# Patient Record
Sex: MALE | Race: WHITE | URBAN - METROPOLITAN AREA
[De-identification: names, ages, dates, MRNs, and addresses within clinical notes are randomized per-mention and may not be internally consistent; named-entity substitution may affect disease eponyms.]

---

## 2021-05-29 ENCOUNTER — RECORDS - HEALTHEAST (OUTPATIENT)
Dept: ADMINISTRATIVE | Facility: CLINIC | Age: 51
End: 2021-05-29

## 2022-12-08 ENCOUNTER — MEDICAL CORRESPONDENCE (OUTPATIENT)
Dept: HEALTH INFORMATION MANAGEMENT | Facility: CLINIC | Age: 52
End: 2022-12-08

## 2022-12-09 ENCOUNTER — TRANSCRIBE ORDERS (OUTPATIENT)
Dept: OTHER | Age: 52
End: 2022-12-09

## 2022-12-09 DIAGNOSIS — R05.9 COUGH, UNSPECIFIED TYPE: Primary | ICD-10-CM

## 2023-01-18 NOTE — TELEPHONE ENCOUNTER
FUTURE VISIT INFORMATION      FUTURE VISIT INFORMATION:    Date: 3/23/23    Time: 1pm    [place] [facility] [place] [facility] [place]FORMATION:    Referring provider:      Referring providers clinic:      Reason for visit/diagnosis  Cough, unspecified type, WC Claim #[id number] - need insurnace name/info, apt per Pt, recs in epic, location verified, ref attached    RECORDS REQUESTED FROM:       Clinic name Comments Records Status Imaging Status   [facility] 12/2/22- CT Chest     Office Visit  11/14/22, 3/14/22- note with Francis Neal MD CE 3/6/23-  Pending req  PACS    [facility]  3/4/22- CT Chest   2/3/22- XR Chest     Office Visit  4/12/22- Note with Agustín Goodman MBBS  3/2/22- note with Keyona Barlow MD  2/25/22- note with Ariela Beckwith PA  7/30/15- note with Aris Mcdaniels PA CE  3/6/23-  Pending req  PACS   [facility] note with Siomara Solorzano MD CE                        March 6, 2023 11:52 AM - Faxed a request to [facility] and [facility] to push Image to [facility] PACS- Alexa  March 13, 2023 at 7:45 AM - [facility] images in PACS, resolved and attached. -Peg   March 17, 2023 at 8:02 AM - Called [facility] for images pushed and send 2nd request -Peg  March 17, 2023 at 3:10 PM - Images from [facility] resolved and attached to patient in PACS -Peg

## 2023-03-22 NOTE — PROGRESS NOTES
[facility]   at the [facility]   Otolaryngology Clinic     Patient: Aris Colon    MRN: [id number]    : 1970    Age/Gender: 52 year old male  Date of Service: 3/23/2023  Rendering Provider:   Kathy De Paz MD     Referring Provider   PCP: No primary care provider on file.  Referring Physician: Provider Not In System     Reason for Consultation   Cough  History   HISTORY OF PRESENT ILLNESS: Mr. Colon is a 52 year old male who presents to us today with cough.      he presents today for evaluation. 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    PAST SURGICAL HISTORY: No past surgical history on file.    CURRENT MEDICATIONS:   Current Outpatient Medications:      albuterol (PROAIR HFA/PROVENTIL HFA/VENTOLIN HFA) 108 (90 Base) MCG/ACT inhaler, 2 puffs every 4 hours as needed, Disp: , Rfl:      FLUoxetine (PROZAC) 20 MG capsule, , Disp: , Rfl:      fluticasone-vilanterol (BREO ELLIPTA) 100-25 MCG/ACT inhaler, , Disp: , Rfl:      hydrOXYzine (ATARAX) 10 MG tablet, , Disp: , Rfl:      sildenafil (REVATIO) 20 MG tablet, TAKE 1-5 TABLETS BY MOUTH 30 MINS TO 4 HOURS BEFORE SEXUAL ACTIVITY, Disp: ,  Rfl:      tiotropium (SPIRIVA RESPIMAT) 2.5 MCG/ACT inhaler, Inhale 2 puffs into the lungs, Disp: , Rfl:     ALLERGIES: Patient has no known allergies.    SOCIAL HISTORY:    Social History     Socioeconomic History     Marital status:      Spouse name: Not on file     Number of children: Not on file     Years of education: Not on file     Highest education level: Not on file   Occupational History     Not on file   Tobacco Use     Smoking status: Never     Smokeless tobacco: Never   Substance and Sexual Activity     Alcohol use: Not on file     Drug use: Not on file     Sexual activity: Not on file   Other Topics Concern     Not on file   Social History Narrative     Not on file     Social Determinants of Health     Financial Resource Strain: Not on file   Food Insecurity: Not on file   Transportation Needs: Not on file   Physical Activity: Not on file   Stress: Not on file   Social Connections: Not on file   Intimate Partner Violence: Not on file   Housing Stability: Not on file         FAMILY HISTORY: No family history on file.  Non-contributory for problems with anesthesia    REVIEW OF SYSTEMS:   The patient was asked a 14 point review of systems regarding constitutional symptoms, eye symptoms, ears, nose, mouth, throat symptoms, cardiovascular symptoms, respiratory symptoms, gastrointestinal symptoms, genitourinary symptoms, musculoskeletal symptoms, integumentary symptoms, neurological symptoms, psychiatric symptoms, endocrine symptoms, hematologic/lymphatic symptoms, and allergic/ immunologic symptoms.   The pertinent factors have been included in the HPI and below.  Patient Supplied Answers to Review of Systems  No flowsheet data found.    Physical Examination   The patient underwent a physical examination as described below. The pertinent positive and negative findings are summarized after the description of the examination.  Constitutional: The patient's developmental and nutritional status was  assessed. The patient's voice quality was assessed.  Head and Face: The head and face were inspected for deformities. The sinuses were palpated. The salivary glands were palpated. Facial muscle strength was assessed bilaterally.  Eyes: Extraocular movements and primary gaze alignment were assessed.  Ears, Nose, Mouth and Throat: The ears and nose were examined for deformities. The nasal septum, mucosa, and turbinates were inspected by anterior rhinoscopy. The lips, teeth, and gums were examined for abnormalities. The oral mucosa, tongue, palate, tonsils, lateral and posterior pharynx were inspected for the presence of asymmetry or mucosal lesions.    Neck: The tracheal position was noted, and the neck mass palpated to determine if there were any asymmetries, abnormal neck masses, thyromegally, or thyroid nodules.  Respiratory: The nature of the breathing and chest expansion/symmetry was observed.  Cardiovascular: The patient was examined to determine the presence of any edema or jugular venous distension.  Abdomen: The contour of the abdomen was noted.  Lymphatic: The patient was examined for infraclavicular lymphadenopathy.  Musculoskeletal: The patient was inspected for the presence of skeletal deformities.  Extremities: The extremities were examined for any clubbing or cyanosis.  Skin: The skin was examined for inflammatory or neoplastic conditions.  Neurologic: The patient's orientation, mood, and affect were noted. The cranial nerve  functions were examined.  Other pertinent positive and negative findings on physical examination:      OC/OP: no lesions, uvula midline, soft palate elevates symmetrically, bilateral mandibular suzanna  Neck: no lesions, no TH tenderness to palpation    All other physical examination findings were within normal limits and noncontributory.  Procedures   Flexible laryngoscopy (CPT 85095)      Pre-procedure diagnosis: chronic cough  Post-procedure diagnosis: same as above  Indication for  procedure: Mr. Colon is a 52 year old male with see above  Procedure(s): Fiberoptic Laryngoscopy    Details of Procedure: After informed consent was obtained, the patient was seated in the examination chair.  The areas of the nasopharynx as well as the hypopharynx were anesthetized with topical 4% lidocaine with 0.25% phenylephrine atomizer.  Examination of the base of tongue was performed first.  Attention was directed to any evidence of masses in the area or evidence of leukoplakia or candidal infection.  Attention was directed to the epiglottis where its size and position was determined and its movement on phonation of the vowel  e .  The piriform sinuses were then inspected for any mass lesions or pooling of secretions.  Attention was then directed to the larynx. The vocal folds were inspected for infection or any areas of leukoplakia, for masses, polypoid degeneration, or hemorrhage.  Having done this, the arytenoids and vocal processes were inspected for erythema or evidence of granuloma formation.  The posterior commissure was then inspected for evidence of inflammatory changes in the mucosa and heaping up of mucosal tissue. The patient was then instructed to say the vowel  e .  Adduction of vocal folds to the midline was observed for any evidence of paresis or paralysis of the larynx or asymmetry in rotation of the larynx to the left or right. The patient was asked to breathe and the degree of abduction was noted bilaterally.  Subglottic view of the larynx was obtained for any additional mass lesions or mucosal changes.  Finally the post cricoid was examined for evidence of pooling of secretions, as well as the pharyngeal wall mucosa.   Anesthesia type: 0.25% phenylephrine    Findings:  Anatomic/physiological deviations: LNC, postcricoid edema   Right vocal process: No restriction of mobility   Left vocal process: No restriction of mobility  Glottal gap: Complete glottal closure  Supraglottic  structures: Normal  Hypopharynx: Normal     Estimated Blood Loss: minimal  Complications: None  Disposition: Patient tolerated the procedure well        Review of Relevant Clinical Data   I personally reviewed:  Notes: Val 11/14/2022  Exposure to dusts in the workplace, with probable RADS due to workplace exposure to dusts and refractory cough:  Based on what data is available, it does not appear likely he was not exposed to any agent that posed an immediate risk to health  He continues to have a severe cough, along with dyspnea. He's been away from all dust exposure for about 5 months now, and the cough is still severe. This suggests his lungs still have a significant degree of irritability, and make continuing the work restriction the best path forward (though it could be a more narrow restriction, as outlined elsewhere). Also, since the symptoms persist, will recheck CT scan now, and perform airway exam (bronchoscopy). Likely to be low-yield but are reasonable to do now.   I did instruct him to try, as much as possible, to suppress the cough. It's likely that his larynx is hypersensitized now, and that each coughing spell further promotes such hypersensitivity and thus more cough. After bronchoscopy, the next steps likely to help him include referral to [facility]. There, it's likely he can learn techniques to suppress the cough.  We still recommend he use the ICS/LABA, and if cost is a limiting factor, I told him to let us know and we can see what alternatives exist (ideally with input from the pharmacy he went to). We restarted benzonatate recently, and will continue it for now.   He saw Dr. Goodman earlier this year, and he had suggested tiotropium -- will resume that now (samples only given -- if he finds helpful, will ERx med). He'd also suggested referral for workup of possible irritable larynx syndrome. That would be the next step if nothing is found on CT/bronch.   For now, best plan is to allow  more time away from the exposures at work and elsewhere and hope that he continues to improve. See work restriction below. We will follow up in April 2023, and update the work restriction again then.   Routine environmental allergy testing was negative earlier. No sign of eosinophilia.   I believe he is still too ill for pulmonary function testing or methacholine challenge - if done now, it would likely be marked by severe cough artifact and thus uninterpretable.   Imaging with chest CT/HRCT did not suggest a pulmonary parenchymal disease process in March 2022. Repeat now, as above.   Other potentially similar entities are not likely: Underlying asthma exacerbated by exposure to dusts, vocal cord dysfunction per se, and eosinophilic bronchitis. Irritable larynx syndrome is possible. Referral to [facility] may help. Will also later consider a trial of pregabalin or gabapentin.  His case meets the definition of RADS (which is similar to irritant induced asthma: this is a form of occupational asthma), and he is already on appropriate treatment, unable to do PFTs, as above.   Once symptoms come under better control, we hope to check PFTs and methacholine challenge, to confirm obstruction and thus RADS as the diagnosis.   I encouraged him to continue to exercise, and begin slowly starting walking again. He's been limited due to the severity of the cough.  Cough - as above, most likely due to RADS. Treatment and removal from exposures as above.   Remote history of minimal tobacco use but no ongoing exposure or vaping. No sign of underlying preexisting respiratory disease. he has had a long history of a high level of exercise tolerance (2 hours routinely in the past).     Radiology: CT chest 12/2/22  1. No acute infiltrate, pleural fluid or adenopathy.   2. A few subpleural nodules in the right middle lobe of the lung measuring up to 3 mm are unchanged as is a 3 mm left lower lobe nodule. These are probably benign.    3. Ascending thoracic aortic aneurysm measuring 46 mm unchanged.     Patent subglottis  Pathology:  Procedures:   Labs:  No results found for: TSH  No results found for: NA, CO2, BUN, CREAT, GLUCOSE, PHOS  No results found for: WBC, HGB, HCT, MCV, PLT  No results found for: PT, PTT, INR  No results found for: MAREN  No components found for: RHEUMATOIDFACTOR,  RF  No results found for: CRP  No components found for: CKTOT, URICACID  No components found for: C3, C4, DSDNAAB, NDNAABIFA  No results found for: MPOAB    Patient reported Quality of Life (QOL) Measures   Patient Supplied Answers To VHI Questionnaire  No flowsheet data found.      Patient Supplied Answers To EAT Questionnaire  No flowsheet data found.      Patient Supplied Answers To CSI Questionnaire  No flowsheet data found.      Patient Supplied Answers to Dyspnea Index Questionnaire:  No flowsheet data found.    Impression & Plan     IMPRESSION: Mr. Colon is a 52 year old male who is being seen for the followin. 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 A few subpleural nodules in the right middle lobe of the lung measuring up to 3 mm are unchanged as is a 3 mm left lower lobe nodule. These are probably benign.  Ascending thoracic aortic aneurysm measuring 46 mm unchanged. Patient subglottis  - no ACEI   - allergy triggers and work up: none  - pulmonary triggers and work up: working with pulm, had chest CT and bronchoscopy, has inhalers and tessalon perles  - GI triggers and work up: wakes up coughing at night, denies feeling reflux, vomits from coughing  - ENT triggers and work up: associated with fragrances, heat, humidity, and  moisture  - scope shows postcricoid edema  - symptoms likely due to irritable larynx syndrome and laryngopharyngeal reflux, does have coughing at night and snoring so need to rule out sleep apnea  Plan  - cough/throat clearing suppression therapy  - start omeprazole 40 mg BID  - start tessalon perles TID prn  - Xray Video Swallow Exam with esophagram  - sleep study  - referral to health psychology      RETURN VISIT: end of May    Thank you for the kind referral and for allowing me to share in the care of Mr. Colon. If you have any questions, please do not hesitate to contact me.    Scribe Disclosure:  I, Brenden Alas, am serving as a scribe to document services personally performed by Kathy De Paz MD based on data collection and the provider's statements to me.     Kathy De Paz MD    Laryngology    [facility]  Department of  Otolaryngology - Head and Neck Surgery  Clinics & Surgery Center  [address] [place] [id number]  Appointment line: [phone]  Fax: [phone]  [email or web address]

## 2023-03-23 ENCOUNTER — PRE VISIT (OUTPATIENT)
Dept: OTOLARYNGOLOGY | Facility: CLINIC | Age: 53
End: 2023-03-23

## 2023-03-23 ENCOUNTER — OFFICE VISIT (OUTPATIENT)
Dept: OTOLARYNGOLOGY | Facility: CLINIC | Age: 53
End: 2023-03-23
Payer: OTHER MISCELLANEOUS

## 2023-03-23 ENCOUNTER — THERAPY VISIT (OUTPATIENT)
Dept: SPEECH THERAPY | Facility: CLINIC | Age: 53
End: 2023-03-23
Payer: MEDICAID

## 2023-03-23 VITALS
WEIGHT: 198 LBS | HEART RATE: 77 BPM | DIASTOLIC BLOOD PRESSURE: 85 MMHG | BODY MASS INDEX: 28.35 KG/M2 | OXYGEN SATURATION: 98 % | SYSTOLIC BLOOD PRESSURE: 132 MMHG | HEIGHT: 70 IN

## 2023-03-23 DIAGNOSIS — R49.0 DYSPHONIA: ICD-10-CM

## 2023-03-23 DIAGNOSIS — J38.7 IRRITABLE LARYNX SYNDROME: ICD-10-CM

## 2023-03-23 DIAGNOSIS — J38.7 LARYNGEAL HYPERFUNCTION: ICD-10-CM

## 2023-03-23 DIAGNOSIS — R05.9 COUGH, UNSPECIFIED TYPE: ICD-10-CM

## 2023-03-23 DIAGNOSIS — R05.3 CHRONIC COUGH: Primary | ICD-10-CM

## 2023-03-23 DIAGNOSIS — R05.9 COUGH, UNSPECIFIED TYPE: Primary | ICD-10-CM

## 2023-03-23 PROCEDURE — 31575 DIAGNOSTIC LARYNGOSCOPY: CPT | Performed by: OTOLARYNGOLOGY

## 2023-03-23 PROCEDURE — 92524 BEHAVRAL QUALIT ANALYS VOICE: CPT | Mod: GN | Performed by: SPEECH-LANGUAGE PATHOLOGIST

## 2023-03-23 PROCEDURE — 99205 OFFICE O/P NEW HI 60 MIN: CPT | Mod: 25 | Performed by: OTOLARYNGOLOGY

## 2023-03-23 PROCEDURE — 99207 PR NO BILLABLE SERVICE THIS VISIT: CPT | Performed by: SPEECH-LANGUAGE PATHOLOGIST

## 2023-03-23 RX ORDER — OMEPRAZOLE 40 MG/1
40 CAPSULE, DELAYED RELEASE ORAL 2 TIMES DAILY
Qty: 60 CAPSULE | Refills: 0 | Status: SHIPPED | OUTPATIENT
Start: 2023-03-23 | End: 2023-04-22

## 2023-03-23 RX ORDER — SILDENAFIL CITRATE 20 MG/1
TABLET ORAL
COMMUNITY
Start: 2023-03-15

## 2023-03-23 RX ORDER — HYDROXYZINE HYDROCHLORIDE 10 MG/1
TABLET, FILM COATED ORAL
COMMUNITY

## 2023-03-23 RX ORDER — ALBUTEROL SULFATE 90 UG/1
2 AEROSOL, METERED RESPIRATORY (INHALATION) EVERY 4 HOURS PRN
COMMUNITY
Start: 2022-05-24

## 2023-03-23 RX ORDER — FLUTICASONE FUROATE AND VILANTEROL 100; 25 UG/1; UG/1
POWDER RESPIRATORY (INHALATION)
COMMUNITY
Start: 2022-08-02

## 2023-03-23 RX ORDER — BENZONATATE 100 MG/1
100 CAPSULE ORAL 3 TIMES DAILY PRN
Qty: 30 CAPSULE | Refills: 4 | Status: SHIPPED | OUTPATIENT
Start: 2023-03-23

## 2023-03-23 ASSESSMENT — PAIN SCALES - GENERAL: PAINLEVEL: NO PAIN (0)

## 2023-03-23 NOTE — LETTER
Date:March 24, 2023      Patient was self referred, no letter generated. Do not send.        [facility] Health Information

## 2023-03-23 NOTE — PROGRESS NOTES
Aris Colon was seen for allied health care provider visit in conjunction with Dr. De Paz's ENT Multi-Disciplinary clinic. Introduced self and SLP roll in evaluation process. 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 He had increased breathing troubles and coughing due to exposure to the sugar dust. He has been working with various specialists to mitigate the cough and was ultimately referred to Dr. De Paz for evaluation. Currently pt reports some coughing with swallowing although he can cough at any time. Speaking is clear. Swallowing is functional per his report. He has stopped eating pizza, salami, and spicy foods because they will trigger a cough response. All questions answered within scope of practice for pt. Encouraged pt to reach out with any questions or concerns. Time spent with patient 10 minutes. Pt unable to tolerate scope exam without topical anesthetic so swallow evaluation deferred. Video swallow study recommended at a later date. No billable services provided during today's encounter.       Jerrica Brown MS, CCC-SLP  Speech-Language Pathology  [facility]  Department of Otolaryngology/D&T - 4th floor  Phone: [phone]  Email: [email or web address]
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
## 2023-03-23 NOTE — PATIENT INSTRUCTIONS
1.  You were seen in the ENT Clinic today by Dr. De Paz. If you have any questions or concerns after your appointment, please call [phone]. Press option #1 for scheduling related needs. Press option #3 for Nurse advice.    2.  Dr. De Paz has recommended the following:   - sleep study   - omperazole 40mg twice a day   -swallow study   - voice therapy   - teslon pearls as needed  - if reflux persists for over a month with medications reach out to our team     3.  Plan is to return to clinic mid to end of may      Fatemeh He  [phone]  [facility] - Otolaryngology

## 2023-03-23 NOTE — LETTER
3/23/2023       RE: Aris Colon  [address]  [address] [place] [id number]     Dear Colleague,    Thank you for referring your patient, Aris Colon, to the [facility] EAR [facility] AND THROAT CLINIC [place] at [facility]. Please see a copy of my visit note below.        [facility]   at the [facility]   Otolaryngology Clinic     Patient: Aris Colon    MRN: [id number]    : 1970    Age/Gender: 52 year old male  Date of Service: 3/23/2023  Rendering Provider:   Kathy De Paz MD     Referring Provider   PCP: No primary care provider on file.  Referring Physician: Provider Not In System     Reason for Consultation   Cough  History   HISTORY OF PRESENT ILLNESS: Mr. Colon is a 52 year old male who presents to us today with cough.      he presents today for evaluation. 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    PAST SURGICAL HISTORY: No past surgical history on file.    CURRENT MEDICATIONS:   Current Outpatient Medications:      albuterol (PROAIR HFA/PROVENTIL HFA/VENTOLIN HFA) 108 (90 Base) MCG/ACT inhaler, 2  puffs every 4 hours as needed, Disp: , Rfl:      FLUoxetine (PROZAC) 20 MG capsule, , Disp: , Rfl:      fluticasone-vilanterol (BREO ELLIPTA) 100-25 MCG/ACT inhaler, , Disp: , Rfl:      hydrOXYzine (ATARAX) 10 MG tablet, , Disp: , Rfl:      sildenafil (REVATIO) 20 MG tablet, TAKE 1-5 TABLETS BY MOUTH 30 MINS TO 4 HOURS BEFORE SEXUAL ACTIVITY, Disp: , Rfl:      tiotropium (SPIRIVA RESPIMAT) 2.5 MCG/ACT inhaler, Inhale 2 puffs into the lungs, Disp: , Rfl:     ALLERGIES: Patient has no known allergies.    SOCIAL HISTORY:    Social History     Socioeconomic History     Marital status:      Spouse name: Not on file     Number of children: Not on file     Years of education: Not on file     Highest education level: Not on file   Occupational History     Not on file   Tobacco Use     Smoking status: Never     Smokeless tobacco: Never   Substance and Sexual Activity     Alcohol use: Not on file     Drug use: Not on file     Sexual activity: Not on file   Other Topics Concern     Not on file   Social History Narrative     Not on file     Social Determinants of Health     Financial Resource Strain: Not on file   Food Insecurity: Not on file   Transportation Needs: Not on file   Physical Activity: Not on file   Stress: Not on file   Social Connections: Not on file   Intimate Partner Violence: Not on file   Housing Stability: Not on file         FAMILY HISTORY: No family history on file.  Non-contributory for problems with anesthesia    REVIEW OF SYSTEMS:   The patient was asked a 14 point review of systems regarding constitutional symptoms, eye symptoms, ears, nose, mouth, throat symptoms, cardiovascular symptoms, respiratory symptoms, gastrointestinal symptoms, genitourinary symptoms, musculoskeletal symptoms, integumentary symptoms, neurological symptoms, psychiatric symptoms, endocrine symptoms, hematologic/lymphatic symptoms, and allergic/ immunologic symptoms.   The pertinent factors have been included in the HPI  and below.  Patient Supplied Answers to Review of Systems  No flowsheet data found.    Physical Examination   The patient underwent a physical examination as described below. The pertinent positive and negative findings are summarized after the description of the examination.  Constitutional: The patient's developmental and nutritional status was assessed. The patient's voice quality was assessed.  Head and Face: The head and face were inspected for deformities. The sinuses were palpated. The salivary glands were palpated. Facial muscle strength was assessed bilaterally.  Eyes: Extraocular movements and primary gaze alignment were assessed.  Ears, Nose, Mouth and Throat: The ears and nose were examined for deformities. The nasal septum, mucosa, and turbinates were inspected by anterior rhinoscopy. The lips, teeth, and gums were examined for abnormalities. The oral mucosa, tongue, palate, tonsils, lateral and posterior pharynx were inspected for the presence of asymmetry or mucosal lesions.    Neck: The tracheal position was noted, and the neck mass palpated to determine if there were any asymmetries, abnormal neck masses, thyromegally, or thyroid nodules.  Respiratory: The nature of the breathing and chest expansion/symmetry was observed.  Cardiovascular: The patient was examined to determine the presence of any edema or jugular venous distension.  Abdomen: The contour of the abdomen was noted.  Lymphatic: The patient was examined for infraclavicular lymphadenopathy.  Musculoskeletal: The patient was inspected for the presence of skeletal deformities.  Extremities: The extremities were examined for any clubbing or cyanosis.  Skin: The skin was examined for inflammatory or neoplastic conditions.  Neurologic: The patient's orientation, mood, and affect were noted. The cranial nerve  functions were examined.  Other pertinent positive and negative findings on physical examination:      OC/OP: no lesions, uvula midline,  soft palate elevates symmetrically, bilateral mandibular suzanna  Neck: no lesions, no TH tenderness to palpation    All other physical examination findings were within normal limits and noncontributory.  Procedures   Flexible laryngoscopy (CPT 81902)      Pre-procedure diagnosis: chronic cough  Post-procedure diagnosis: same as above  Indication for procedure: Mr. Colon is a 52 year old male with see above  Procedure(s): Fiberoptic Laryngoscopy    Details of Procedure: After informed consent was obtained, the patient was seated in the examination chair.  The areas of the nasopharynx as well as the hypopharynx were anesthetized with topical 4% lidocaine with 0.25% phenylephrine atomizer.  Examination of the base of tongue was performed first.  Attention was directed to any evidence of masses in the area or evidence of leukoplakia or candidal infection.  Attention was directed to the epiglottis where its size and position was determined and its movement on phonation of the vowel  e .  The piriform sinuses were then inspected for any mass lesions or pooling of secretions.  Attention was then directed to the larynx. The vocal folds were inspected for infection or any areas of leukoplakia, for masses, polypoid degeneration, or hemorrhage.  Having done this, the arytenoids and vocal processes were inspected for erythema or evidence of granuloma formation.  The posterior commissure was then inspected for evidence of inflammatory changes in the mucosa and heaping up of mucosal tissue. The patient was then instructed to say the vowel  e .  Adduction of vocal folds to the midline was observed for any evidence of paresis or paralysis of the larynx or asymmetry in rotation of the larynx to the left or right. The patient was asked to breathe and the degree of abduction was noted bilaterally.  Subglottic view of the larynx was obtained for any additional mass lesions or mucosal changes.  Finally the post cricoid was examined  for evidence of pooling of secretions, as well as the pharyngeal wall mucosa.   Anesthesia type: 0.25% phenylephrine    Findings:  Anatomic/physiological deviations: LNC, postcricoid edema   Right vocal process: No restriction of mobility   Left vocal process: No restriction of mobility  Glottal gap: Complete glottal closure  Supraglottic structures: Normal  Hypopharynx: Normal     Estimated Blood Loss: minimal  Complications: None  Disposition: Patient tolerated the procedure well        Review of Relevant Clinical Data   I personally reviewed:  Notes: Val 11/14/2022  Exposure to dusts in the workplace, with probable RADS due to workplace exposure to dusts and refractory cough:  Based on what data is available, it does not appear likely he was not exposed to any agent that posed an immediate risk to health  He continues to have a severe cough, along with dyspnea. He's been away from all dust exposure for about 5 months now, and the cough is still severe. This suggests his lungs still have a significant degree of irritability, and make continuing the work restriction the best path forward (though it could be a more narrow restriction, as outlined elsewhere). Also, since the symptoms persist, will recheck CT scan now, and perform airway exam (bronchoscopy). Likely to be low-yield but are reasonable to do now.   I did instruct him to try, as much as possible, to suppress the cough. It's likely that his larynx is hypersensitized now, and that each coughing spell further promotes such hypersensitivity and thus more cough. After bronchoscopy, the next steps likely to help him include referral to [facility]. There, it's likely he can learn techniques to suppress the cough.  We still recommend he use the ICS/LABA, and if cost is a limiting factor, I told him to let us know and we can see what alternatives exist (ideally with input from the pharmacy he went to). We restarted benzonatate recently, and will continue  it for now.   He saw Dr. Goodman earlier this year, and he had suggested tiotropium -- will resume that now (samples only given -- if he finds helpful, will ERx med). He'd also suggested referral for workup of possible irritable larynx syndrome. That would be the next step if nothing is found on CT/bronch.   For now, best plan is to allow more time away from the exposures at work and elsewhere and hope that he continues to improve. See work restriction below. We will follow up in April 2023, and update the work restriction again then.   Routine environmental allergy testing was negative earlier. No sign of eosinophilia.   I believe he is still too ill for pulmonary function testing or methacholine challenge - if done now, it would likely be marked by severe cough artifact and thus uninterpretable.   Imaging with chest CT/HRCT did not suggest a pulmonary parenchymal disease process in March 2022. Repeat now, as above.   Other potentially similar entities are not likely: Underlying asthma exacerbated by exposure to dusts, vocal cord dysfunction per se, and eosinophilic bronchitis. Irritable larynx syndrome is possible. Referral to [facility] may help. Will also later consider a trial of pregabalin or gabapentin.  His case meets the definition of RADS (which is similar to irritant induced asthma: this is a form of occupational asthma), and he is already on appropriate treatment, unable to do PFTs, as above.   Once symptoms come under better control, we hope to check PFTs and methacholine challenge, to confirm obstruction and thus RADS as the diagnosis.   I encouraged him to continue to exercise, and begin slowly starting walking again. He's been limited due to the severity of the cough.  Cough - as above, most likely due to RADS. Treatment and removal from exposures as above.   Remote history of minimal tobacco use but no ongoing exposure or vaping. No sign of underlying preexisting respiratory disease. he has  had a long history of a high level of exercise tolerance (2 hours routinely in the past).     Radiology: CT chest 22  1. No acute infiltrate, pleural fluid or adenopathy.   2. A few subpleural nodules in the right middle lobe of the lung measuring up to 3 mm are unchanged as is a 3 mm left lower lobe nodule. These are probably benign.   3. Ascending thoracic aortic aneurysm measuring 46 mm unchanged.     Patent subglottis  Pathology:  Procedures:   Labs:  No results found for: TSH  No results found for: NA, CO2, BUN, CREAT, GLUCOSE, PHOS  No results found for: WBC, HGB, HCT, MCV, PLT  No results found for: PT, PTT, INR  No results found for: MAREN  No components found for: RHEUMATOIDFACTOR,  RF  No results found for: CRP  No components found for: CKTOT, URICACID  No components found for: C3, C4, DSDNAAB, NDNAABIFA  No results found for: MPOAB    Patient reported Quality of Life (QOL) Measures   Patient Supplied Answers To VHI Questionnaire  No flowsheet data found.      Patient Supplied Answers To EAT Questionnaire  No flowsheet data found.      Patient Supplied Answers To CSI Questionnaire  No flowsheet data found.      Patient Supplied Answers to Dyspnea Index Questionnaire:  No flowsheet data found.    Impression & Plan     IMPRESSION: Mr. Colon is a 52 year old male who is being seen for the followin. 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 A few subpleural nodules in the right middle lobe of the lung measuring up to 3 mm are unchanged as is a 3 mm left lower lobe nodule. These are probably benign.  Ascending thoracic aortic aneurysm measuring 46 mm unchanged. Patient subglottis  - no  ACEI   - allergy triggers and work up: none  - pulmonary triggers and work up: working with pulm, had chest CT and bronchoscopy, has inhalers and tessalon perles  - GI triggers and work up: wakes up coughing at night, denies feeling reflux, vomits from coughing  - ENT triggers and work up: associated with fragrances, heat, humidity, and moisture  - scope shows postcricoid edema  - symptoms likely due to irritable larynx syndrome and laryngopharyngeal reflux, does have coughing at night and snoring so need to rule out sleep apnea  Plan  - cough/throat clearing suppression therapy  - start omeprazole 40 mg BID  - start tessalon perles TID prn  - Xray Video Swallow Exam with esophagram  - sleep study  - referral to health psychology      RETURN VISIT: end of May    Thank you for the kind referral and for allowing me to share in the care of Mr. Colon. If you have any questions, please do not hesitate to contact me.    Scribe Disclosure:  I, Brenden Alas, am serving as a scribe to document services personally performed by Kathy De Paz MD based on data collection and the provider's statements to me.     Kathy De Paz MD    Laryngology    [facility]  Department of  Otolaryngology - Head and Neck Surgery  Clinics [place] [address]  Appointment line: [phone]  Fax: [phone]  [email or web address]        Again, thank you for allowing me to participate in the care of your patient.      Sincerely,    Kathy De Paz MD

## 2023-04-01 ENCOUNTER — TELEPHONE (OUTPATIENT)
Dept: OTOLARYNGOLOGY | Facility: CLINIC | Age: 53
End: 2023-04-01

## 2023-04-06 DIAGNOSIS — R05.3 CHRONIC COUGH: Primary | ICD-10-CM

## 2023-04-06 RX ORDER — PANTOPRAZOLE SODIUM 40 MG/1
40 TABLET, DELAYED RELEASE ORAL 2 TIMES DAILY
Qty: 60 TABLET | Refills: 0 | Status: SHIPPED | OUTPATIENT
Start: 2023-04-06 | End: 2023-06-19

## 2023-04-10 ENCOUNTER — PATIENT OUTREACH (OUTPATIENT)
Dept: OTOLARYNGOLOGY | Facility: CLINIC | Age: 53
End: 2023-04-10
Payer: COMMERCIAL

## 2023-04-10 NOTE — PROGRESS NOTES
Called patient to offer an earlier appointment, no answer or voicemail to leave a message. Will follow up on Mychart. Fatemeh He RN on 4/10/2023 at 9:29 AM

## 2023-04-18 ENCOUNTER — TELEPHONE (OUTPATIENT)
Dept: OTOLARYNGOLOGY | Facility: CLINIC | Age: 53
End: 2023-04-18
Payer: COMMERCIAL

## 2023-05-04 ENCOUNTER — TELEPHONE (OUTPATIENT)
Dept: OTOLARYNGOLOGY | Facility: CLINIC | Age: 53
End: 2023-05-04
Payer: COMMERCIAL

## 2023-05-04 NOTE — TELEPHONE ENCOUNTER
Left vm message for patient in regards to rescheduling upcoming appt due to provider unavailable. Writers call back number provided on vm

## 2023-05-14 ENCOUNTER — HEALTH MAINTENANCE LETTER (OUTPATIENT)
Age: 53
End: 2023-05-14

## 2023-06-15 ENCOUNTER — DOCUMENTATION ONLY (OUTPATIENT)
Dept: OTOLARYNGOLOGY | Facility: CLINIC | Age: 53
End: 2023-06-15
Payer: COMMERCIAL

## 2023-06-16 DIAGNOSIS — R05.3 CHRONIC COUGH: ICD-10-CM

## 2023-06-19 RX ORDER — PANTOPRAZOLE SODIUM 40 MG/1
40 TABLET, DELAYED RELEASE ORAL 2 TIMES DAILY
Qty: 180 TABLET | Refills: 2 | Status: SHIPPED | OUTPATIENT
Start: 2023-06-19

## 2023-06-19 NOTE — TELEPHONE ENCOUNTER
Last Clinic Visit: 3/23/2023 [facility] [place]

## 2023-06-20 ENCOUNTER — DOCUMENTATION ONLY (OUTPATIENT)
Dept: OTOLARYNGOLOGY | Facility: CLINIC | Age: 53
End: 2023-06-20
Payer: COMMERCIAL

## 2023-08-18 ENCOUNTER — TRANSFERRED RECORDS (OUTPATIENT)
Dept: HEALTH INFORMATION MANAGEMENT | Facility: CLINIC | Age: 53
End: 2023-08-18
Payer: COMMERCIAL

## 2023-08-28 ENCOUNTER — TELEPHONE (OUTPATIENT)
Dept: BEHAVIORAL HEALTH | Facility: CLINIC | Age: 53
End: 2023-08-28

## 2023-08-28 ENCOUNTER — OFFICE VISIT (OUTPATIENT)
Dept: SLEEP MEDICINE | Facility: CLINIC | Age: 53
End: 2023-08-28
Attending: OTOLARYNGOLOGY
Payer: OTHER MISCELLANEOUS

## 2023-08-28 VITALS
BODY MASS INDEX: 29.44 KG/M2 | DIASTOLIC BLOOD PRESSURE: 73 MMHG | SYSTOLIC BLOOD PRESSURE: 111 MMHG | WEIGHT: 205.2 LBS | HEART RATE: 90 BPM | OXYGEN SATURATION: 97 %

## 2023-08-28 DIAGNOSIS — F51.04 CHRONIC INSOMNIA: Primary | ICD-10-CM

## 2023-08-28 DIAGNOSIS — R05.3 CHRONIC COUGH: ICD-10-CM

## 2023-08-28 DIAGNOSIS — F32.A ANXIETY AND DEPRESSION: ICD-10-CM

## 2023-08-28 DIAGNOSIS — R06.83 SNORING: ICD-10-CM

## 2023-08-28 DIAGNOSIS — G47.21 CIRCADIAN RHYTHM SLEEP DISORDER, DELAYED SLEEP PHASE TYPE: ICD-10-CM

## 2023-08-28 DIAGNOSIS — Z72.821 INADEQUATE SLEEP HYGIENE: ICD-10-CM

## 2023-08-28 DIAGNOSIS — F41.9 ANXIETY AND DEPRESSION: ICD-10-CM

## 2023-08-28 DIAGNOSIS — R53.82 CHRONIC FATIGUE: ICD-10-CM

## 2023-08-28 DIAGNOSIS — G47.9 SLEEP DISTURBANCE: ICD-10-CM

## 2023-08-28 PROCEDURE — 99205 OFFICE O/P NEW HI 60 MIN: CPT | Performed by: INTERNAL MEDICINE

## 2023-08-28 NOTE — NURSING NOTE
"Chief Complaint   Patient presents with    Sleep Problem     \"I cannot sleep/difficulty staying asleep\"       Initial /73   Pulse 90   Wt 93.1 kg (205 lb 3.2 oz)   SpO2 97%   BMI 29.44 kg/m   Estimated body mass index is 29.44 kg/m  as calculated from the following:    Height as of 3/23/23: 1.778 m (5' 10\").    Weight as of this encounter: 93.1 kg (205 lb 3.2 oz).    Medication Reconciliation: complete    MARÍA ELENA 14    Moi Bond CMA (AAMA)  "

## 2023-08-28 NOTE — TELEPHONE ENCOUNTER
8/28/23: Pt is a(n) adult (18+ out of HS) Seeking as eval for Adult Mental Health DA for evaluation and recommendations..  Appointment scheduled by:  Other  (no cost estimate)  Caller name:  Paulette RODRIGUES    Caller phone #: [phone]  Legal Guardianship Reviewed?  No  Honoring Choices Notified?  No  Brief reason for appt:  MH Eval      needed?  NO    Contact information verified/updated: No, not speaking with the pt.     Blanca Christian

## 2023-08-28 NOTE — PATIENT INSTRUCTIONS
Insomnia and Behavioral Sleep Medicine Program    The [facility] Insomnia and Behavioral Sleep Medicine Program provides non-drug treatment for sleep problems including:    Cognitive-behavioral Therapies for Insomnia (CBT-I)  Management of Shift-work and Jet Lag  Management of Delayed, Advanced and Irregular Circadian Rhythm Sleep Disorders  Imagery Rehearsal Therapy (IRT) for Nightmare Disorder  PAP Therapy Desensitization    You have been referred for consultation with a sleep psychologist who specializes in behavioral sleep medicine and treatment of insomnia.  The [facility] Insomnia and Behavioral Sleep Medicine Program offers individualized telehealth services through our [facility] and online CBT-I.    Preparing for your Consultation    You will need to keep a Sleep Diary for at least a week prior to your visit. Complete the sleep diary each day first thing after you get up by answering a few key questions about your sleep using our convenient mobile noemi or paper sleep diary.  Your answers should be based on your recall of the past 24 hours.  Avoid watching the clock or recording data during the night.     Insomnia  Noemi    The Insomnia  mobile noemi  is a convenient way to keep track of your sleep prior to your sleep consultation.  Simply download the free noemi on your Apple or Android phone and record your information each morning.  The noemi includes training, self-assessment, and sleep schedule recommendations.  Prior to your consultation we recommend you use only the sleep diary function. You can e-mail yourself a copy of your sleep diary data by going to the Settings section and using the [place] User Data function.  During your consultation your provider will review the data with you.          [facility] Sleep Diary    You can also track your sleep using the [facility] paper sleep diary.  You can upload your sleep diary and send it via a [employer]  message, fax it to [phone], or have it with you at the time of your consultation.            CBT-I:  Frequently Asked Questions    What is CBT-I?    Cognitive Behavioral Therapy for Insomnia, also known as CBT-I, is a highly effective non-drug treatment for insomnia. The American College of Physicians recommends CBT-I as the first treatment for chronic insomnia.  Research has shown CBT-I to be safer and more effective long term than sleeping pills.    What does CBT-I involve?     CBT-I targets behaviors that lead to chronic insomnia:  Habits that weaken the bed as a cue for sleep  Habits that weaken your body's sleep drive and sleep/wake clock   Unhelpful sleep thoughts that increase sleep-related worry and arousal.    The process involves 3-6 telehealth visits that guide you to implement proven strategies to get a better night's sleep.    People often see improvement in their sleep within a few weeks. Research shows if you keep practicing the skills you learn your sleep is likely to continue to improve 6-12 months after treatment.    Does this program prescribe or manage sleep medication?    No.  Your prescribing provider is responsible to assist you in managing your sleep medications.  Some people choose to stop using sleep medication prior to or during CBT-I.  Our program can work with your prescribing provider to help reduce or eliminate use of sleep medications.     Getting Started Today!    If you haven't already done so, we recommend you consider making the following changes to your sleep habits prior to your sleep consultation:     Reduce your consumption of caffeine and alcohol.  Both can disrupt sleep and make strengthening your sleep more difficult.  Specifically:    - Avoid caffeine within 6 hours of bedtime   - No more than 3 caffeinated beverages per day (e.g. 8 oz. cup coffee or 12 oz. cup soda)            - No alcohol within 3 hours of bedtime    Make sure your bedroom is quiet, comfortable and  dark.  Noise, light and an uncomfortable sleep space can harm your sleep.      Keep the same sleep schedule 7 days a week.unless you do shift work.      Online CBT-I     If you want to get started today, research indicates that online CBT-I can be effective for some individuals. These programs requires comfort with deanna-based or online learning.  However, digital CBT-I programs are not for everyone.  Contraindications include:    Seizure disorders,   Bipolar disorder,   Unstable medical or mental health conditions,   Frailty or risk of falling  Pregnancy    You should consult a sleep specialist before using these resources if you have:    Sleep Apnea  Restless Leg Syndrome  Sleep Walking  REM behavior disorder  Night Terrors  Excessive Daytime Sleepiness  Are engaged in shift work  Use prescription sleep medication    Our Online CBT-I program    If your sleep provider recommends online CBT-I for you , the cost for an entire 6-week program is $40.    To get started, copy and paste the link below which will take you to the landing page to register:                           [email or web address]               If you wish to complete the online CBT-I program but do not plan to follow-up with a sleep provider, you are set to begin the program.    If you are planning to work with an [facility] sleep provider, there are a couple of extra steps you can take to share your sleep data with your sleep provider.  To share sleep log data, go to the left side navigation and click on the  share sleep log  button:         You will be taken to the page below where you will enter  the provider code [email or web address] into the box.          Once you press the locate button, the information for [facility] will pop up as below.  By pressing the Submit button your data will be sent to our  secure [facility] sleep program portal and is available for review by your provider. You will only need to do this step once.                                   Self-help Workbooks for Insomnia    If you have found self-help books useful in the past, you may want to consider reading one of the following books prior to your consultation:    Say Alfredo to Insomnia: The Six-Week, Drug-Free Program Developed at [facility] [employer].  Khanh Awad MD. Available in paperback, Jay, and audiobook.    Overcoming Insomnia: A Cognitive-Behavioral Therapy Approach, Workbook.  Ketan Camarena, PhD  and Ritika Montejo, PhD.  Available in paperback and Jay.    Quiet Your Mind and Get to Sleep: Solutions to Insomnia for Those with Depression, Anxiety, or Chronic Pain.  Nadja Amato, PhD and Ritika Montejo, PhD.  Available in paperback and Jay      Recommended following a regular sleep-wake pattern every day, arrived at a goal bedtime of 2 AM and a goal wake time of 10AM every day of the week. We discussed minimizing exposure to bright lights at least a couple hours before the goal bedtime.  Please avoid mind stimulating activities/screen time or use of electronics at least an hour before bed. Recommended one-half to one tab of over-the-counter melatonin 1 mg to be taken by mouth at 12 midnight.  Please DO NOT USE zzquil since you do not want to combine with melatonin.  Recommend  using a  bright light box of 10,000 Lux intensity with exposure to bright light for 30 to 60 minutes soon after awakening at 10AM.   Reduce caffeinated beverages to no more than 2 /day and Avoid caffeine within 6 hours before bed.  Avoid naps during the day  Please continue follow up with your primary care provider to optimize management of anxiety and depression.            MY TREATMENT INFORMATION FOR SLEEP APNEA-  Aris Colon    DOCTOR : Alfredito Crawford MD    Am I having a sleep study at a sleep center?  --->Due to normal delays, you will be contacted within 2-4 weeks to schedule  Frequently asked questions:  1. What is Obstructive Sleep Apnea  "(TONO)? TONO is the most common type of sleep apnea. Apnea means, \"without breath.\"  Apnea is most often caused by narrowing or collapse of the upper airway as muscles relax during sleep.   Almost everyone has occasional apneas. Most people with sleep apnea have had brief interruptions at night frequently for many years.  The severity of sleep apnea is related to how frequent and severe the events are.   2. What are the consequences of TONO? Symptoms include: feeling sleepy during the day, snoring loudly, gasping or stopping of breathing, trouble sleeping, and occasionally morning headaches or heartburn at night.  Sleepiness can be serious and even increase the risk of falling asleep while driving. Other health consequences may include development of high blood pressure and other cardiovascular disease in persons who are susceptible. Untreated TONO  can contribute to heart disease, stroke and diabetes.   3. What are the treatment options? In most situations, sleep apnea is a lifelong disease that must be managed with daily therapy. Medications are not effective for sleep apnea and surgery is generally not considered until other therapies have been tried. Your treatment is your choice . Continuous Positive Airway (CPAP) works right away and is the therapy that is effective in nearly everyone. An oral device to hold your jaw forward is usually the next most reliable option. Other options include postioning devices (to keep you off your back), weight loss, and surgery including a tongue pacing device. There is more detail about some of these options below.  4. Are my sleep studies covered by insurance? Although we will request verification of coverage, we advise you also check in advance of the study to ensure there is coverage.    Important tips for those choosing CPAP and similar devices  For new devices, sign up for device DIONNE to monitor your device for your followup visits  We encourage you to utilize the myAir by " [employer] deanna or website (myAir web (resmed.com) ) to monitor your therapy progress and share the data with your healthcare team when you discuss your sleep apnea.                                                    Know your equipment:  CPAP is continuous positive airway pressure that prevents obstructive sleep apnea by keeping the throat from collapsing while you are sleeping. In most cases, the device is  smart  and can slowly self-adjusts if your throat collapses and keeps a record every day of how well you are treated-this information is available to you and your care team.  BPAP is bilevel positive airway pressure that keeps your throat open and also assists each breath with a pressure boost to maintain adequate breathing.  Special kinds of BPAP are used in patients who have inadequate breathing from lung or heart disease. In most cases, the device is  smart  and can slowly self-adjusts to assist breathing. Like CPAP, the device keeps a record of how well you are treated.  Your mask is your connection to the device. You get to choose what feels most comfortable and the staff will help to make sure if fits. Here: are some examples of the different masks that are available:       Key points to remember on your journey with sleep apnea:  Sleep study.  PAP devices often need to be adjusted during a sleep study to show that they are effective and adjusted right.  Good tips to remember: Try wearing just the mask during a quiet time during the day so your body adapts to wearing it. A humidifier is recommended for comfort in most cases to prevent drying of your nose and throat. Allergy medication from your provider may help you if you are having nasal congestion.  Getting settled-in. It takes more than one night for most of us to get used to wearing a mask. Try wearing just the mask during a quiet time during the day so your body adapts to wearing it. A humidifier is recommended for comfort in most cases. Our team will  work with you carefully on the first day and will be in contact within 4 days and again at 2 and 4 weeks for advice and remote device adjustments. Your therapy is evaluated by the device each day.   Use it every night. The more you are able to sleep naturally for 7-8 hours, the more likely you will have good sleep and to prevent health risks or symptoms from sleep apnea. Even if you use it 4 hours it helps. Occasionally all of us are unable to use a medical therapy, in sleep apnea, it is not dangerous to miss one night.   Communicate. Call our skilled team on the number provided on the first day if your visit for problems that make it difficult to wear the device. Over 2 out of 3 patients can learn to wear the device long-term with help from our team. Remember to call our team or your sleep providers if you are unable to wear the device as we may have other solutions for those who cannot adapt to mask CPAP therapy. It is recommended that you sleep your sleep provider within the first 3 months and yearly after that if you are not having problems.   Use it for your health. We encourage use of CPAP masks during daytime quiet periods to allow your face and brain to adapt to the sensation of CPAP so that it will be a more natural sensation to awaken to at night or during naps. This can be very useful during the first few weeks or months of adapting to CPAP though it does not help medically to wear CPAP during wakefulness and  should not be used as a strategy just to meet guidelines.  Take care of your equipment. Make sure you clean your mask and tubing using directions every day and that your filter and mask are replaced as recommended or if they are not working.     BESIDES CPAP, WHAT OTHER THERAPIES ARE THERE?    Positioning Device  Positioning devices are generally used when sleep apnea is mild and only occurs on your back.This example shows a pillow that straps around the waist. It may be appropriate for those whose  sleep study shows milder sleep apnea that occurs primarily when lying flat on one's back. Preliminary studies have shown benefit but effectiveness at home may need to be verified by a home sleep test. These devices are generally not covered by medical insurance.  Examples of devices that maintain sleeping on the back to prevent snoring and mild sleep apnea.    Belt type body positioner  http://TabbedOut.[place]/    Electronic reminder  http://nightshifttherapy.com/            Oral Appliance  What is oral appliance therapy?  An oral appliance device fits on your teeth at night like a retainer used after having braces. The device is made by a specialized dentist and requires several visits over 1-2 months before a manufactured device is made to fit your teeth and is adjusted to prevent your sleep apnea. Once an oral device is working properly, snoring should be improved. A home sleep test may be recommended at that time if to determine whether the sleep apnea is adequately treated.       Some things to remember:  -Oral devices are often, but not always, covered by your medical insurance. Be sure to check with your insurance provider.   -If you are referred for oral therapy, you will be given a list of specialized dentists to consider or you may choose to visit the Web site of the American Academy of Dental Sleep Medicine  -Oral devices are less likely to work if you have severe sleep apnea or are extremely overweight.     More detailed information  An oral appliance is a small acrylic device that fits over the upper and lower teeth  (similar to a retainer or a mouth guard). This device slightly moves jaw forward, which moves the base of the tongue forward, opens the airway, improves breathing for effective treat snoring and obstructive sleep apnea in perhaps 7 out of 10 people .  The best working devices are custom-made by a dental device  after a mold is made of the teeth 1, 2, 3.  When is an oral appliance  indicated?  Oral appliance therapy is recommended as a first-line treatment for patients with primary snoring, mild sleep apnea, and for patients with moderate sleep apnea who prefer appliance therapy to use of CPAP4, 5. Severity of sleep apnea is determined by sleep testing and is based on the number of respiratory events per hour of sleep.   How successful is oral appliance therapy?  The success rate of oral appliance therapy in patients with mild sleep apnea is 75-80% while in patients with moderate sleep apnea it is 50-70%. The chance of success in patients with severe sleep apnea is 40-50%. The research also shows that oral appliances have a beneficial effect on the cardiovascular health of TONO patients at the same magnitude as CPAP therapy7.  Oral appliances should be a second-line treatment in cases of severe sleep apnea, but if not completely successful then a combination therapy utilizing CPAP plus oral appliance therapy may be effective. Oral appliances tend to be effective in a broad range of patients although studies show that the patients who have the highest success are females, younger patients, those with milder disease, and less severe obesity. 3, 6.   Finding a dentist that practices dental sleep medicine  Specific training is available through the American Academy of Dental Sleep Medicine for dentists interested in working in the field of sleep. To find a dentist who is educated in the field of sleep and the use of oral appliances, near you, visit the Web site of the American Academy of Dental Sleep Medicine.    References  1. Tapan et al. Objectively measured vs self-reported compliance during oral appliance therapy for sleep-disordered breathing. Chest 2013; 144(5): 0452-5607.  2. Danya, et al. Objective measurement of compliance during oral appliance therapy for sleep-disordered breathing. Thorax 2013; 68(1): 91-96.  3. Jade et al. Mandibular advancement devices in 620 men and  women with TONO and snoring: tolerability and predictors of treatment success. Chest 2004; 125: 9975-8781.  4. Josafat et al. Oral appliances for snoring and TONO: a review. Sleep 2006; 29: 244-262.  5. Mahsa et al. Oral appliance treatment for TONO: an update. J Clin Sleep Med 2014; 10(2): 215-227.  6. Terence et al. Predictors of OSAH treatment outcome. J Dent Res 2007; 86: 0341-6883.      Weight Loss:    Weight loss is a long-term strategy that may improve sleep apnea in some patients.    Weight management is a personal decision and the decision should be based on your interest and the potential benefits.  If you are interested in exploring weight loss strategies, the following discussion covers the impact on weight loss on sleep apnea and the approaches that may be successful.    Being overweight does not necessarily mean you will have health consequences.  Those who have BMI over 35 or over 27 with existing medical conditions carries greater risk.   Weight loss decreases severity of sleep apnea in most people with obesity. For those with mild obesity who have developed snoring with weight gain, even 15-30 pound weight loss can improve and occasionally eliminate sleep apnea.  Structured and life-long dietary and health habits are necessary to lose weight and keep healthier weight levels.     Though there may be significant health benefits from weight loss, long-term weight loss is very difficult to achieve- studies show success with dietary management in less than 10% of people. In addition, substantial weight loss may require years of dietary control and may be difficult if patients have severe obesity. In these cases, surgical management may be considered.  Finally, older individuals who have tolerated obesity without health complications may be less likely to benefit from weight loss strategies.      [unfilled]    Surgery:    Surgery for obstructive sleep apnea is considered generally only when other  therapies fail to work. Surgery may be discussed with you if you are having a difficult time tolerating CPAP and or when there is an abnormal structure that requires surgical correction.  Nose and throat surgeries often enlarge the airway to prevent collapse.  Most of these surgeries create pain for 1-2 weeks and up to half of the most common surgeries are not effective throughout life.  You should carefully discuss the benefits and drawbacks to surgery with your sleep provider and surgeon to determine if it is the best solution for you.   More information  Surgery for TONO is directed at areas that are responsible for narrowing or complete obstruction of the airway during sleep.  There are a wide range of procedures available to enlarge and/or stabilize the airway to prevent blockage of breathing in the three major areas where it can occur: the palate, tongue, and nasal regions.  Successful surgical treatment depends on the accurate identification of the factors responsible for obstructive sleep apnea in each person.  A personalized approach is required because there is no single treatment that works well for everyone.  Because of anatomic variation, consultation with an examination by a sleep surgeon is a critical first step in determining what surgical options are best for each patient.  In some cases, examination during sedation may be recommended in order to guide the selection of procedures.  Patients will be counseled about risks and benefits as well as the typical recovery course after surgery. Surgery is typically not a cure for a person s TONO.  However, surgery will often significantly improve one s TONO severity (termed  success rate ).  Even in the absence of a cure, surgery will decrease the cardiovascular risk associated with OSA7; improve overall quality of life8 (sleepiness, functionality, sleep quality, etc).      Palate Procedures:  Patients with TONO often have narrowing of their airway in the region  of their tonsils and uvula.  The goals of palate procedures are to widen the airway in this region as well as to help the tissues resist collapse.  Modern palate procedure techniques focus on tissue conservation and soft tissue rearrangement, rather than tissue removal.  Often the uvula is preserved in this procedure. Residual sleep apnea is common in patient after pharyngoplasty with an average reduction in sleep apnea events of 33%2.      Tongue Procedures:  ExamWhile patients are awake, the muscles that surround the throat are active and keep this region open for breathing. These muscles relax during sleep, allowing the tongue and other structures to collapse and block breathing.  There are several different tongue procedures available.  Selection of a tongue base procedure depends on characteristics seen on physical exam.  Generally, procedures are aimed at removing bulky tissues in this area or preventing the back of the tongue from falling back during sleep.  Success rates for tongue surgery range from 50-62%3.    Hypoglossal Nerve Stimulation:  Hypoglossal nerve stimulation has recently received approval from the [place] Food and Drug Administration for the treatment of obstructive sleep apnea.  This is based on research showing that the system was safe and effective in treating sleep apnea6.  Results showed that the median AHI score decreased 68%, from 29.3 to 9.0. This therapy uses an implant system that senses breathing patterns and delivers mild stimulation to airway muscles, which keeps the airway open during sleep.  The system consists of three fully implanted components: a small generator (similar in size to a pacemaker), a breathing sensor, and a stimulation lead.  Using a small handheld remote, a patient turns the therapy on before bed and off upon awakening.    Candidates for this device must be greater than 18 years of age, have moderate to severe TONO (AHI between 15-65), BMI less than 35,  have tried CPAP/oral appliance for at least 8 weeks without success, and have appropriate upper airway anatomy (determined by a sleep endoscopy performed by Dr. Ryan Lyles).    Hypoglossal Nerve Stimulation Pathway:    The sleep surgeon s office will work with the patient through the insurance prior-authorization process (including communications and appeals).    Nasal Procedures:  Nasal obstruction can interfere with nasal breathing during the day and night.  Studies have shown that relief of nasal obstruction can improve the ability of some patients to tolerate positive airway pressure therapy for obstructive sleep apnea1.  Treatment options include medications such as nasal saline, topical corticosteroid and antihistamine sprays, and oral medications such as antihistamines or decongestants. Non-surgical treatments can include external nasal dilators for selected patients. If these are not successful by themselves, surgery can improve the nasal airway either alone or in combination with these other options.      Combination Procedures:  Combination of surgical procedures and other treatments may be recommended, particularly if patients have more than one area of narrowing or persistent positional disease.  The success rate of combination surgery ranges from 66-80%2,3.    References  Amy CHURCH. The Role of the Nose in Snoring and Obstructive Sleep Apnoea: An Update.  Eur Arch Otorhinolaryngol. 2011; 268: 1365-73.   Yony SM; Ekaterina JA; Perla JR; Pallanch JF; Giovanni MB; Ty SG; Oswaldo CORTEZ. Surgical modifications of the upper airway for obstructive sleep apnea in adults: a systematic review and meta-analysis. SLEEP 2010;33(10):8210-6912. Erika WICK. Hypopharyngeal surgery in obstructive sleep apnea: an evidence-based medicine review.  Arch Otolaryngol Head Neck Surg. 2006 Feb;132(2):206-13.  Oleksandr YH1, Mary Y, Oliver CARLOS ALBERTO. The efficacy of anatomically based multilevel surgery for obstructive sleep apnea.  Otolaryngol Head Neck Surg. 2003 Oct;129(4):327-35.  Erika WICK, Goldberg A. Hypopharyngeal Surgery in Obstructive Sleep Apnea: An Evidence-Based Medicine Review. Arch Otolaryngol Head Neck Surg. 2006 Feb;132(2):206-13.  Catarino LOPEZ et al. Upper-Airway Stimulation for Obstructive Sleep Apnea.  N Engl J Med. 2014 Jan 9;370(2):139-49.  Jaciel Y et al. Increased Incidence of Cardiovascular Disease in Middle-aged Men with Obstructive Sleep Apnea. Am J Respir Crit Care Med; 2002 166: 159-165  Royal EM et al. Studying Life Effects and Effectiveness of Palatopharyngoplasty (SLEEP) study: Subjective Outcomes of Isolated Uvulopalatopharyngoplasty. Otolaryngol Head Neck Surg. 2011; 144: 623-631.        WHAT IF I ONLY HAVE SNORING?    Mandibular advancement devices, lateral sleep positioning, long-term weight loss and treatment of nasal allergies have been shown to improve snoring.  Exercising tongue muscles with a game (https://[employer]/deanna/soundly-reduce-snoring/kx7477251057) or stimulating the tongue during the day with a device (https://doi.org/10.3390/zum78025757) have improved snoring in some individuals.    Remember to Drive Safe... Drive Alive     Sleep health profoundly affects your health, mood, and your safety.  Thirty three percent of the population (one in three of us) is not getting enough sleep and many have a sleep disorder. Not getting enough sleep or having an untreated / undertreated sleep condition may make us sleepy without even knowing it. In fact, our driving could be dramatically impaired due to our sleep health. As your provider, here are some things I would like you to know about driving:     Here are some warning signs for impairment and dangerous drowsy driving:              -Having been awake more than 16 hours               -Looking tired               -Eyelid drooping              -Head nodding (it could be too late at this point)              -Driving for more than 30 minutes     Some  things you could do to make the driving safer if you are experiencing some drowsiness:              -Stop driving and rest              -Call for transportation              -Make sure your sleep disorder is adequately treated     Some things that have been shown NOT to work when experiencing drowsiness while driving:              -Turning on the radio              -Opening windows              -Eating any  distracting  /  entertaining  foods (e.g., sunflower seeds, candy, or any other)              -Talking on the phone      Your decision may not only impact your life, but also the life of others. Please, remember to drive safe for yourself and all of us.

## 2023-08-28 NOTE — PROGRESS NOTES
Outpatient Sleep Medicine Consultation:      Name: Aris Colon MRN# [id number]   Age: 53 year old YOB: 1970     Date of Consultation: August 28, 2023  Consultation is requested by: Kathy De Paz MD  [address] [place] [id number] Kathy De Paz  Primary care provider: Mena Jauregui       Reason for Sleep Consult:     Aris Colon is sent by Kathy De Paz for a sleep consultation regarding insomnia           Assessment and Plan:   Chronic Insomnia -multifactorial.  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    Inadequate sleep hygiene: Encouraged to follow good sleep hygiene measures including reducing the caffeine consumption and avoiding caffeine within 6 hours before bed. He was also instructed to avoid using television in bed and avoiding napping during the day.    Psychophysiologic insomnia: We discussed cognitive behavioral therapy for insomnia.  Patient was interested in obtaining the referral to sleep psychology for CBT-I and the referral was provided.  Information regarding online CBT-I was provided as summary.    Anxiety/depression: We discussed the association of insomnia with anxiety and depression.  He reports since work injury, his whole life has changed. He was previously active and exercises regularly.  He is not able to exercise and is also has not been able to get medical treatment.  He denies suicidal ideation or thoughts of harming others.  He is currently on fluoxetine.  He has been following up with Dr. Bell. Recommended the patient to continue follow-up with their primary care provider for optimizing the management of anxiety and depression.    Delayed sleep phase: Recommended following a regular sleep-wake pattern every day, arrived at a goal bedtime of 2 AM and a goal wake time of 10AM every day of the  week. We discussed minimizing exposure to bright lights at least a couple hours before the goal bedtime.  He was instructed to avoid mind stimulating activities/screen time or use of electronics at least an hour before bed. Recommended one-half to one tab of over-the-counter melatonin 1 mg to be taken by mouth at 12 midnight.  He was instructed NOT TO USE zzquil since he will be taking  melatonin.  Recommended  using a  bright light box of 10,000 Lux intensity with exposure to bright light for 30 to 60 minutes soon after awakening at 10AM.     Snoring, occasional awakenings due to gasping for air and choking, nonrestorative sleep and fatigue, possible TONO:   Polysomnography reviewed  Recommended obtaining polysomnography to evaluate for TONO which will be obtained in accordance to patient's circadian rhythm  Obstructive sleep apnea and consequences of untreated sleep apnea were reviewed.  Information provided regarding treatment options for TONO.    Chronic refractory cough: According to his pulmonologist notes, he had exposure to overwhelming amounts of dusts in the workplace, with probable RADS leading to chronic refractory cough leading to supraglottic hyperfunction and laryngeal hyperreactivity.  He will continue follow-up with pulmonary.    Encouraged to follow healthy diet.(he has not been able to exercise due to the disabling cough and shortness of breath with activity)    Patient was strongly advised to avoid driving, operating any heavy machinery or other hazardous situations while drowsy or sleepy.  Patient was counseled on the importance of driving while alert, to pull over if drowsy, or nap before getting into the vehicle if sleepy.     Plan is to communicate the results of the sleep study with the patient via [employer].  Orders Placed This Encounter   Procedures    Comprehensive Sleep Study    AILIN Cervantes  ()    Sleep Psychology  Referral     Summary Counseling:    Sleep Testing  "Reviewed  Obstructive Sleep Apnea Reviewed  Complications of Untreated Sleep Apnea Reviewed  Cognitive behavioral therapy Reviewed    Medical Decision-making:   Educational materials provided in instructions    CC: Kathy De Paz      The above note was dictated using voice recognition software. Although reviewed after completion, some word and grammatical error may remain . Please contact the author for any clarifications.     \" Total time spent was 70 minutes for this appointment on this date of service which include time spent before, during and after the visit for chart review, patient care, counseling and coordination of care including documentation.\"      Alfredito Crawford MD  [facility]  [address] [place] [address]          History of Present Illness:   Aris Colon is a 53-year-old male who presents to sleep clinic today with reports of chronic insomnia and snoring.    Past Sleep Evaluations: none  According to his pulmonologist notes, he had exposure to overwhelming amounts of dusts in the workplace, with probable RADS leading to chronic refractory cough leading to supraglottic hyperfunction and laryngeal hyperreactivity.  Prior to the work related illness, he used to go to bed at 11 PM and would fall asleep by 12 midnight, sleep through the night  without any nocturnal awakenings.  Since the work related illness he has not been working.  It has been almost 1-1/2 years since he stopped working.  He started experiencing insomnia around the time of work injury, and that has gotten progressively worsened. He goes to bed around 11 PM and it takes a couple hours to fall asleep,  sometimes he cannot fall asleep until 3 or 4 AM.  Active mind, anxiety and depression affect sleep.    He tried decreasing caffeine and avoiding caffeine both of which did not help.  He tried melatonin 5 mg which helped him on the first night and the second night he did " not find it helpful.  ZzzQuil liquid so far has been helpful even though he still has difficulty falling asleep with it, he reports that he is able to sleep longer without any awakenings  He  turns the television on and feels that the bright light helps him to fall asleep.  When he goes to bed he is not always sleepy.    He wakes up 3-4 times during the night most of the times he wakes up due to coughing and when he wakes up in the night he uses the bathroom.  He is able to fall back asleep in 15 to 20 minutes following the awakening.  He has been sleeping on a mattress in his living room with the air conditioning on since cold air blowing helps him sleep better.  He wakes up between 11 AM to noon with an alarm.  He reports nonrestorative sleep, fatigue and excessive daytime sleepiness.  He takes naps 3 days a week and the nap can last for a couple hours and he does not always feel rested upon awakening from the nap.  He denies dozing off unintentionally during the day.  He denies drowsiness while driving.  Patient reports snoring, but there have not been any reports of observed apneas during sleep.  He reports occasional episodes of awakenings due to gasping for air and choking.  He reports dry mouth in the morning and occasional symptoms of acid reflux and is currently being treated with pantoprazole.  He denies symptoms suggestive of restless legs syndrome.  He denies sleepwalking, sleep eating, sleep talking or dream enactment behavior.  He denies cataplexy, sleep paralysis or hallucinations.      CAFFEINE AND OTHER SUBSTANCES:    Patient consumes caffeinated beverages per day:   3 cans of Pepsi  Last caffeine use is usually:  11 PM to 12 midnight  List of any prescribed or over the counter stimulants that patient takes:  No  List of any prescribed or over the counter sleep medication patient takes:  ZzzQuil  List of previous sleep medications that patient has tried:  Melatonin  Patient drinks alcohol to help  "them sleep:  No   Patient drinks alcohol near bedtime:  No    Family History:  Patient has a family member been diagnosed with a sleep disorder:  Father's TONO, uses CPAP        SCALES:    EPWORTH SLEEPINESS SCALE: 7 out of 24    INSOMNIA SEVERITY INDEX (MARÍA ELENA)          8/28/2023     2:00 PM   Insomnia Severity Index (MARÍA ELENA)   Difficulty falling asleep 3   Difficulty staying asleep 3   Problems waking up too early 3   How SATISFIED/DISSATISFIED are you with your CURRENT sleep pattern? 3   How NOTICEABLE to others do you think your sleep problem is in terms of impairing the quality of your life? 0   How WORRIED/DISTRESSED are you about your current sleep problem? 1   To what extent do you consider your sleep problem to INTERFERE with your daily functioning (e.g. daytime fatigue, mood, ability to function at work/daily chores, concentration, memory, mood, etc.) CURRENTLY? 1   MARÍA ELENA Total Score 14       Guidelines for Scoring/Interpretation:  Total score categories:  0-7 = No clinically significant insomnia   8-14 = Subthreshold insomnia   15-21 = Clinical insomnia (moderate severity)  22-28 = Clinical insomnia (severe)  Used via courtesy of www.Gen[employer] Mediumth.va.gov with permission from Jacoby Caceres PhD., [facility]      STOP BANG 4 out of 8 (snoring, tiredness, age greater than 50 and male gender)      8/28/2023     1:00 PM   STOP BANG Questionnaire (  2008, the American Society of Anesthesiologists, Inc. [employer]   B/P Clinic: 111/73         GAD7         No data to display                  CAGE-AID         No data to display                CAGE-AID reprinted with permission from the [place] Medical Journal, MARNIE Mendoza and MAEGAN Fung, \"Conjoint screening questionnaires for alcohol and drug abuse\" [place] Medical Journal 94: 135-140, 1995.      PATIENT HEALTH QUESTIONNAIRE-9 (PHQ - 9)         No data to display                Developed by Nancy Saravia, Kolton Silver " Landon and colleagues, with an educational raza from [employer] No permission required to reproduce, translate, display or distribute.        Allergies:    No Known Allergies    Medications:    Current Outpatient Medications   Medication Sig Dispense Refill    albuterol (PROAIR HFA/PROVENTIL HFA/VENTOLIN HFA) 108 (90 Base) MCG/ACT inhaler 2 puffs every 4 hours as needed      benzonatate (TESSALON) 100 MG capsule Take 1 capsule (100 mg) by mouth 3 times daily as needed for cough 30 capsule 4    FLUoxetine (PROZAC) 20 MG capsule       fluticasone-vilanterol (BREO ELLIPTA) 100-25 MCG/ACT inhaler       hydrOXYzine (ATARAX) 10 MG tablet       pantoprazole (PROTONIX) 40 MG EC tablet Take 1 tablet (40 mg) by mouth 2 times daily 180 tablet 2    sildenafil (REVATIO) 20 MG tablet TAKE 1-5 TABLETS BY MOUTH 30 MINS TO 4 HOURS BEFORE SEXUAL ACTIVITY      tiotropium (SPIRIVA RESPIMAT) 2.5 MCG/ACT inhaler Inhale 2 puffs into the lungs         Problem List:  There are no problems to display for this patient.       Past Medical/Surgical History:  Erectile dysfunction   Lumbar fusion 1993  Tendon repair, L wrist  Bilateral knee arthroscopy   Depression     Social History:  Social History     Socioeconomic History    Marital status:      Spouse name: Not on file    Number of children: Not on file    Years of education: Not on file    Highest education level: Not on file   Occupational History    Not on file   Tobacco Use    Smoking status: Never    Smokeless tobacco: Never   Substance and Sexual Activity    Alcohol use: Not on file    Drug use: Not on file    Sexual activity: Not on file   Other Topics Concern    Not on file   Social History Narrative    Not on file     Social Determinants of Health     Financial Resource Strain: Not on file   Food Insecurity: Not on file   Transportation Needs: Not on file   Physical Activity: Not on file   Stress: Not on file   Social Connections: Not on file   Intimate Partner Violence:  Not on file   Housing Stability: Not on file       Family History:  No family history on file.    Review of Systems:  A complete review of systems reviewed by me is negative with the exeption of what has been mentioned in the history of present illness and symptoms listed below:   Shortness of breath with activity, anxiety and depression. He denies suicidal ideations or thoughts of harming others    Physical Examination:  Vitals: /73   Pulse 90   Wt 93.1 kg (205 lb 3.2 oz)   SpO2 97%   BMI 29.44 kg/m    BMI= Body mass index is 29.44 kg/m .  General: No apparent distress, appropriately groomed  Head: Normocephalic, atraumatic  Nose, mouth and throat: Patent airflow through both the nares. Oropharynx: Mallampati Class: IV.Tonsillar Stage: 0  surgically removed   Neck:Circumference: <16 inches  Chest: No cough, no audible wheezing, able to talk in full sentences. CTA bilaterally, no rales or rhonchi  Cardiovascular: Regular S1 and S2; no gallops or murmurs  Psych: coherent speech, normal rate and volume, able to articulate logical thoughts, able   to abstract reason, no tangential thoughts, no hallucinations   or delusions  His affect is normal  Neuro:  Mental status: Alert and  Oriented X 3  Speech: normal          Data: All pertinent previous laboratory data reviewed     No results for input(s): NA, POTASSIUM, CHLORIDE, CO2, ANIONGAP, GLC, BUN, CR, HAYES in the last 81279 hours.    No results for input(s): WBC, RBC, HGB, HCT, MCV, MCH, MCHC, RDW, PLT in the last 30802 hours.    No results for input(s): PROTTOTAL, ALBUMIN, BILITOTAL, ALKPHOS, AST, ALT, BILIDIRECT in the last 23889 hours.    No results found for: TSH    No results found for: UAMP, UBARB, BENZODIAZEUR, UCANN, UCOC, OPIT, UPCP    No results found for: IRONSAT, GR22913, VAN    No results found for: PH, PHARTERIAL, PO2, XB2EAPPUECD, SAT, PCO2, HCO3, BASEEXCESS, BERNARD, BEB    Echocardiology: No results found for this or any previous visit (from the  past 4320 hour(s)).    Chest x-ray: No results found for this or any previous visit from the past 365 days.      Chest CT: No results found for this or any previous visit from the past 365 days.      PFT: Most Recent [place] Pulmonary Function Testing: No results found        Alfredito Crawford MD 8/28/2023

## 2023-09-12 ENCOUNTER — HOSPITAL ENCOUNTER (OUTPATIENT)
Dept: BEHAVIORAL HEALTH | Facility: CLINIC | Age: 53
Discharge: HOME | End: 2023-09-12
Attending: OTOLARYNGOLOGY | Admitting: OTOLARYNGOLOGY
Payer: COMMERCIAL

## 2023-09-12 PROCEDURE — 90791 PSYCH DIAGNOSTIC EVALUATION: CPT | Performed by: COUNSELOR

## 2023-09-12 ASSESSMENT — ANXIETY QUESTIONNAIRES
5. BEING SO RESTLESS THAT IT IS HARD TO SIT STILL: NOT AT ALL
4. TROUBLE RELAXING: NOT AT ALL
6. BECOMING EASILY ANNOYED OR IRRITABLE: SEVERAL DAYS
1. FEELING NERVOUS, ANXIOUS, OR ON EDGE: SEVERAL DAYS
7. FEELING AFRAID AS IF SOMETHING AWFUL MIGHT HAPPEN: NOT AT ALL
2. NOT BEING ABLE TO STOP OR CONTROL WORRYING: SEVERAL DAYS
GAD7 TOTAL SCORE: 5
GAD7 TOTAL SCORE: 5
3. WORRYING TOO MUCH ABOUT DIFFERENT THINGS: MORE THAN HALF THE DAYS

## 2023-09-12 ASSESSMENT — COLUMBIA-SUICIDE SEVERITY RATING SCALE - C-SSRS
1. IN THE PAST MONTH, HAVE YOU WISHED YOU WERE DEAD OR WISHED YOU COULD GO TO SLEEP AND NOT WAKE UP?: NO
3. HAVE YOU BEEN THINKING ABOUT HOW YOU MIGHT KILL YOURSELF?: NO
1. IN THE PAST MONTH, HAVE YOU WISHED YOU WERE DEAD OR WISHED YOU COULD GO TO SLEEP AND NOT WAKE UP?: NO
5. HAVE YOU STARTED TO WORK OUT OR WORKED OUT THE DETAILS OF HOW TO KILL YOURSELF? DO YOU INTEND TO CARRY OUT THIS PLAN?: NO
2. HAVE YOU ACTUALLY HAD ANY THOUGHTS OF KILLING YOURSELF IN THE PAST MONTH?: NO
6. HAVE YOU EVER DONE ANYTHING, STARTED TO DO ANYTHING, OR PREPARED TO DO ANYTHING TO END YOUR LIFE?: NO
2. HAVE YOU ACTUALLY HAD ANY THOUGHTS OF KILLING YOURSELF LIFETIME?: NO
4. HAVE YOU HAD THESE THOUGHTS AND HAD SOME INTENTION OF ACTING ON THEM?: NO

## 2023-09-12 ASSESSMENT — PAIN SCALES - GENERAL: PAINLEVEL: NO PAIN (0)

## 2023-09-12 ASSESSMENT — PATIENT HEALTH QUESTIONNAIRE - PHQ9: SUM OF ALL RESPONSES TO PHQ QUESTIONS 1-9: 10

## 2023-09-12 NOTE — ADDENDUM NOTE
Encounter addended by: Kvng Avina, [facility], [facility] on: 9/12/2023 6:40 PM   Actions taken: Clinical Note Signed

## 2023-09-12 NOTE — PROGRESS NOTES
[address] [place] [address]      9/12/2023      Aris Cloon  [address] [place] [id number]      Dear Mr. Colon    This is to verify that Aris Colon (1970) was here for a Mental Health DIAGNOSTIC ASSESSMENT on 9/12/23.   Recommendation:  The following referral(s) will be initiated: [facility]  Psychiatry. Next Scheduled Appointment: Date: Tuesday, 9/26/2023  Time: 11:00 am - 12:00 pm  Provider: Ashley Samaniego  Supervised by: Steven Severson  MS  LICSW  Location: [facility], [address] [place] [address], [facility] [address] [address]  Phone: [phone]  Type: Therapy - Initial (In-Person)    Date: Monday, 9/25/2023  Time: 10:00 am - 11:00 am  Provider: Jesus Mittal  MSN  CNP  Location: [facility], [address] [place] [address]  Phone: [phone]  Type: Medication Mgmt - Initial (In-Person)      It is recommended that patient attend Individual Teletherapy at [facility] starting 9/26/23 and Medication Management at [facility] on 9/25/23. Patient is encouraged to consult with employer regarding taking some time off work to address these issues.     Please do not hesitate to contact me with any questions.        Sincerely,     Kvng Avina MA, LPCC, LADC, Evaluation Counselor   [address] [address]   [email or web address]   Tel: [phone]  Fax: [phone]   Gender pronouns: he/him   Employed by: [employer]

## 2023-09-12 NOTE — PROGRESS NOTES
"[facility]      PATIENT'S NAME: Aris Colon  PREFERRED NAME: Derrick  PRONOUNS:   He/Him    MRN: [id number]  : 1970  ADDRESS: [address] [place] [address]  ACCT. NUMBER:  [id number]  DATE OF SERVICE: 23  START TIME: 1:00 PM  END TIME: 3:06 PM  PREFERRED PHONE: [phone]  May we leave a program related message: Yes  SERVICE MODALITY:  In-person    UNIVERSAL ADULT Mental Health DIAGNOSTIC ASSESSMENT    Identifying Information:  Patient is a 53 year old,    individual.  Patient was referred for an assessment by Rehab staff initially scheduled .  Patient attended the session alone.    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 He reports a work related injury affecting his lung and breathing that has taken an emotional & physical tool on patient. Patient reports difficulty getting services and doctors appointments approved by insurance. He states that he eventually had too the take the company to court because everything is denied including short term and long term disability. He reports missing 6 months of therapy, in debt of 10 thousands to his brother which caused a lot of stress, difficulty sleeping and unable to work. Patient is currently that his life would never be the same again given the limited things he can do at this point. \"   The problem(s) began one and half year ago. Patient has not attempted to resolve these concerns in the past.    Social/Family History:  Patient reported that he grew up in [place], [place].  He was raised by biological parents.  Parents stayed ..   Patient reported that his childhood was good, active and busy.  Patient described his current relationships " with family of origin as good.      The patient describes his cultural background as .  Cultural influences and impact on patient's life structure, values, norms, and healthcare:  none identified .  Contextual influences on patient's health include: NA.  Cultural, Contextual, and socioeconomic factors do not affect the patient's access to services.  These factors will be addressed in the Preliminary Treatment plan.  Patient identified his preferred language to be English. Patient reported that he does not  need the assistance of an  or other support involved in therapy.     Patient reported had no significant delays in developmental tasks.   Patient's highest education level was some college. Patient identified the following learning problems: none reported.  Modifications will be used to assist communication in therapy.  Patient reports that he is able to understand written materials.    Patient reported the following relationship history :NA.  Patient's current relationship status is  for 17 years,  for 5 and half years.   Patient identified his sexual orientation as heterosexual.  Patient reported having three child(andrew). Patient identified parents, mother, father, friends, and two neighbors  as part of his support system.  Patient identified the quality of these relationships as good.     Patient's current living/housing situation involves staying in own home/apartment. He  lives alone and they report that housing is stable.     Patient is currently  on work comp leave .  Patient reports that his finances are obtained through family.  Patient does identify finances as a current stressor.      Patient reported that they have not been involved with the legal system. Patient denies being on probation / parole / under the jurisdiction of the court.    Patient's Strengths and Limitations:  Patient identified the following strengths or resources that will help them succeed in  treatment: commitment to health and well being, community involvement, exercise routine, friends / good social support, family support, insight, and motivation. Things that may interfere with the patient's success in treatment include: few friends, financial hardship, lack of family support, lack of social support, and physical health concerns.     Assessments:  The following assessments were completed by patient for this visit:  PHQ9:       9/12/2023     1:00 PM   PHQ-9 SCORE   PHQ-9 Total Score 10     GAD7:       9/12/2023     1:00 PM   SHERIDAN-7 SCORE   Total Score 5     CAGE-AID:       9/12/2023     1:00 PM   CAGE-AID Total Score   Total Score 0     PROMIS 10-Global Health (all questions and answers displayed):       9/12/2023     1:00 PM   PROMIS 10   In general, would you say your health is: 2   In general, would you say your quality of life is: 2   In general, how would you rate your physical health? 2   In general, how would you rate your mental health, including your mood and your ability to think? 2   In general, how would you rate your satisfaction with your social activities and relationships? 1   In general, please rate how well you carry out your usual social activities and roles. (This includes activities at home, at work and in your community, and responsibilities as a parent, child, spouse, employee, friend, etc.) 1   To what extent are you able to carry out your everyday physical activities such as walking, climbing stairs, carrying groceries, or moving a chair? 2   In the past 7 days, how often have you been bothered by emotional problems such as feeling anxious, depressed, or irritable? 5   In the past 7 days, how would you rate your fatigue on average? 3   In the past 7 days, how would you rate your pain on average, where 0 means no pain, and 10 means worst imaginable pain? 0   Global Mental Health Score 6   Global Physical Health Score 12   PROMIS TOTAL - SUBSCORES 18     [place] Suicide Severity  Rating Scale (Lifetime/Recent)      9/12/2023     1:00 PM   [place] Suicide Severity Rating (Lifetime/Recent)   Q1 Wish to be Dead (Lifetime) No   Q2 Non-Specific Active Suicidal Thoughts (Lifetime) No   Q1 Wished to be Dead (Past Month) no   Q2 Suicidal Thoughts (Past Month) no   Q3 Suicidal Thought Method no   Q4 Suicidal Intent without Specific Plan no   Q5 Suicide Intent with Specific Plan no   Q6 Suicide Behavior (Lifetime) no   Level of Risk per Screen low risk       Personal and Family Medical History:  Patient does not report a family history of mental health concerns.  Patient reports family history is not on file..     Patient does report Mental Health Diagnosis and/or Treatment.  Patient reported the following previous diagnoses which include(s): depression  Patient reported symptoms began a long time ago.   Patient has not received mental health services in the past:  none .  Psychiatric Hospitalizations: None.  Patient denies a history of civil commitment.  Patient is not receiving other mental health services.  These include none.       Patient has had a physical exam to rule out medical causes for current symptoms.  Date of last physical exam was within the past year. Client was encouraged to follow up with PCP if symptoms were to develop. The patient has a non-[place] [facility] Primary Care Provider. Their PCP is [facility], PCP prescribes; Anat Vaz @ [facility]..  Patient reports the following current medical concerns: lung disease and the following current dental concerns: needs dental care .  Patient denies any issues with pain..   There are not significant appetite / nutritional concerns / weight changes. These may include: low appetite. Patient reports the following sleep concerns:  frequent awakening at night.   Patient does not report a history of head injury / trauma / cognitive impairment.      Patient reports current meds as:   Outpatient Medications Marked as Taking  for the 9/12/23 encounter (Hospital Encounter) [facility]   Medication Sig    albuterol (PROAIR HFA/PROVENTIL HFA/VENTOLIN HFA) 108 (90 Base) MCG/ACT inhaler 2 puffs every 4 hours as needed    benzonatate (TESSALON) 100 MG capsule Take 1 capsule (100 mg) by mouth 3 times daily as needed for cough    FLUoxetine (PROZAC) 20 MG capsule     fluticasone-vilanterol (BREO ELLIPTA) 100-25 MCG/ACT inhaler     hydrOXYzine (ATARAX) 10 MG tablet     pantoprazole (PROTONIX) 40 MG EC tablet Take 1 tablet (40 mg) by mouth 2 times daily    sildenafil (REVATIO) 20 MG tablet TAKE 1-5 TABLETS BY MOUTH 30 MINS TO 4 HOURS BEFORE SEXUAL ACTIVITY    tiotropium (SPIRIVA RESPIMAT) 2.5 MCG/ACT inhaler Inhale 2 puffs into the lungs       Medication Adherence:  Patient reports taking prescribed medications as prescribed.    Patient Allergies:  No Known Allergies    Medical History:  History reviewed. No pertinent past medical history.      Current Mental Status Exam:   Appearance:  Appropriate    Eye Contact:  Good   Psychomotor:  Normal       Gait / station:  no problem  Attitude / Demeanor: Pleasant  Speech      Rate / Production: Emotional Talkative      Volume:  Normal  volume      Language:  intact  Mood:   Sad   Affect:   Tearful   Thought Content: Clear   Thought Process: Circumstantial      Associations: No loosening of associations  Insight:   Fair   Judgment:  Intact   Orientation:  All  Attention/concentration: Good    Substance Use:  Patient did not report a family history of substance use concerns; see medical history section for details.  Patient has not received chemical dependency treatment in the past.  Patient has never been to detox.      Patient is not currently receiving any chemical dependency treatment. Patient reported the following problems as a result of his substance use:   none reported .    Patient denies using alcohol.  Patient denies using tobacco.  Patient denies using  cannabis.  Patient reports trying to cut down on pop right now  Patient reports using/abusing the following substance(s). Patient reported no other substance use.     Substance Use: No symptoms    Based on the negative CAGE score and clinical interview there  are not indications of drug or alcohol abuse.    Significant Losses / Trauma / Abuse / Neglect Issues:   Patient did not serve in the .  There are indications or report of significant loss, trauma, abuse or neglect issues related to: are no indications and client denies any losses, trauma, abuse, or neglect concerns.  Concerns for possible neglect are not present.     Safety Assessment:   Patient denies current homicidal ideation and behaviors.  Patient denies current self-injurious ideation and behaviors.    Patient denied risk behaviors associated with substance use.  Patient denies any high risk behaviors associated with mental health symptoms.  Patient reports the following current concerns for his personal safety: None.  Patient reports there are no firearms in the house.       There are no firearms in the home..    History of Safety Concerns:  Patient denied a history of homicidal ideation.     Patient denied a history of personal safety concerns.    Patient denied a history of assaultive behaviors.    Patient denied a history of sexual assault behaviors.     Patient denied a history of risk behaviors associated with substance use.  Patient denies any history of high risk behaviors associated with mental health symptoms.  Patient reports the following protective factors:  dedication to family or friends; sense of belonging; help seeking behaviors when distressed; abstinence from substances; adherence with prescribed medication; uses community crisis resources; commitment to well-being; healthy fear of risky behaviors or pain.    Risk Plan:  See Recommendations for Safety and Risk Management Plan    Review of Symptoms per patient report:    Depression: Change in sleep, Lack of interest, Excessive or inappropriate guilt, Change in energy level, Change in appetite, Ruminations, Feeling sad, down, or depressed, and Withdrawn  Rebecca:  No Symptoms  Psychosis: No Symptoms  Anxiety: Excessive worry, Nervousness, Sleep disturbance, Ruminations, and Irritability  Panic:  Palpitations  Post Traumatic Stress Disorder:  No Symptoms   Eating Disorder: No Symptoms  ADD / ADHD:  No symptoms  Conduct Disorder: No symptoms  Autism Spectrum Disorder: No symptoms  Obsessive Compulsive Disorder: No Symptoms    Patient reports the following compulsive behaviors and treatment history:  none reported .      Diagnostic Criteria:       Major Depressive Disorder  CRITERIA (A-C) REPRESENT A MAJOR DEPRESSIVE EPISODE - SELECT THESE CRITERIA  A) Recurrent episode(s) - symptoms have been present during the same 2-week period and represent a change from previous functioning 5 or more symptoms (required for diagnosis)   - Depressed mood. Note: In children and adolescents, can be irritable mood.     - Diminished interest or pleasure in all, or almost all, activities.    - Decreased sleep.    - Fatigue or loss of energy.    - Diminished ability to think or concentrate, or indecisiveness.   B) The symptoms cause clinically significant distress or impairment in social, occupational, or other important areas of functioning  C) The episode is not attributable to the physiological effects of a substance or to another medical condition  D) The occurence of major depressive episode is not better explained by other thought / psychotic disorders  E) There has never been a manic episode or hypomanic episode    Generalized Anxiety Disorder  A. Excessive anxiety and worry about a number of events or activities (such as work or school performance).   B. The person finds it difficult to control the worry.  C. Select 3 or more symptoms (required for diagnosis). Only one item is required in  children.   - Restlessness or feeling keyed up or on edge.    - Being easily fatigued.    - Difficulty concentrating or mind going blank.    - Irritability.    - Sleep disturbance (difficulty falling or staying asleep, or restless unsatisfying sleep).   D. The focus of the anxiety and worry is not confined to features of an Axis I disorder.  E. The anxiety, worry, or physical symptoms cause clinically significant distress or impairment in social, occupational, or other important areas of functioning.   F. The disturbance is not due to the direct physiological effects of a substance (e.g., a drug of abuse, a medication) or a general medical condition (e.g., hyperthyroidism) and does not occur exclusively during a Mood Disorder, a Psychotic Disorder, or a Pervasive Developmental Disorder.      Functional Status:  Patient reports the following functional impairments:  chronic disease management, health maintenance, management of the household and or completion of tasks, money management, self-care, social interactions, work / vocational responsibilities, and home life .     Nonprogrammatic care:  Patient is requesting basic services to address current mental health concerns.    Clinical Summary:  1. Reason for assessment: seeking the appropriate level of care.  2. Psychosocial, Cultural and Contextual Factors: unemployed and medical issues  .  3. Principal DSM5 Diagnoses  (Sustained by DSM5 Criteria Listed Above):   296.83 (F06.32) Major Depressive Disorder: Depressive Disorder Due to another medical condition  4. Other Diagnoses that is relevant to services:   300.02 (F41.1) Generalized Anxiety Disorder  5. Provisional Diagnosis: none.  6. Prognosis: Expect Improvement and Relieve Acute Symptoms.  7. Likely consequences of symptoms if not treated: patient's ongoing symptoms are more than likely to get worse and experience a decreased daily in functioning and may require a higher level of care.  8. Client strengths  include:  committed to sobriety, educated, goal-focused, motivated, and support of family, friends and providers .     Recommendations:     1. Plan for Safety and Risk Management:   Safety and Risk: Recommended that patient call 911 or go to the local ED should there be a change in any of these risk factors..          Report to child / adult protection services was NA.     2. Patient's identified no arnaldo / Lutheran / spiritual influences relevant to referral services.    3. Initial Treatment will focus on:   Depressed Mood -    Anxiety -    Functional Impairment at: work  Risk Management / Safety Concerns related to: none .     4. Resources/Service Plan:    services are not indicated.   Modifications to assist communication are not indicated.   Additional disability accommodations are not indicated.      5. Collaboration:   Collaboration / coordination of treatment will be initiated with the following  support professionals: Targeted Case Management (TCM).      6.  Referrals:   The following referral(s) will be initiated: [facility]  Psychiatry. Next Scheduled Appointment: Date: Tuesday, 9/26/2023  Time: 11:00 am - 12:00 pm  Provider: Ashley Samaniego  Supervised by: Steven Severson MS  LICSW  Location: [facility], [address] [place] [address], [facility] [address], [place] [address]  Phone: [phone]  Type: Therapy - Initial (In-Person)    Date: Monday, 9/25/2023  Time: 10:00 am - 11:00 am  Provider: Jesus CHOUDHURY  CNP  Location: [facility], [address] [place] [address]  Phone: [phone]  Type: Medication Mgmt - Initial (In-Person).      A Release of Information has been obtained for the following: Targeted Case Management (TCM). Emergency contact: Aaron Colon (Father) phone#: [phone]         Emergency Contact JENNIFER was obtained.      Clinical Substantiation/medical necessity for the above recommendations:  Patient is a 53-year-old heterosexual  " single male who presents with a history of Depression. 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 He reports a work-related injury affecting his lung and breathing that has taken an emotional & physical tool on patient. Patient reports difficulty getting services and doctors' appointments approved by insurance. He states that he eventually had too the take the company to court because everything is denied including short term and long-term disability. He reports missing 6 months of therapy, in debt of 10 thousand to his brother which caused a lot of stress, difficulty sleeping and unable to work. Patient is currently that his life would never be the same again given the limited things he can do at this point.\" Patient denies a history of civil commitment.  Patient is not currently receiving any mental health services.       Patient endorses with Change in sleep, Lack of interest, Excessive or inappropriate guilt, change in energy level, change in appetite, Ruminations, feeling sad, down, or depressed, and Withdrawn  Excessive worry, Nervousness, Sleep disturbance, Ruminations, and Irritability.     Patient denies any past suicidal behaviors or current suicidal ideations/thoughts in the past month. Patient agrees with referral to individual therapy at [facility] on 9/26/23 and Medication management at [facility] on 9/25/23. Patient's acute suicide risk was determined to be low due to the following factors: Denial of current suicidal ideations and past suicidal behaviors. Patient is not currently under the influence of alcohol or illicit substances, denies experiencing command hallucinations, and has no direct access to firearms. Patient's acute risk could be higher if noncompliant with treatment plan, medications, follow-up appointments or " using illicit substances or alcohol. Protective factors include dedication to family or friends; sense of belonging; help seeking behaviors when distressed; abstinence from substances; adherence with prescribed medication; uses community crisis resources; commitment to well-being; healthy fear of risky behaviors or pain. Patient denies any suicidal ideations and thoughts in the past month or has prior suicidal behaviors, and is not currently using illicit substances, therefore, a safety plan was not developed. Patient instructed to present to his nearest emergency room if symptoms deteriorate.    7. NIKOLE:    NIKOLE:  Discussed the general effects of drugs and alcohol on health and well-being. Provider gave patient printed information about the effects of chemical use on their health and well being. Recommendations:  none .     8. Records:   These were reviewed at time of assessment.   Information in this assessment was obtained from the medical record and  provided by patient who is a fair historian.    Patient will have open access to their mental health medical record.    9.   Interactive Complexity: No    Provider Name/ Credentials:  DOROTHY Randall,  [facility]   Phone: [phone]  Fax: [phone]     September 12, 2023

## 2023-09-13 ENCOUNTER — HOSPITAL ENCOUNTER (OUTPATIENT)
Dept: RADIOLOGY | Facility: HOSPITAL | Age: 53
Discharge: HOME | End: 2023-09-13
Attending: OTOLARYNGOLOGY
Payer: OTHER MISCELLANEOUS

## 2023-09-13 ENCOUNTER — HOSPITAL ENCOUNTER (OUTPATIENT)
Dept: SPEECH THERAPY | Facility: HOSPITAL | Age: 53
Discharge: HOME | End: 2023-09-13
Admitting: OTOLARYNGOLOGY
Payer: OTHER MISCELLANEOUS

## 2023-09-13 DIAGNOSIS — R05.9 COUGH, UNSPECIFIED TYPE: ICD-10-CM

## 2023-09-13 PROCEDURE — 74230 X-RAY XM SWLNG FUNCJ C+: CPT

## 2023-09-13 PROCEDURE — 74221 X-RAY XM ESOPHAGUS 2CNTRST: CPT

## 2023-09-13 PROCEDURE — 250N000013 HC RX MED GY IP 250 OP 250 PS 637: Performed by: OTOLARYNGOLOGY

## 2023-09-13 PROCEDURE — 92611 MOTION FLUOROSCOPY/SWALLOW: CPT | Mod: GN

## 2023-09-13 RX ADMIN — ANTACID/ANTIFLATULENT 4 G: 380; 550; 10; 10 GRANULE, EFFERVESCENT ORAL at 10:18

## 2023-09-13 NOTE — ADDENDUM NOTE
Encounter addended by: Kvng Avina, [facility], [facility] on: 9/13/2023 7:33 AM   Actions taken: Clinical Note Signed

## 2023-09-13 NOTE — ADDENDUM NOTE
Enc[facility]: [facility] on: 9/13/2023 7:31 AM   Actions taken: Pend clinical note, Delete clinical note
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
## 2023-09-15 ENCOUNTER — DOCUMENTATION ONLY (OUTPATIENT)
Dept: OTOLARYNGOLOGY | Facility: CLINIC | Age: 53
End: 2023-09-15
Payer: COMMERCIAL

## 2023-09-15 NOTE — PROGRESS NOTES
Video Esophagram Review Rounds  Imaging Review of MBSS conducted with attending physician Kathy De Paz and reviewed/discussed with SLP Jerrica Brown, Aida Ely, Rosanna Davies, and/or Cris Leslie    Relevant Background:    Esophagram: 9/13/23  normal    MBSS Date: 9/13/23    Findings:  Pharyngeal Weakness:No  Epiglottic dysfunction: No  Penetration: No  Aspiration: No  UES dysfunction: No  Details: safe swallow      Recommendations:  Diet:needs voice therapy

## 2023-09-19 ENCOUNTER — TELEPHONE (OUTPATIENT)
Dept: OTOLARYNGOLOGY | Facility: CLINIC | Age: 53
End: 2023-09-19
Payer: COMMERCIAL

## 2023-09-19 NOTE — TELEPHONE ENCOUNTER
LVM to reschedule to Dean     2-hour evaluation, followed by 4 biweekly visits, all with Dean. Please then cancel appointments with me.       Gave direct number      Can do in person or virtual for follow up visits

## 2023-11-16 NOTE — PROGRESS NOTES
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 Laryngoscopy revealed mild vocal fold redness and swelling bilaterally, mild redness and swelling of the posterior cricoid region and arytenoids, and moderate supraglottic hyperfunction. He was highly stimulable for reduced laryngeal hyper-reactivity through use of abductory cough avoidance tasks, specifically the rapid sniff and sh sh sh sh sh maneuver. He perceptually demonstrated mildly rough and mild-moderately strained voice quality, lower than normal FO (97Hz), non optimal respiratory mechanics, thyrohyoid space closure upon phonation, and near constant chronic coughing which appears non productive and consistent with upper airway hypersensitivity. He would benefit from a course of speech therapy targeting reduced laryngeal irritation (LPR, chronic cough and throat clearing), improved respiratory mechanics, reduced perilaryngeal hyperfunction, and phonatory patterns targeting reduced laryngeal hyperfunction.     INTERVAL HISTORY:    Dr. De Paz made numerous recommendations following initial evaluation including behavioral health, pulmonology, and x-ray swallow study with esophagram.  After swallow rounds it was determined that swallow was safe and no swallowing specific intervention was recommended.  Further work with voice therapy to address laryngeal pharyngeal hypersensitivity was recommended.  Patient presents to initiate this formally today with time allotted for reevaluation should symptoms have altered in the  interim.    11/17/2023 - author: Dean Dutta - Today he reports that he just got done having an episode where he was coughing to the point of emesis and hemoptysis. Feels like his throat is crushing when he has the symptoms and heaviness in his chest. Mentions that he was told to try and suppress the cough, but finds that he is unable to remember to do so.  Had a recent exacerbation symptoms with coughing to the point of emesis and hemoptysis.  He was seen at [facility] in [place] through the emergency department.  Evaluation that day demonstrated normal CXR, with no other visualization completed per patient report (records are not visible at the time of this appointment) Currently on a 10 day course prednisone, no benefit.  Still coughing some blood, but not as much at this point.      Although foundational names remain consistent with prior evaluation, recent change in status and exacerbation warrant repeat evaluation to rule out structural change that may alter plan of care.    OBJECTIVE    PERCEPTUAL EVALUATION (CPT 06937)  POSTURE / TENSION:   neck and shoulders  Greatly increased visible tension in neck shoulders jaw and face during the lead up to coughing    BREATHING:   shallow  phonation is not coordinated with respiration    VOICE:  Roughness: Moderate Consistent  Breathiness: Moderate Consistent  Strain: Mild to moderate Consistent  Loudness  Conversational speech:  Mild to moderately reduced  Projected speech:  not assessed to avoid exacerbation of cough  Pitch:  Conversational speech:  WNL  Pitch glide:   Within functional limits  Resonance:  Conversational speech:  laryngeal pharyngeal resonance  Singing vs. Speech: Consistent across contexts    COUGH/THROAT CLEARING:  Patient presents with frequent bouts of extremely heavy dry coughing   Even with the most aggressive cough it is minimally productive at best  Smaller throat clearing and cough noted intermittently  Significant decrease in the  frequency of cough was seen after laryngeal evaluation which included administration of topical nasal lidocaine    LARYNGEAL EXAMINATION (CPT 87712)  Procedure: Flexible endoscopy with chip-tip technology without stroboscopy, left nostril; topical anesthesia with 3% Lidocaine and 0.25% phenylephrine was applied.   Performed by:  Dean Dutta CCC-SLP  The laryngeal and pharyngeal structures were evaluated for gross appearance, mobility, function, and focal lesions / abnormalities of the associated mucosa.    All findings were within normal limits with the exception of the following salient features:   Mild to Moderate cobblestoning of the posterior pharyngeal wall, with bumpy full appearance of the pharynx overall  Vocal folds have bilateral fullness and subtle irregularity of mucosal appearance without focal lesion or hemorrhage  No visible sign of bleeding or ulceration is seen in the larynx, pharynx, or proximal trachea  With phonatory tasks there is intermittent moderate to severe four-way constrictive supraglottic hyperfunction, though this is notably seen to decrease with higher flow and lower intensity  With repetitive tasks brisk motion is seen bilaterally, with slightly greater range of motion on left than right, though adequate laryngeal airway is able to be achieved across the exam  Patient was noted to be able to arrest cough during the exam with focus on intentional relaxation  Rescue breathing strategies were effective in abduction of the vocal folds with minimal support      Plain light and narrowband imaging of mucosal status      Supraglottic hyperfunction during running speech    The laryngeal exam was reviewed with Mr. Colon, and I provided pertinent explanations, as well as written and oral information.    ASSESSMENT / PLAN  IMPRESSIONS: Derrick Colon presents today with ongoing chronic cough which began in conjunction with workplace particle exposure, and with recent exacerbation  including hemoptysis and emesis.  Laryngeal evaluation today was warranted given recent episodes of hemoptysis to ensure the patient was not recovering from hemorrhagic or ulcerative event.  This demonstrated no acute trauma which is fortunate, though diffuse change to the vocal folds and surrounding tissues from longstanding cough was seen consistent with previous evaluation if slightly worsened.  Cough response was aggressive to the application of oxymetazoline nasal spray, and guarding and light cough was noted to occur even prior to the administration, which underscores both the hypersensitivity as well as the stress base response to triggers that has developed over time.  Within the exam itself patient was able to attenuate cough through focused relaxation which bodes well for his ability to incorporate therapeutic strategies to reduce cough, though as we discussed full resolution of symptoms is often challenging due to their multifactorial nature especially in the case of irritant exposure.    RECOMMENDATIONS:   Ongoing medically necessary speech therapy continues to be warranted help reduce impactful patterns of laryngeal activation which contribute to ongoing chronic cough and laryngeal pharyngeal hypersensitivity  Continue with current plan of care  For session accomplished on the same day as today's visit please see associated note below    This treatment plan was developed with the patient who agreed with the recommendations.    _______________________________________________________________________  THERAPY NOTE (CPT 30742)  Date of Service: 11/17/2023    SUBJECTIVE / OBJECTIVE:  Please refer to my evaluation report from today's encounter for full details regarding subjective data, patient reported measures, and diagnostic findings.    THERAPEUTIC ACTIVITIES  Counseling and Education  Asked many questions about the nature of his symptoms, and I answered all of these thoroughly.    Instructed concepts and  techniques for optimal vocal hygiene including:  Systemic hydration, including strategies for increasing daily water intake  Currently ~40 ounces  Recommended 64 ounces  Topical hydration - Gargling, humidification  Steam is a known trigger for the patient and discussion of adequate but not excessive humidity as well as had in conjunction with this  Environmental barriers to healthy voicing - noise, inhaled irritants, room acoustics  Management of laryngopharyngeal reflux disease (LPRD)  Avoiding eating or drinking within 2-3 hours of bedtime  Avoiding eating and drinking immediately prior to rigorous activity  Proper timing and use of acid reflux medication discussed in general context with recommendation of follow-up with pharmacist for detailed instructions    Chronic cough / throat clearing reduction therapy  Suppression and substitution strategies were instructed including  Swallowing substitution techniques  Breathing suppression techniques to reduce laryngeal tension  Low impact glottic coup and soft cough  Techniques to raise awareness of habitual throat clearing  Additionally he was instructed to keep a log of what circumstances are eliciting cough / throat clear    Concepts of an optimal regimen for practice were instructed.  He should use an interval schedule of practice, with brief periods of practice frequently throughout each day  [place] concepts of volitional practice to facilitate motor learning.  I provided handouts of today's therapeutic activities to facilitate practice.    ASSESSMENT/PLAN  PROGRESS TOWARD LONG TERM GOALS:   Minimal at this point, as this is first session, but good learning today    IMPRESSIONS: Chronic cough (R05.3) and dysphonia (R49.0) in the context of irritable larynx syndrome (J38.7) following workplace exposure to airborne irritants.  Derrick is very motivated for improvement, and reported good understanding of therapeutic rationales.  He was able to demonstrate techniques  with adequate accuracy, and I feel confident he will work to apply them at home.    PLAN: Derrick is currently scheduled to be seen by my colleague Ora Shepard, CCC-SLP in late December 2023, but will be seen sooner by myself or sooner as schedule permits.      TOTAL SERVICE TIME: 100 minutes  EVALUATION OF VOICE AND RESONANCE (88141)  TREATMENT (17042)  ENDOSCOPIC LARYNGEAL EXAMINATION WITHOUT STROBOSCOPY (21661)  NO CHARGE FACILITY FEE (22145)    Mark Dutta M.M., M.A., CCC-SLP  Speech-Language Pathologist  Certificate of Vocology  [phone]    *this report was created in part through the use of computerized dictation software, and though reviewed following completion, some typographic errors may persist.  If there is confusion regarding any of this notes contents, please contact me for clarification.*

## 2023-11-17 ENCOUNTER — PRE VISIT (OUTPATIENT)
Dept: OTOLARYNGOLOGY | Facility: CLINIC | Age: 53
End: 2023-11-17

## 2023-11-17 ENCOUNTER — OFFICE VISIT (OUTPATIENT)
Dept: OTOLARYNGOLOGY | Facility: CLINIC | Age: 53
End: 2023-11-17
Attending: OTOLARYNGOLOGY
Payer: OTHER MISCELLANEOUS

## 2023-11-17 DIAGNOSIS — R05.3 CHRONIC COUGH: Primary | ICD-10-CM

## 2023-11-17 DIAGNOSIS — J38.7 IRRITABLE LARYNX SYNDROME: ICD-10-CM

## 2023-11-17 DIAGNOSIS — J38.7 LARYNGEAL HYPERFUNCTION: ICD-10-CM

## 2023-11-17 DIAGNOSIS — R49.0 DYSPHONIA: ICD-10-CM

## 2023-11-17 PROCEDURE — 92524 BEHAVRAL QUALIT ANALYS VOICE: CPT | Mod: GN | Performed by: SPEECH-LANGUAGE PATHOLOGIST

## 2023-11-17 PROCEDURE — 92507 TX SP LANG VOICE COMM INDIV: CPT | Mod: GN | Performed by: SPEECH-LANGUAGE PATHOLOGIST

## 2023-11-17 PROCEDURE — 31579 LARYNGOSCOPY TELESCOPIC: CPT | Mod: 52 | Performed by: SPEECH-LANGUAGE PATHOLOGIST

## 2023-11-17 NOTE — LETTER
11/17/2023       RE: Aris Colon  [address] [place] [id number]     Dear Colleague,    Thank you for referring your patient, Aris Colon, to the [facility] at [facility]. Please see a copy of my visit note below.    [facility] VOICE [facility]    Patient: Aris Colon  Date of Visit: 11/17/2023    CHIEF COMPLAINT: Cough    HISTORY  Aris Colon is a 53 year old year old gentleman, who was seen for follow-up evaluation     Initial impressions by author Milagros Landa (voice), MNikolaiSNikolai, CCC-SLP from 3/23/2023:   Aris Colon is presenting today with Chronic Cough (R05.3) and Dysphonia (R49.0) in the context of Irritable Larynx Syndrome (ILS) (J38.7) and Laryngeal Hyperfunction (J38.7). Laryngoscopy revealed mild vocal fold redness and swelling bilaterally, mild redness and swelling of the posterior cricoid region and arytenoids, and moderate supraglottic hyperfunction. He was highly stimulable for reduced laryngeal hyper-reactivity through use of abductory cough avoidance tasks, specifically the rapid sniff and sh sh sh sh sh maneuver. He perceptually demonstrated mildly rough and mild-moderately strained voice quality, lower than normal FO (97Hz), non optimal respiratory mechanics, thyrohyoid space closure upon phonation, and near constant chronic coughing which appears non productive and consistent with upper airway hypersensitivity. He would benefit from a course of speech therapy targeting reduced laryngeal irritation (LPR, chronic cough and throat clearing), improved respiratory mechanics, reduced perilaryngeal hyperfunction, and phonatory patterns targeting reduced laryngeal hyperfunction.     INTERVAL HISTORY:    Dr. De Paz made numerous recommendations following initial evaluation including behavioral health, pulmonology, and x-ray swallow study with esophagram.  After  swallow rounds it was determined that swallow was safe and no swallowing specific intervention was recommended.  Further work with voice therapy to address laryngeal pharyngeal hypersensitivity was recommended.  Patient presents to initiate this formally today with time allotted for reevaluation should symptoms have altered in the interim.    11/17/2023 - author: Dean Dutta - Today he reports that he just got done having an episode where he was coughing to the point of emesis and hemoptysis. Feels like his throat is crushing when he has the symptoms and heaviness in his chest. Mentions that he was told to try and suppress the cough, but finds that he is unable to remember to do so.  Had a recent exacerbation symptoms with coughing to the point of emesis and hemoptysis.  He was seen at [facility] in [place] through the emergency department.  Evaluation that day demonstrated normal CXR, with no other visualization completed per patient report (records are not visible at the time of this appointment) Currently on a 10 day course prednisone, no benefit.  Still coughing some blood, but not as much at this point.      Although foundational names remain consistent with prior evaluation, recent change in status and exacerbation warrant repeat evaluation to rule out structural change that may alter plan of care.    OBJECTIVE    PERCEPTUAL EVALUATION (CPT 48152)  POSTURE / TENSION:   neck and shoulders  Greatly increased visible tension in neck shoulders jaw and face during the lead up to coughing    BREATHING:   shallow  phonation is not coordinated with respiration    VOICE:  Roughness: Moderate Consistent  Breathiness: Moderate Consistent  Strain: Mild to moderate Consistent  Loudness  Conversational speech:  Mild to moderately reduced  Projected speech:  not assessed to avoid exacerbation of cough  Pitch:  Conversational speech:  WNL  Pitch glide:   Within functional limits  Resonance:  Conversational speech:   laryngeal pharyngeal resonance  Singing vs. Speech: Consistent across contexts    COUGH/THROAT CLEARING:  Patient presents with frequent bouts of extremely heavy dry coughing   Even with the most aggressive cough it is minimally productive at best  Smaller throat clearing and cough noted intermittently  Significant decrease in the frequency of cough was seen after laryngeal evaluation which included administration of topical nasal lidocaine    LARYNGEAL EXAMINATION (CPT 40130)  Procedure: Flexible endoscopy with chip-tip technology without stroboscopy, left nostril; topical anesthesia with 3% Lidocaine and 0.25% phenylephrine was applied.   Performed by:  Dean Dutta CCC-SLP  The laryngeal and pharyngeal structures were evaluated for gross appearance, mobility, function, and focal lesions / abnormalities of the associated mucosa.    All findings were within normal limits with the exception of the following salient features:   Mild to Moderate cobblestoning of the posterior pharyngeal wall, with bumpy full appearance of the pharynx overall  Vocal folds have bilateral fullness and subtle irregularity of mucosal appearance without focal lesion or hemorrhage  No visible sign of bleeding or ulceration is seen in the larynx, pharynx, or proximal trachea  With phonatory tasks there is intermittent moderate to severe four-way constrictive supraglottic hyperfunction, though this is notably seen to decrease with higher flow and lower intensity  With repetitive tasks brisk motion is seen bilaterally, with slightly greater range of motion on left than right, though adequate laryngeal airway is able to be achieved across the exam  Patient was noted to be able to arrest cough during the exam with focus on intentional relaxation  Rescue breathing strategies were effective in abduction of the vocal folds with minimal support      Plain light and narrowband imaging of mucosal status      Supraglottic hyperfunction during running  speech    The laryngeal exam was reviewed with Mr. Colon, and I provided pertinent explanations, as well as written and oral information.    ASSESSMENT / PLAN  IMPRESSIONS: Derrick Colon presents today with ongoing chronic cough which began in conjunction with workplace particle exposure, and with recent exacerbation including hemoptysis and emesis.  Laryngeal evaluation today was warranted given recent episodes of hemoptysis to ensure the patient was not recovering from hemorrhagic or ulcerative event.  This demonstrated no acute trauma which is fortunate, though diffuse change to the vocal folds and surrounding tissues from longstanding cough was seen consistent with previous evaluation if slightly worsened.  Cough response was aggressive to the application of oxymetazoline nasal spray, and guarding and light cough was noted to occur even prior to the administration, which underscores both the hypersensitivity as well as the stress base response to triggers that has developed over time.  Within the exam itself patient was able to attenuate cough through focused relaxation which bodes well for his ability to incorporate therapeutic strategies to reduce cough, though as we discussed full resolution of symptoms is often challenging due to their multifactorial nature especially in the case of irritant exposure.    RECOMMENDATIONS:   Ongoing medically necessary speech therapy continues to be warranted help reduce impactful patterns of laryngeal activation which contribute to ongoing chronic cough and laryngeal pharyngeal hypersensitivity  Continue with current plan of care  For session accomplished on the same day as today's visit please see associated note below    This treatment plan was developed with the patient who agreed with the recommendations.    _______________________________________________________________________  THERAPY NOTE (CPT 27016)  Date of Service: 11/17/2023    SUBJECTIVE /  OBJECTIVE:  Please refer to my evaluation report from today's encounter for full details regarding subjective data, patient reported measures, and diagnostic findings.    THERAPEUTIC ACTIVITIES  Counseling and Education  Asked many questions about the nature of his symptoms, and I answered all of these thoroughly.    Instructed concepts and techniques for optimal vocal hygiene including:  Systemic hydration, including strategies for increasing daily water intake  Currently ~40 ounces  Recommended 64 ounces  Topical hydration - Gargling, humidification  Steam is a known trigger for the patient and discussion of adequate but not excessive humidity as well as had in conjunction with this  Environmental barriers to healthy voicing - noise, inhaled irritants, room acoustics  Management of laryngopharyngeal reflux disease (LPRD)  Avoiding eating or drinking within 2-3 hours of bedtime  Avoiding eating and drinking immediately prior to rigorous activity  Proper timing and use of acid reflux medication discussed in general context with recommendation of follow-up with pharmacist for detailed instructions    Chronic cough / throat clearing reduction therapy  Suppression and substitution strategies were instructed including  Swallowing substitution techniques  Breathing suppression techniques to reduce laryngeal tension  Low impact glottic coup and soft cough  Techniques to raise awareness of habitual throat clearing  Additionally he was instructed to keep a log of what circumstances are eliciting cough / throat clear    Concepts of an optimal regimen for practice were instructed.  He should use an interval schedule of practice, with brief periods of practice frequently throughout each day  [place] concepts of volitional practice to facilitate motor learning.  I provided handouts of today's therapeutic activities to facilitate practice.    ASSESSMENT/PLAN  PROGRESS TOWARD LONG TERM GOALS:   Minimal at this point, as this is  first session, but good learning today    IMPRESSIONS: Chronic cough (R05.3) and dysphonia (R49.0) in the context of irritable larynx syndrome (J38.7) following workplace exposure to airborne irritants.  Derrick is very motivated for improvement, and reported good understanding of therapeutic rationales.  He was able to demonstrate techniques with adequate accuracy, and I feel confident he will work to apply them at home.    PLAN: Derrick is currently scheduled to be seen by my colleague Ora Shepard, CCC-SLP in late December 2023, but will be seen sooner by myself or sooner as schedule permits.      TOTAL SERVICE TIME: 100 minutes  EVALUATION OF VOICE AND RESONANCE (13381)  TREATMENT (19125)  ENDOSCOPIC LARYNGEAL EXAMINATION WITHOUT STROBOSCOPY (99405)  NO CHARGE FACILITY FEE (16861)    Makr Dutta M.M., M.A., CCC-SLP  Speech-Language Pathologist  Certificate of Vocology  [phone]    *this report was created in part through the use of computerized dictation software, and though reviewed following completion, some typographic errors may persist.  If there is confusion regarding any of this notes contents, please contact me for clarification.*      Again, thank you for allowing me to participate in the care of your patient.      Sincerely,    Mark Dutta, SLP

## 2023-11-17 NOTE — PATIENT INSTRUCTIONS
Renata Meza, it was a pleasure to meet you today below are the exercises that we discussed.  Do your best to be as diligent as you are able to be, and do not give yourself a hard time!  Reach out with any questions you may have!    -Dean      Vocal Hygiene - Taking Good Care of Your Larynx     You have been given this brochure because you have some kind of swelling or lesion on your vocal folds that you would like to resolve, or you are going to undergo a surgery on your vocal folds. We will help you train your voice to be adequate for your vocal needs.  However, this brochure tells you ways you can optimize the way you care for your vocal folds before you start voice therapy, and until you are completely healed.    Reduce the extent and impact of your voice use and vocal fold vibration    There are several general things you can do to reduce the impact to your vocal folds as they vibrate.  These include the following:      Reduce the time spent talking.  Reduce the volume of talking   Turn down the radio or TV when you're conversing.  Don't talk to someone who's not in the same room with you.  Avoid talking in the car.   Avoid talking in noisy restaurants, bars, sporting events, etc.   Avoid yelling, screaming, etc.  Avoid throat-clearing and coughing.  Avoid grunting, as in lifting weights.  Avoid making hard, explosive, or rough sounds with your voice.  Avoid whispering and whistling (Surprised? Both are high-impact behaviors.).  Breathe before you talk; stop to take breaths often, and use that air generously when speaking.    All this may seem very limiting, but it's only temporary, until your voice is better.  The goal is for you to be able to do everything you want with your voice.      Maintain a Pleasant Environment for your Vocal Folds    The larynx likes to be in an environment that is moist, cool, and non-toxic.  Therefore:    Don't smoke - anything.  Avoid smoke and other inhalants if they bother you.  Try  to maintain adequate humidity, especially in your bedroom at night.    Maintain Good Hydration    The mucosal covering of the vocal folds must be wet and slippery in order to vibrate optimally.  Therefore, good hydration is a high priority.  There are two kinds of hydration you should maintain: 1) systemic hydration, which is the entire body's internal hydration; and 2) topical, or surface hydration, which keeps the epithelial surface of the vocal folds slippery enough to vibrate.      Systemic Hydration    Drink enough fluids.  The exact amount is different for each individual, depending on metabolic needs (activities, health status, medications), dietary habits (amount of fruits and vegetables or other moist foods), physical activity, and extent of voice use.      A good rule of thumb is that your urine should be clear or very pale throughout the day, and you should not feel the need to clear your throat.  Drink more if your athletic endeavors or extent of voice use demands more.  But be reasonable. Too much water can be unhealthy, and too much in too short a time will not help you, either.    Just remember that it takes time for your body to process the water you drink, so think of systemic hydration as a long-term solution.  While it can be soothing to the back of your throat in the moment if not something that touches your vocal fold directly.  For short-term benefit try topical hydration as outlined below    Topical Hydration    Humidity and Steam - The only forms of topical hydration the truly affect the level of the larynx are humidity and steam.  This can give you a leg up for 30 to 60 minutes, but do not expect the effects to linger for much longer than that.    For you specifically steam does not sound like it would be a great benefit as it frequently makes you cough which is definitely not the goal.  However we also do not want your environment to get so dry that that winds up sending you off.  The use  "of a humidifier for your apartment is a hold aiming to keep things between 30 and 50% humidity is a good plan to just keep you from feeling irritated.  It is likely in the depths of winter even with running and full blast he will not get it beyond 40% and that is okay.  If you find that it set you off back off the level of humidity but I trust you to make that adjustment based on your experience.    Humidity can be especially helpful overnight.  Just remember if you use a room humidifier to always keep it clean and follow the  screening guidelines.    Steam is useful if you are feeling dry, sticky, or especially fatigued in the moment.  You can start with a (free and the cheap) method such as boiling a pot of water and pulling it off the stove, or sitting in the bathroom with the shower on hot.  Breathing through your mouth and gently hum on a comfortable pitch on the way out.  The vibration from gentle humming incorporate condensation into the secretions more effectively    To help the back of your throat, mouth, and base of tongue gargling can also be very helpful, and it is best if you use saline (1/4 teaspoon of kosher salt or purified sea salt, and 1/4 teaspoon of baking soda for 8 ounces of water).  You can do this on and off during your day, and is especially helpful if you find yourself clearing your throat, coughing, or just feel a sensation of irritation/soreness.  You can also use the saline packs in place of the formula above:    It does not need to be this brand if there is a generic alternative by all means get the cheapest one that you can    When you gargle I want you to make sure that you are moving your head around, moving your jaw around, and moving her tongue around, I want you to and with a \"kuh kuh kuh\" sound  Again do this on and off throughout your day you really cannot do too much      Lozenges can be helpful for stimulating saliva and helping you feel more lubricated, but be " "sure to avoid anything that has menthol as is active ingredient as this can have a long-term effect of drying out the tissues of her throat.  95% of cough drops in the world have menthol after active ingredient even if it is not the labeled flavor.  The only way to know is to check the back of the package for active ingredients.  The most common nonmenthol-based cough drops are [place] wild [place], [employer], and a subbrand of [place] called [place] \"Breezers\", but to be honest hard candy works just fine.    A couple other key reminders for you     With regards to your voice, keep using it in an easy comfortable way.  That does not mean that it needs to be quiet necessarily, but it should feel low effort and not irritating to your throat as you do so, and that likely comes along with a gentle conversational type of voice use.    With regards to other things that can irritate your throat such as reflux, you have already tried the proton pump inhibitor omeprazole.  I would just encourage you to do some basic general healthy habits such as avoiding eating and drinking within a couple of hours of going to bed, or prior to rigorous activity.  Make sure if you are taking those medications you are taking them on an empty stomach 30 to 60 minutes before you eat anything to get the best effect.  I do think if you are encouraged to do so that you should keep up with those things until we get the cough under better control simply because I want to make sure hitting things on all fronts is much as possible and not winding up playing whack a mole.    ___________________________________________________________________________________________________________  Chronic Cough Tools and Strategies  Raising Awareness & Calming Things Down    Vocal Hygiene - This helps set the stage for success.  Details are available on other handouts, but in short:  Stay HYDRATED drink lots of water.  Gargle saline multiple times a day to soothe the " tissues!  Pay attention to how (and how MUCH) you use your voice  Avoid inhaled irritants  Reflux management  Stay Healthy  Suppress & Substitute - Practice these techniques 2 times per day like a fire drill until you know them like the back of your hand.   Anytime you feel an itchy throat sensation and the urge to cough or clear your throat try any or all of them.    Sip of water and swallow (hot, cold, carbonated)  Hard Swallow (tip your chin down and swallow hard!)  Lozenges (avoid menthol), Gum, Hard Candy (anything that stimulates saliva)  Gargle  Gentle Sniff or pursed lip inhale and exhale on  Shhh  like shushing a baby or like blowing at a candle  You can also breathe in around your tongue (with the tip near that ridge behind your teeth)  You can add a swallow prior to the exhalation cycle  Remember this is not about oxygen exchange, so it does not need to be a large breath  Sniff or pursed lip inhale and exhale on  zzz  like a buzzing bee or an  mmm  sound  Light glottic coup (gentle throat clear like you are saying  eh eh eh )  Soft cough (hairball maneuver AKA big capital H) then  swallow  Good old fashiond distraction! Count to 15 silently, say the alphabet backwards silently, etc.     Build Awareness - You can't beat it unless you know that it's happening.  Keep a log of what actions, environments, or times of day seem to cause the biggest problem.   Enlist a spouse, family member, friend, or co-worker to help you recognize when you are coughing and clearing.  It is easy for coughing and clearing to become habitual, to the extent that you may not be aware of the fact that you are doing it.    Use visual and tactile reminders to encourage use of suppression and substitution strategies.  Putting a sticky note in an obvious position on your computer monitor, and wearing your watch on the other wrist are examples.  If any of the strategies above genuinely set you off set them aside immediately.  If you are  on the fence about them though I want you to keep trying them over the course of a week or so to see if they are helpful in some scenarios versus others.  At the end of the week you are good to know which strategies are helpful and just utilize those.    REMEMBER avoiding even one Cough in 25 is a great step!    One of the biggest challenges is getting this to happen at all.  What I would recommend for you is start off by practicing these exercises like a fire drill even when you do not need to cough just to remind yourself and feel really confident you know where the exit of the building is just in case things catch on fire.  Beyond that I think it is actually helpful to set up.  Of time and you can put a timer on her phone during which are going to focus and try to intercede.  The first day back to be 5 minutes twice a day right where your skin was sent there and think about what you feel and if you feel the cough coming or going to try the strategies.  Over time you can expand how much time you are doing that or hopefully it starts to feel more natural to incorporate those strategies.  Thinking about trying to apply it universally every time throughout an entire day may feel daunting or on the insurmountable and in that case focus on the 5 minutes at a time strategy.

## 2023-12-11 ENCOUNTER — DOCUMENTATION ONLY (OUTPATIENT)
Dept: SLEEP MEDICINE | Facility: CLINIC | Age: 53
End: 2023-12-11

## 2023-12-11 ENCOUNTER — THERAPY VISIT (OUTPATIENT)
Dept: SLEEP MEDICINE | Facility: CLINIC | Age: 53
End: 2023-12-11
Payer: COMMERCIAL

## 2023-12-11 DIAGNOSIS — G47.33 OSA (OBSTRUCTIVE SLEEP APNEA): Primary | ICD-10-CM

## 2023-12-11 DIAGNOSIS — G47.9 SLEEP DISTURBANCE: ICD-10-CM

## 2023-12-11 PROCEDURE — 95810 POLYSOM 6/> YRS 4/> PARAM: CPT | Performed by: INTERNAL MEDICINE

## 2023-12-14 ENCOUNTER — PRE VISIT (OUTPATIENT)
Dept: OTOLARYNGOLOGY | Facility: CLINIC | Age: 53
End: 2023-12-14
Payer: COMMERCIAL

## 2023-12-14 NOTE — PROCEDURES
"         SLEEP STUDY INTERPRETATION  DIAGNOSTIC POLYSOMNOGRAPHY REPORT      Patient: GIL PETERSON  YOB: 1970  Study Date: 12/11/2023  MRN: [id number]  Referring Provider: -  Ordering Provider: MD Alfredito Crawford    Indications for Polysomnography: The patient is a 53-year-old Male who is 5' 10\" and weighs 205.0 lbs. His BMI is 29.7, [place] sleepiness scale 7 and neck circumference is 44 cm. Patient reports snoring and occasional episodes of awakenings due to gasping for air and choking. Medical history includes exposure to overwhelming amounts of dusts in the workplace, with probable RADS leading to chronic refractory cough leading to supraglottic hyperfunction and laryngeal hyperreactivity. A diagnostic polysomnogram was performed to evaluate for sleep apnea /hypoxemia.    Polysomnogram Data: A full night polysomnogram recorded the standard physiologic parameters including EEG, EOG, EMG, ECG, nasal and oral airflow. Respiratory parameters of chest and abdominal movements were recorded with respiratory inductance plethysmography. Oxygen saturation was recorded by pulse oximetry. Hypopnea scoring rule used: 1B 4%.    Sleep Architecture: Sleep architecture was remarkable for sleep fragmentation (due to respiratory events, periodic limb movements, and some spontaneous arousals) and increased wake after sleep onset with reduced sleep efficiency.  All sleep stages were seen.  The total recording time of the polysomnogram was 673.0 minutes. The total sleep time was 422.0 minutes. Sleep latency was decreased at 7.5 minutes without the use of a sleep aid. REM latency was 274.5 minutes. Arousal index was increased at 65.5 arousals per hour. Sleep efficiency was decreased at 62.7%. Wake after sleep onset was 243.5 minutes. The patient spent 11.6% of total sleep time in Stage N1, 59.6% in Stage N2, 7.9% in Stage N3, and 20.9% in REM. Time in REM supine was 0.5 " minutes.    Respiration: Moderate obstructive sleep apnea was present with sleep associated hypoxemia. Both obstructive and central apneas were noted (mostly obstructive apneas). The disordered breathing events were observed during both supine and non-supine positions and were pronounced during supine sleep. Since REM sleep in supine position was significantly reduced (0.5 minutes), it is plausible that the overall severity of the sleep related breathing disorder may have been underestimated.  Events ? The polysomnogram revealed a presence of 19 obstructive, 28 centrals, and - mixed apneas resulting in an apnea index of 6.7 events per hour. There were 125 obstructive hypopneas and - central hypopneas resulting in an obstructive hypopnea index of 17.8 and central hypopnea index of - events per hour. The combined apnea/hypopnea index was 24.5 events per hour (central apnea/hypopnea index was 4.0 events per hour). The REM AHI was 1.4 events per hour. The supine AHI was 57.3 events per hour. The RERA index was 7.0 events per hour.  The RDI was 31.4 events per hour.  Snoring - was reported as loud and intermittent.  Respiratory rate and pattern - was notable for normal respiratory rate with intermittent periodicity in breathing pattern.  Sustained Sleep Associated Hypoventilation - Transcutaneous carbon dioxide monitoring was not used.  Sleep Associated Hypoxemia - (Greater than 5 minutes O2 sat at or below 88%) was present. Baseline oxygen saturation was 94.6%. Lowest oxygen saturation was 81.0%. Time spent less than or equal to 88% was 5.2 minutes. Time spent less than or equal to 89% was 7.9 minutes.    Movement Activity: Frequent periodic limb movements (PLMs) were observed during both NREM and REM sleep and some PLMs were associated with arousals.  There were no abnormal sleep-related behaviors during the study.   Periodic Limb Activity - There were 598 PLMs during the entire study. The PLM index was 85.0 movements  per hour. The PLM Arousal Index was 22.5 per hour. PLMs were observed during both NREM and REM sleep.  REM EMG Activity - Excessive transient/sustained muscle activity was present due to PLMs.  Nocturnal Behavior - Abnormal sleep related behaviors were not noted during/arising out of NREM / REM sleep.   Bruxism - Not apparent.    Cardiac Summary: Normal sinus rhythm was noted with occasional premature ventricular complexes (PVCs).  The average pulse rate was 62.1 bpm. The minimum pulse rate was 49.0 bpm while the maximum pulse rate was 92.0 bpm.  Occasional premature ventricular complexes were noted.    Assessment:   Sleep architecture was remarkable for sleep fragmentation (due to respiratory events, periodic limb movements, and some spontaneous arousals) and increased wake after sleep onset with reduced sleep efficiency.  All sleep stages were seen.  Moderate obstructive sleep apnea was present with sleep associated hypoxemia. Both obstructive and central apneas were noted (mostly obstructive apneas). The disordered breathing events were observed during both supine and non-supine positions and were pronounced during supine sleep. Since REM sleep in supine position was significantly reduced (0.5 minutes), it is plausible that the overall severity of the sleep related breathing disorder may have been underestimated.  Frequent PLMs were observed during both NREM and REM sleep and some PLMs were associated with arousals.    There were no abnormal sleep-related behaviors during the study.   Normal sinus rhythm was noted with occasional PVCs.      Recommendations:  Based on the presence of moderate obstructive sleep apnea, treatment could be empirically initiated with Auto titrating CPAP therapy with a range of 5 to 15 cmH2O. Recommend clinical follow up with sleep management team.  Patient may be a candidate for combination treatment using positional device during sleep along with dental appliance through referral to  Sleep Dentistry for the treatment of obstructive sleep apnea and/or socially disruptive snoring.  Pharmacologic therapy should be used for management of restless legs syndrome only if present and clinically indicated and not based on the presence of periodic limb movements alone.  Advice regarding the risks of drowsy driving.  Suggest optimizing sleep schedule and avoiding sleep deprivation.  Weight management (if BMI > 30).    Diagnostic Codes:   Obstructive Sleep Apnea G47.33  Sleep Hypoxemia/Hypoventilation G47.36   Periodic Limb Movement Disorder G47.61  Repetitive Intrusions Into Sleep F51.8       12/11/2023 [facility] Study (205.0 lbs) - AHI 24.5, RDI 31.4, Supine AHI 57.3, REM AHI 1.4, Low O2 81.0%, Time Spent ?88% 5.2 minutes / Time Spent ?89% 7.9 minutes.     _____________________________________   Electronically Signed By: (Alfredito Crawford MD), 12/13/23

## 2023-12-15 LAB — SLPCOMP: NORMAL

## 2023-12-29 ENCOUNTER — VIRTUAL VISIT (OUTPATIENT)
Dept: OTOLARYNGOLOGY | Facility: CLINIC | Age: 53
End: 2023-12-29
Payer: OTHER MISCELLANEOUS

## 2023-12-29 DIAGNOSIS — J38.7 IRRITABLE LARYNX SYNDROME: ICD-10-CM

## 2023-12-29 DIAGNOSIS — R05.3 CHRONIC COUGH: Primary | ICD-10-CM

## 2023-12-29 PROCEDURE — 92507 TX SP LANG VOICE COMM INDIV: CPT | Mod: GN | Performed by: SPEECH-LANGUAGE PATHOLOGIST

## 2023-12-29 NOTE — LETTER
12/29/2023       RE: Aris Colon  [address] [place] [id number]     Dear Colleague,    Thank you for referring your patient, Aris Colon, to the [facility] VOICE CLINIC [place] [facility]. Please see a copy of my visit note below.    Derrick Colon is a 53 year old male who is being evaluated via a billable video visit.      Derrick has been notified and verbally consented to the following:   This video visit will be conducted between you and your provider.  Patient has opted to conduct today's video visit vs an in-person appointment.   Video visits are billed at different rates depending on your insurance coverage. Please reach out to your insurance provider with any questions.   If during the course of the call the provider feels the appointment is not appropriate, you will not be charged for this service.  Provider has received verbal consent for billable virtual visit from the patient? Yes  Will anyone else be joining your video visit? No    Call initiated at: 1000   Type of Visit Platform Used: [employer] Video  Location of provider: Home  Location of patient: [facility] VOICE CLINIC  PROGRESS REPORT (CPT [id number])    Patient: Aris Colon  Date of Service: 12/29/2023  Date of Last Service: 11/17/23  Number of Visits: 2  Referring Physician: Dr. Kathy De Paz    Impressions from evaluation on 11/17/23 by aMrk Dutta M.S., CCC-SLP:  Derrick Colon presents today with ongoing chronic cough which began in conjunction with workplace particle exposure, and with recent exacerbation including hemoptysis and emesis.  Laryngeal evaluation today was warranted given recent episodes of hemoptysis to ensure the patient was not recovering from hemorrhagic or ulcerative event.  This demonstrated no acute trauma which is fortunate, though diffuse change to the vocal folds and surrounding tissues from  longstanding cough was seen consistent with previous evaluation if slightly worsened.  Cough response was aggressive to the application of oxymetazoline nasal spray, and guarding and light cough was noted to occur even prior to the administration, which underscores both the hypersensitivity as well as the stress base response to triggers that has developed over time.  Within the exam itself patient was able to attenuate cough through focused relaxation which bodes well for his ability to incorporate therapeutic strategies to reduce cough, though as we discussed full resolution of symptoms is often challenging due to their multifactorial nature especially in the case of irritant exposure.      SUBJECTIVE:  Since Derrick's last session, he reports the following:   Overall he has not had any change in his symptoms, which is expected, as he has only participated in an evaluation thus far and no therapeutic suggestions were made at the time.  The only thing that seems to help reduce his coughing is if he doesn't talk.   He tried gargling baking soda and salt, but this triggers more coughing.   He reports being very tired physically and mentally after two years of coughing.    OBJECTIVE:  Patient Specific Goal Metrics:      12/29/2023    10:00 AM   Dysponia SLP Goals   How severe is your cough /throat clearing, if 0 is no cough at all and 10 is the worst cough? 10   How much does your cough/throat-clearing problem bother you?            Very Much       THERAPEUTIC ACTIVITIES  Today Derrick participated in the following therapeutic activities:  Counseling and Education:  Asked questions about the nature of his symptoms, and I answered all of these thoroughly.  Therapy protocol and rationale, including plan for today's session and future sessions  Education of laryngeal anatomy and physiology  Information regarding the complex interactions between laryngeal function and self-perpetuating cycles of irritation and  hyper-reactivity with application to his own reported symptoms and patterns of laryngeal impairment  I provided Derrick with explanation and skilled instruction of:  Exercises to coordinate phonation with optimal flowing airstream and reduce phonatory impact.   Semi-occluded vocal tract exercises with pursed lips, streamer, and ballooned cheeks were most facilitating  He progressed through the warm-up level of phonatory complexity  Instructed to use as a voice warm up, cool down, coordination of breath flow with phonation, and for tissue mobilization.  Specific modifications instructed included focus on air flow only  Patient demonstrated good learning, but will need practice and additional reinforcement    Instruction of Home Practice:  I instructed Derrick in the concepts of an optimal practice regimen, including use of an interval schedule with brief periods of practice frequently throughout each day, and concepts of volitional practice to facilitate motor learning.  I emphasized the therapeutic rational and provided an After Visit Summary through [employer] to reinforce today's therapeutic activities and facilitate home practice.    ASSESSMENT/PLAN  Progress toward long-term goals:  Minimal at this point, as this is first session, but good learning today    Impressions:  IMPRESSIONS: chronic cough  Derrick had a productive session of therapy today, working on learning about ILS and his symptoms, introduction to cough stopper technique, and introduction to SOVT tasks. He will continue to work on his exercises on a daily basis, and work on incorporating the techniques into his daily activities. Speech therapy continues to be medically necessary for Derrick to participate fully and engage in activities of daily living.     Plan:   I will see Derrick in 1.5 weeks and will plan to reinforce the nature of ILS and persistent progress. Instruct chest release and respiratory mechanics, including melanie, and apply to  streamer/ballooned cheeks.   For practice goals, see AVS.     TOTAL SERVICE TIME: 75 minutes  TREATMENT ([id number])    Milagros Landa (voice) MNikolaiS., CCC-SLP  Speech-Language Pathologist  [facility] Voice [facility]  [phone]  [email or web address]  Pronouns: she/her/hers      *this report was created in part through the use of computerized dictation software, and though reviewed following completion, some typographic errors may persist.  If there is confusion regarding any of this notes contents, please contact me for clarification    OBJECTIVE FINDINGS  PATIENT REPORTED MEASURES  Patient Supplied Answers To Last 2 VHI Questionnaires       No data to display                   Patient Supplied Answers To Last 2 CSI Questionnaires       No data to display                   Patient Supplied Answers To Last 2 EAT Questionnaires       No data to display                  Patient Supplied Answers to Dyspnea Index Questionnaire:       No data to display                   Again, thank you for allowing me to participate in the care of your patient.      Sincerely,    Ora Shepard, SLP

## 2023-12-29 NOTE — PROGRESS NOTES
Derrick Colon is a 53 year old male who is being evaluated via a billable video visit.      Derrick has been notified and verbally consented to the following:   This video visit will be conducted between you and your provider.  Patient has opted to conduct today's video visit vs an in-person appointment.   Video visits are billed at different rates depending on your insurance coverage. Please reach out to your insurance provider with any questions.   If during the course of the call the provider feels the appointment is not appropriate, you will not be charged for this service.  Provider has received verbal consent for billable virtual visit from the patient? Yes  Will anyone else be joining your video visit? No    Call initiated at: 1000   Type of Visit Platform Used: [employer] Video  Location of provider: Home  Location of patient: [facility] VOICE CLINIC  PROGRESS REPORT (CPT 77836)    Patient: Aris Colon  Date of Service: 12/29/2023  Date of Last Service: 11/17/23  Number of Visits: 2  Referring Physician: Dr. Kathy De Paz    Impressions from evaluation on 11/17/23 by Mark Dutta M.S., CCC-SLP:  Derrick Colon presents today with ongoing chronic cough which began in conjunction with workplace particle exposure, and with recent exacerbation including hemoptysis and emesis.  Laryngeal evaluation today was warranted given recent episodes of hemoptysis to ensure the patient was not recovering from hemorrhagic or ulcerative event.  This demonstrated no acute trauma which is fortunate, though diffuse change to the vocal folds and surrounding tissues from longstanding cough was seen consistent with previous evaluation if slightly worsened.  Cough response was aggressive to the application of oxymetazoline nasal spray, and guarding and light cough was noted to occur even prior to the administration, which underscores both the hypersensitivity as well as the stress base response to triggers that  has developed over time.  Within the exam itself patient was able to attenuate cough through focused relaxation which bodes well for his ability to incorporate therapeutic strategies to reduce cough, though as we discussed full resolution of symptoms is often challenging due to their multifactorial nature especially in the case of irritant exposure.      SUBJECTIVE:  Since Derrick's last session, he reports the following:   Overall he has not had any change in his symptoms, which is expected, as he has only participated in an evaluation thus far and no therapeutic suggestions were made at the time.  The only thing that seems to help reduce his coughing is if he doesn't talk.   He tried gargling baking soda and salt, but this triggers more coughing.   He reports being very tired physically and mentally after two years of coughing.    OBJECTIVE:  Patient Specific Goal Metrics:      12/29/2023    10:00 AM   Dysponia SLP Goals   How severe is your cough /throat clearing, if 0 is no cough at all and 10 is the worst cough? 10   How much does your cough/throat-clearing problem bother you?            Very Much       THERAPEUTIC ACTIVITIES  Today Derrick participated in the following therapeutic activities:  Counseling and Education:  Asked questions about the nature of his symptoms, and I answered all of these thoroughly.  Therapy protocol and rationale, including plan for today's session and future sessions  Education of laryngeal anatomy and physiology  Information regarding the complex interactions between laryngeal function and self-perpetuating cycles of irritation and hyper-reactivity with application to his own reported symptoms and patterns of laryngeal impairment  I provided Derrick with explanation and skilled instruction of:  Exercises to coordinate phonation with optimal flowing airstream and reduce phonatory impact.   Semi-occluded vocal tract exercises with pursed lips, streamer, and ballooned cheeks were most  facilitating  He progressed through the warm-up level of phonatory complexity  Instructed to use as a voice warm up, cool down, coordination of breath flow with phonation, and for tissue mobilization.  Specific modifications instructed included focus on air flow only  Patient demonstrated good learning, but will need practice and additional reinforcement    Instruction of Home Practice:  I instructed Derrick in the concepts of an optimal practice regimen, including use of an interval schedule with brief periods of practice frequently throughout each day, and concepts of volitional practice to facilitate motor learning.  I emphasized the therapeutic rational and provided an After Visit Summary through [facility] to reinforce today's therapeutic activities and facilitate home practice.    ASSESSMENT/PLAN  Progress toward long-term goals:  Minimal at this point, as this is first session, but good learning today    Impressions:  IMPRESSIONS: chronic cough  Derrick had a productive session of therapy today, working on learning about ILS and his symptoms, introduction to cough stopper technique, and introduction to SOVT tasks. He will continue to work on his exercises on a daily basis, and work on incorporating the techniques into his daily activities. Speech therapy continues to be medically necessary for Derrick to participate fully and engage in activities of daily living.     Plan:   I will see Derrick in 1.5 weeks and will plan to reinforce the nature of ILS and persistent progress. Instruct chest release and respiratory mechanics, including melanie, and apply to streamer/ballooned cheeks.   For practice goals, see AVS.     TOTAL SERVICE TIME: 75 minutes  TREATMENT ([id number])    Milagros Landa (voice), M.S., CCC-SLP  Speech-Language Pathologist  [facility]  [phone]  [email or web address]  Pronouns: she/her/hers      *this report was created in part through the use of computerized dictation software, and  though reviewed following completion, some typographic errors may persist.  If there is confusion regarding any of this notes contents, please contact me for clarification    OBJECTIVE FINDINGS  PATIENT REPORTED MEASURES  Patient Supplied Answers To Last 2 VHI Questionnaires       No data to display                   Patient Supplied Answers To Last 2 CSI Questionnaires       No data to display                   Patient Supplied Answers To Last 2 EAT Questionnaires       No data to display                  Patient Supplied Answers to Dyspnea Index Questionnaire:       No data to display

## 2023-12-29 NOTE — PATIENT INSTRUCTIONS
"Hello!    It was a pleasure to see you today. Please complete the exercises below and remember, a few minutes of practice many times throughout the day is more important than one large practice session. If you have any questions about your strategies, feel free to contact me at [email or web address] or via [email or web address]. Please call [phone] for scheduling alterations or to be seen sooner in case of cancellations.     Home Exercise Program:  VOICE EXERCISES (1x/hour, when you can)  -- Inhale SILENTLY and feel your belly fill with air  -- Slowly exhale as your belly contracts, ROUND \"OO\" LIPS  -- Hold your streamer about 6 inches from your lips  -- CHEEKS lightly PUFFED  -- Think of saying \"Whoooooo\" and watch for a smooth, gentle streamer movement, like a flag blowing in a breeze  3-5x silently with just air for 5 seconds  3-5x easy sigh on \"whooo\"  3-5x foghorn on single pitch for 5 seconds  3-5x lower for 5 seconds (tugboat)  3-5x higher for 5 seconds (flutey and breathy)  3-5x sliding up and down like sirens  -- Double-check that your CHEEKS ARE PUFFED and STREAMER IS BLOWING GENTLY       COUGH STOPPER  1. Press your fist against your lips  2. Aim to balloon your cheeks and throat as you cough  3. ONLY breathe back in through your nose- inhaling through your mouth triggers more coughing  4. Keep repeating the balloon and sniff, balloon and sniff, until the spasm stops.    If you are willing, teach this to friends and family so that when you start coughing, someone next to you can say \"Make a fist! Balloon! Balloon!\"  OR put up post-it note reminders for yourself      Thank you and have a great day!  Milagros Marmolejo (voice), M.S., CCC-SLP  Speech-Language Pathologist  [facility] Voice [facility]  [phone]  [email or web address]  Pronouns: she/her/hers     "

## 2024-01-08 ENCOUNTER — VIRTUAL VISIT (OUTPATIENT)
Dept: OTOLARYNGOLOGY | Facility: CLINIC | Age: 54
End: 2024-01-08
Payer: OTHER MISCELLANEOUS

## 2024-01-08 DIAGNOSIS — J38.7 IRRITABLE LARYNX SYNDROME: ICD-10-CM

## 2024-01-08 DIAGNOSIS — R05.3 CHRONIC COUGH: Primary | ICD-10-CM

## 2024-01-08 PROCEDURE — 92507 TX SP LANG VOICE COMM INDIV: CPT | Mod: GN | Performed by: SPEECH-LANGUAGE PATHOLOGIST

## 2024-01-08 NOTE — PROGRESS NOTES
Derrick Colon is a 53 year old male who is being evaluated via a billable video visit.      Derrick has been notified and verbally consented to the following:   This video visit will be conducted between you and your provider.  Patient has opted to conduct today's video visit vs an in-person appointment.   Video visits are billed at different rates depending on your insurance coverage. Please reach out to your insurance provider with any questions.   If during the course of the call the provider feels the appointment is not appropriate, you will not be charged for this service.  Provider has received verbal consent for billable virtual visit from the patient? Yes  Will anyone else be joining your video visit? No    Call initiated at: 1100   Type of Visit Platform Used: [employer] Video  Location of provider: Home  Location of patient: [facility]  PROGRESS REPORT (CPT 33617)    Patient: Aris Colon  Date of Service: 1/8/2024  Date of Last Service: 12/29/23  Number of Visits: 3  Referring Physician: Dr. Kathy De Paz    Impressions from evaluation on 11/17/23 by Mark Dutta M.S., CCC-SLP:  Derrick Colon presents today with ongoing chronic cough which began in conjunction with workplace particle exposure, and with recent exacerbation including hemoptysis and emesis.  Laryngeal evaluation today was warranted given recent episodes of hemoptysis to ensure the patient was not recovering from hemorrhagic or ulcerative event.  This demonstrated no acute trauma which is fortunate, though diffuse change to the vocal folds and surrounding tissues from longstanding cough was seen consistent with previous evaluation if slightly worsened.  Cough response was aggressive to the application of oxymetazoline nasal spray, and guarding and light cough was noted to occur even prior to the administration, which underscores both the hypersensitivity as well as the stress base response to triggers that has  developed over time.  Within the exam itself patient was able to attenuate cough through focused relaxation which bodes well for his ability to incorporate therapeutic strategies to reduce cough, though as we discussed full resolution of symptoms is often challenging due to their multifactorial nature especially in the case of irritant exposure.        SUBJECTIVE:  Since Derrick's last session, he reports the following:   He's tried to do his voice exercises frequently, but hasn't noticed any improvement in his cough.   He tries to use the cough stopper technique, but finds it really hard   He really tired of coughing so much that he throws up and sees blood in it. He's left messages for his doctor    OBJECTIVE:  Patient Specific Goal Metrics:      12/29/2023    10:00 AM   Dysponia SLP Goals   How severe is your cough /throat clearing, if 0 is no cough at all and 10 is the worst cough? 10   How much does your cough/throat-clearing problem bother you?            Very Much       THERAPEUTIC ACTIVITIES  Today Derrick participated in the following therapeutic activities:  Counseling and Education:  Asked questions about the nature of his symptoms, and I answered all of these thoroughly.  Reinforcement of the nature of ILS and his symptoms requiring ongoing use of strategies for gradual improvement.    I provided Derrick with explanation and skilled instruction of:  Education and exercises to promote optimal respiratory mechanics were provided:  Explanation of the anatomy and physiology of respiration for phonation; he found this to be helpful  He demonstrated excessive upper thoracic engagement during inhalation  Difficulty allowing abdominal relaxation for inhalation, and audible inhalation prior to instruction  Targeted practice of optimal breathing mechanics with patient positioned in sitting and a forward leaning position  With clinician support, patient was able to demonstrate improved abdominal relaxation and engagement  "on inhalation  Optimal exhalation using inward engagement of the abdominal wall with no corresponding collapse of the upper chest cavity was trained using the sustained \"sh\" task  Additional education regarding sternal compression and cough trigger at the melanie was discussed  Derrick demonstrated adequate accuracy with moderate clinician support, including verbal explanation and visual demonstration to facilitate awareness of low respiratory engagement.   Application of respiration to SOVT tasks from previous session.     Instruction of Home Practice:  A revised regimen for home practice was instructed.  I provided an AVS of important notes of today's therapeutic activities to facilitate practice.    ASSESSMENT/PLAN  Progress toward long-term goals:  Adequate but incomplete progress; please see above    Impressions:  IMPRESSIONS: chronic cough   Derrick had a productive session of therapy today, working on respiratory mechanics.  He will continue to work on his exercises on a daily basis, and work on incorporating the techniques into his daily activities. Speech therapy continues to be medically necessary for Derrick to participate fully and engage in activities of daily living.     Plan:   I will see Derrick in 4 weeks, sooner if able, and will plan to check on postural adjustments, specifically lifted sternum and abdominal contraction. Provide hands on training for back expansion. Trial manual release. Introduce laryngeal release and combine all with voice tasks, switching from pursed lip balloon SOVT to bubbles as able.    For home practice goals, see AVS.     TOTAL SERVICE TIME: 60 minutes  TREATMENT (30194)    Milagros Landa (voice) MNikolaiS., CCC-SLP  Speech-Language Pathologist  [facility]  [phone]  [email or web address]  Pronouns: she/her/hers      *this report was created in part through the use of computerized dictation software, and though reviewed following completion, some typographic " errors may persist.  If there is confusion regarding any of this notes contents, please contact me for clarification    OBJECTIVE FINDINGS  PATIENT REPORTED MEASURES  Patient Supplied Answers To Last 2 VHI Questionnaires       No data to display                   Patient Supplied Answers To Last 2 CSI Questionnaires       No data to display                   Patient Supplied Answers To Last 2 EAT Questionnaires       No data to display                  Patient Supplied Answers to Dyspnea Index Questionnaire:       No data to display

## 2024-01-08 NOTE — PATIENT INSTRUCTIONS
Hello!     It was a pleasure to see you today. Please complete the exercises below and remember, a few minutes of practice many times throughout the day is more important than one large practice session. If you have any questions about your strategies, feel free to contact me at [email or web address] or via [email or web address]. Please call [phone] for scheduling alterations or to be seen sooner in case of cancellations.      Please obtain a straw or two (regular size & jumbo size)  Place REMINDER NOTES all over your house to 1.lift your sternum, 2. Crunch your belly, 3. Fist balloon!!!!  CHEST UP! GUT PUNCH!!!    Home Exercise Program:  BELLY BREATHING  Stretch: (each morning)  Stretch your arms up and take two slow, deep breaths, feeling your rib cage lift and stretch.    Then lean toward each side, taking 2 slow, deep breaths on each side, feeling your ribcage lift and expand along the sides.   Now leaning back, arching your back to feel a stretch along the front of your ribs, and take 2 more slow, deep breaths.    Lastly, lean forward and allow your back and neck to stretch and take 2 slow, deep breaths.     Practice: (5 breaths per hour or 10 breaths at each meal)  Sit hinged forward with your elbows on your knees. OR Sit normally and place your hands on either side of your belly and back or low ribs  If someone is nearby to help, they can place their hands on the sides of your back and ribs.   Slowly breathe in and feel your belly and back expand, like filling a balloon, pushing out against your waistband  Slowly breathe out on [facility] and feel your belly and back crunch inward, like zipping tight pants  Repeat slowly and take breaks if you get dizzy    HELPFUL HINTS:  -- Tie a string or piece of elastic around your low ribs when you get dressed. You'll be able to feel yourself expand against this as you breathe all day.   -- Some people find it easiest to practice while laying on their back or in a recliner,  "hands on their belly.  -- You can also lay on your stomach with your arms over your head to feel your back expand. It's not unusual for people to struggle noticing their back expanding to start with.     WHEN YOU COUGH:   Keep your sternum lifted  Make the abs crunch up and in, like someone punching you in the gut.   ____________________________________________________    VOICE EXERCISES (1x/hour, when you can)  -- Inhale SILENTLY and feel your belly fill with air  -- Slowly exhale as your belly contracts, ROUND \"OO\" LIPS  -- Hold your streamer about 6 inches from your lips  -- CHEEKS lightly PUFFED  -- Think of saying \"Whoooooo\" and watch for a smooth, gentle streamer movement, like a flag blowing in a breeze  3-5x silently with just air for 5 seconds  3-5x easy sigh on \"whooo\" (double-check your sternum is NOT helping)  3-5x foghorn on single pitch for 5 seconds  3-5x lower for 5 seconds (tugboat)  3-5x higher for 5 seconds (flutey and breathy)  3-5x sliding up and down like sirens  -- Double-check that your CHEEKS ARE PUFFED and STREAMER IS BLOWING GENTLY         COUGH STOPPER  1. Press your fist against your lips  2. Aim to balloon your cheeks and throat as you cough  3. ONLY breathe back in through your nose- inhaling through your mouth triggers more coughing  4. Keep repeating the balloon and sniff, balloon and sniff, until the spasm stops.     If you are willing, teach this to friends and family so that when you start coughing, someone next to you can say \"Make a fist! Balloon! Balloon!\"  OR put up post-it note reminders for yourself        Thank you and have a great day!  Rosendo Marmolejo. (voice) M.S., CCC-SLP  Speech-Language Pathologist  [facility]  [phone]  [email or web address]  Pronouns: she/her/hers  "

## 2024-01-08 NOTE — LETTER
1/8/2024       RE: Aris Colon  [address] [place] [id number]     Dear Colleague,    Thank you for referring your patient, Aris Colon, to the [facility]ICE [facility] [place] [facility]. Please see a copy of my visit note below.    Derrick Colon is a 53 year old male who is being evaluated via a billable video visit.      Derrick has been notified and verbally consented to the following:   This video visit will be conducted between you and your provider.  Patient has opted to conduct today's video visit vs an in-person appointment.   Video visits are billed at different rates depending on your insurance coverage. Please reach out to your insurance provider with any questions.   If during the course of the call the provider feels the appointment is not appropriate, you will not be charged for this service.  Provider has received verbal consent for billable virtual visit from the patient? Yes  Will anyone else be joining your video visit? No    Call initiated at: 1100   Type of Visit Platform Used: [email or web address] Video  Location of provider: Home  Location of patient: [facility]ICE CLINIC  PROGRESS REPORT (CPT 84940)    Patient: Aris Colon  Date of Service: 1/8/2024  Date of Last Service: 12/29/23  Number of Visits: 3  Referring Physician: Dr. Kathy De Paz    Impressions from evaluation on 11/17/23 by Mark Dutta M.S., CCC-SLP:  Derrick Colon presents today with ongoing chronic cough which began in conjunction with workplace particle exposure, and with recent exacerbation including hemoptysis and emesis.  Laryngeal evaluation today was warranted given recent episodes of hemoptysis to ensure the patient was not recovering from hemorrhagic or ulcerative event.  This demonstrated no acute trauma which is fortunate, though diffuse change to the vocal folds and surrounding tissues from longstanding  cough was seen consistent with previous evaluation if slightly worsened.  Cough response was aggressive to the application of oxymetazoline nasal spray, and guarding and light cough was noted to occur even prior to the administration, which underscores both the hypersensitivity as well as the stress base response to triggers that has developed over time.  Within the exam itself patient was able to attenuate cough through focused relaxation which bodes well for his ability to incorporate therapeutic strategies to reduce cough, though as we discussed full resolution of symptoms is often challenging due to their multifactorial nature especially in the case of irritant exposure.        SUBJECTIVE:  Since Derrick's last session, he reports the following:   He's tried to do his voice exercises frequently, but hasn't noticed any improvement in his cough.   He tries to use the cough stopper technique, but finds it really hard   He really tired of coughing so much that he throws up and sees blood in it. He's left messages for his doctor    OBJECTIVE:  Patient Specific Goal Metrics:      12/29/2023    10:00 AM   Dysponia SLP Goals   How severe is your cough /throat clearing, if 0 is no cough at all and 10 is the worst cough? 10   How much does your cough/throat-clearing problem bother you?            Very Much       THERAPEUTIC ACTIVITIES  Today Derrick participated in the following therapeutic activities:  Counseling and Education:  Asked questions about the nature of his symptoms, and I answered all of these thoroughly.  Reinforcement of the nature of ILS and his symptoms requiring ongoing use of strategies for gradual improvement.    I provided Derrick with explanation and skilled instruction of:  Education and exercises to promote optimal respiratory mechanics were provided:  Explanation of the anatomy and physiology of respiration for phonation; he found this to be helpful  He demonstrated excessive upper thoracic engagement  "during inhalation  Difficulty allowing abdominal relaxation for inhalation, and audible inhalation prior to instruction  Targeted practice of optimal breathing mechanics with patient positioned in sitting and a forward leaning position  With clinician support, patient was able to demonstrate improved abdominal relaxation and engagement on inhalation  Optimal exhalation using inward engagement of the abdominal wall with no corresponding collapse of the upper chest cavity was trained using the sustained \"sh\" task  Additional education regarding sternal compression and cough trigger at the melanie was discussed  Derrick demonstrated adequate accuracy with moderate clinician support, including verbal explanation and visual demonstration to facilitate awareness of low respiratory engagement.   Application of respiration to SOVT tasks from previous session.     Instruction of Home Practice:  A revised regimen for home practice was instructed.  I provided an AVS of important notes of today's therapeutic activities to facilitate practice.    ASSESSMENT/PLAN  Progress toward long-term goals:  Adequate but incomplete progress; please see above    Impressions:  IMPRESSIONS: chronic cough   Derrick had a productive session of therapy today, working on respiratory mechanics.  He will continue to work on his exercises on a daily basis, and work on incorporating the techniques into his daily activities. Speech therapy continues to be medically necessary for Derrick to participate fully and engage in activities of daily living.     Plan:   I will see Derrick in 4 weeks, sooner if able, and will plan to check on postural adjustments, specifically lifted sternum and abdominal contraction. Provide hands on training for back expansion. Trial manual release. Introduce laryngeal release and combine all with voice tasks, switching from pursed lip balloon SOVT to bubbles as able.    For home practice goals, see AVS.     TOTAL SERVICE TIME: 60 " minutes  TREATMENT (97[address])    Milagros Landa (voice), M.S., CCC-SLP  Speech-Language Pathologist  [facility]  [phone]  [email or web address]  Pronouns: she/her/hers      *this report was created in part through the use of computerized dictation software, and though reviewed following completion, some typographic errors may persist.  If there is confusion regarding any of this notes contents, please contact me for clarification    OBJECTIVE FINDINGS  PATIENT REPORTED MEASURES  Patient Supplied Answers To Last 2 VHI Questionnaires       No data to display                   Patient Supplied Answers To Last 2 CSI Questionnaires       No data to display                   Patient Supplied Answers To Last 2 EAT Questionnaires       No data to display                  Patient Supplied Answers to Dyspnea Index Questionnaire:       No data to display                   Again, thank you for allowing me to participate in the care of your patient.      Sincerely,    Ora Shepard, SLP

## 2024-01-09 ENCOUNTER — TELEPHONE (OUTPATIENT)
Dept: SLEEP MEDICINE | Facility: CLINIC | Age: 54
End: 2024-01-09
Payer: COMMERCIAL

## 2024-01-09 NOTE — TELEPHONE ENCOUNTER
Paperwork received via mail from [employer]. Forms have been labeled and given placed in Dr. Euceda folder to review.

## 2024-01-17 ENCOUNTER — OFFICE VISIT (OUTPATIENT)
Dept: OTOLARYNGOLOGY | Facility: CLINIC | Age: 54
End: 2024-01-17
Payer: OTHER MISCELLANEOUS

## 2024-01-17 DIAGNOSIS — R05.3 CHRONIC COUGH: Primary | ICD-10-CM

## 2024-01-17 PROCEDURE — 92507 TX SP LANG VOICE COMM INDIV: CPT | Mod: GN | Performed by: SPEECH-LANGUAGE PATHOLOGIST

## 2024-01-17 NOTE — PROGRESS NOTES
[facility] VOICE CLINIC  PROGRESS REPORT (CPT 09349)    Patient: Aris Colon  Date of Service: 1/17/2024  Date of Last Service: 1/8/24  Number of Visits: 4  Referring Physician: Dr. Kathy De Paz    Impressions from evaluation on 11/17/23 by Mark Dutta M.S., CCC-SLP:  Derrick Colon presents today with ongoing chronic cough which began in conjunction with workplace particle exposure, and with recent exacerbation including hemoptysis and emesis.  Laryngeal evaluation today was warranted given recent episodes of hemoptysis to ensure the patient was not recovering from hemorrhagic or ulcerative event.  This demonstrated no acute trauma which is fortunate, though diffuse change to the vocal folds and surrounding tissues from longstanding cough was seen consistent with previous evaluation if slightly worsened.  Cough response was aggressive to the application of oxymetazoline nasal spray, and guarding and light cough was noted to occur even prior to the administration, which underscores both the hypersensitivity as well as the stress base response to triggers that has developed over time.  Within the exam itself patient was able to attenuate cough through focused relaxation which bodes well for his ability to incorporate therapeutic strategies to reduce cough, though as we discussed full resolution of symptoms is often challenging due to their multifactorial nature especially in the case of irritant exposure.      SUBJECTIVE:  Since Derrick's last session, he reports the following:   He has continued to be extremely frustrated with his ongoing coughing.   He tried to use his recommended strategies, but often finds that the cough gets away from him and he forgets to use the strategies.   His cough significantly impacts his QOL, by physically being painful and exhausting, but also by limiting his ability to leave his house due to coughing and people being nervous around him.     OBJECTIVE:  Patient Specific  "Goal Metrics:      12/29/2023    10:00 AM   Dysponia SLP Goals   How severe is your cough /throat clearing, if 0 is no cough at all and 10 is the worst cough? 10   How much does your cough/throat-clearing problem bother you?            Very Much       THERAPEUTIC ACTIVITIES  Today Derrick participated in the following therapeutic activities:  Counseling and Education:  Asked questions about the nature of his symptoms, and I answered all of these thoroughly.  Ongoing reinforcement of irritable larynx cycle and the need for consistent application of strategies  Of note, he reports always trying to \"get the cough out of me\" when he feels the cough trigger, demonstrating significant expiratory force and thoracic compression, leading to his face reddening, reported excessive sweating, and frequent emesis. We discussed that that level of compression will likely continue to cause vomiting, which will continue to significantly irritate his throat. Instead, he was advised to attempt to fill his lungs, keeping the air in if possible, and maintaining elevation of his sternum, along with education explaining why this would be an improvement. He verbalized understanding.     I provided Derrick with explanation and skilled instruction of:  Manual laryngeal massage was performed:  Patient endorsed tightness throughout his next and shoulders, which was palpated along with noted inflammation of his perilaryngeal musculature.  He underwent gentle manual release along the posterior neck/occiptal ridge, sternocleidomastoids, scalenes, mylohyoid/anterior digastric, stylohyoid/posterior digastric, sternohyoid/omohyoid, and thyrohyoid space.   He was then instructed gentle self-massage/release technique targeting the SCMs, lateral perilaryngeal musculature, and supralaryngeal musculature, to good effect.   Tactile, visual, and verbal cues were provided, eventually followed by independent demonstration with therapist monitoring.   Of note, " patient did not cough nearly as frequently as he has during previous sessions, while implementing perilaryngeal releases.   Introduction to phonation using yawn-sigh technique was targeted in combination with a gentle release of the perilaryngeal musculature.  Patient demonstrates a habitual tendency to elevate his larynx with phonation.  He improves with a quiet inhalation through an open mouth, resulting in a release of the supralaryngeal musculature  Gentle sighs on exhalation were encouraged at a much lower intensity level, using tactile feedback to monitor for hyperactivation of the perilaryngeal musculature.  He demonstrated further improvement with instruction of the puppy yawn laryngeal aBductory maneuver, in combination with abdominal breathing.     Instruction of Home Practice:  A revised regimen for home practice was instructed.  I provided an AVS of important notes of today's therapeutic activities to facilitate practice.    ASSESSMENT/PLAN  Progress toward long-term goals:  Adequate but incomplete progress; please see above    Impressions:  IMPRESSIONS: chronic cough   Derrick had a productive session of therapy today, working on manual perilaryngeal release strategies, combined with breathing and phonatory tasks.  He will continue to work on his exercises on a daily basis, and work on incorporating the techniques into his daily activities. Speech therapy continues to be medically necessary for Derrick to participate fully and engage in activities of daily living.     Plan:   I will see Derrick in 1 weeks and will plan to review strategies implemented today, adding additional laryngeal aBductory maneuvers including large diameter straw breath.     For home practice goals, see AVS.     TOTAL SERVICE TIME: 75 minutes  TREATMENT (43852)    Milagros Landa (voice) MNikolaiS., CCC-SLP  Speech-Language Pathologist  [facility]  [phone]  [email or web address]  Pronouns: she/her/hers      *this  report was created in part through the use of computerized dictation software, and though reviewed following completion, some typographic errors may persist.  If there is confusion regarding any of this notes contents, please contact me for clarification    OBJECTIVE FINDINGS  PATIENT REPORTED MEASURES  Patient Supplied Answers To Last 2 VHI Questionnaires       No data to display                   Patient Supplied Answers To Last 2 CSI Questionnaires       No data to display                   Patient Supplied Answers To Last 2 EAT Questionnaires       No data to display                  Patient Supplied Answers to Dyspnea Index Questionnaire:       No data to display

## 2024-01-17 NOTE — LETTER
1/17/2024       RE: Aris Colon  [address] [place] [id number]     Dear Colleague,    Thank you for referring your patient, Aris Colon, to the [facility] VOICE CLINIC [place] [facility]. Please see a copy of my visit note below.    [facility]  PROGRESS REPORT (CPT 07388)    Patient: Aris Colon  Date of Service: 1/17/2024  Date of Last Service: 1/8/24  Number of Visits: 4  Referring Physician: Dr. Kathy De Paz    Impressions from evaluation on 11/17/23 by Mark Dutta M.S., CCC-SLP:  Derrick Colon presents today with ongoing chronic cough which began in conjunction with workplace particle exposure, and with recent exacerbation including hemoptysis and emesis.  Laryngeal evaluation today was warranted given recent episodes of hemoptysis to ensure the patient was not recovering from hemorrhagic or ulcerative event.  This demonstrated no acute trauma which is fortunate, though diffuse change to the vocal folds and surrounding tissues from longstanding cough was seen consistent with previous evaluation if slightly worsened.  Cough response was aggressive to the application of oxymetazoline nasal spray, and guarding and light cough was noted to occur even prior to the administration, which underscores both the hypersensitivity as well as the stress base response to triggers that has developed over time.  Within the exam itself patient was able to attenuate cough through focused relaxation which bodes well for his ability to incorporate therapeutic strategies to reduce cough, though as we discussed full resolution of symptoms is often challenging due to their multifactorial nature especially in the case of irritant exposure.      SUBJECTIVE:  Since Derrick's last session, he reports the following:   He has continued to be extremely frustrated with his ongoing coughing.   He tried to use his  "recommended strategies, but often finds that the cough gets away from him and he forgets to use the strategies.   His cough significantly impacts his QOL, by physically being painful and exhausting, but also by limiting his ability to leave his house due to coughing and people being nervous around him.     OBJECTIVE:  Patient Specific Goal Metrics:      12/29/2023    10:00 AM   Dysponia SLP Goals   How severe is your cough /throat clearing, if 0 is no cough at all and 10 is the worst cough? 10   How much does your cough/throat-clearing problem bother you?            Very Much       THERAPEUTIC ACTIVITIES  Today Derrick participated in the following therapeutic activities:  Counseling and Education:  Asked questions about the nature of his symptoms, and I answered all of these thoroughly.  Ongoing reinforcement of irritable larynx cycle and the need for consistent application of strategies  Of note, he reports always trying to \"get the cough out of me\" when he feels the cough trigger, demonstrating significant expiratory force and thoracic compression, leading to his face reddening, reported excessive sweating, and frequent emesis. We discussed that that level of compression will likely continue to cause vomiting, which will continue to significantly irritate his throat. Instead, he was advised to attempt to fill his lungs, keeping the air in if possible, and maintaining elevation of his sternum, along with education explaining why this would be an improvement. He verbalized understanding.     I provided Derrick with explanation and skilled instruction of:  Manual laryngeal massage was performed:  Patient endorsed tightness throughout his next and shoulders, which was palpated along with noted inflammation of his perilaryngeal musculature.  He underwent gentle manual release along the posterior neck/occiptal ridge, sternocleidomastoids, scalenes, mylohyoid/anterior digastric, stylohyoid/posterior digastric, " sternohyoid/omohyoid, and thyrohyoid space.   He was then instructed gentle self-massage/release technique targeting the SCMs, lateral perilaryngeal musculature, and supralaryngeal musculature, to good effect.   Tactile, visual, and verbal cues were provided, eventually followed by independent demonstration with therapist monitoring.   Of note, patient did not cough nearly as frequently as he has during previous sessions, while implementing perilaryngeal releases.   Introduction to phonation using yawn-sigh technique was targeted in combination with a gentle release of the perilaryngeal musculature.  Patient demonstrates a habitual tendency to elevate his larynx with phonation.  He improves with a quiet inhalation through an open mouth, resulting in a release of the supralaryngeal musculature  Gentle sighs on exhalation were encouraged at a much lower intensity level, using tactile feedback to monitor for hyperactivation of the perilaryngeal musculature.  He demonstrated further improvement with instruction of the puppy yawn laryngeal aBductory maneuver, in combination with abdominal breathing.     Instruction of Home Practice:  A revised regimen for home practice was instructed.  I provided an AVS of important notes of today's therapeutic activities to facilitate practice.    ASSESSMENT/PLAN  Progress toward long-term goals:  Adequate but incomplete progress; please see above    Impressions:  IMPRESSIONS: chronic cough   Derrick had a productive session of therapy today, working on manual perilaryngeal release strategies, combined with breathing and phonatory tasks.  He will continue to work on his exercises on a daily basis, and work on incorporating the techniques into his daily activities. Speech therapy continues to be medically necessary for Derrcik to participate fully and engage in activities of daily living.     Plan:   I will see Derrick in 1 weeks and will plan to review strategies implemented today, adding  additional laryngeal aBductory maneuvers including large diameter straw breath.     For home practice goals, see AVS.     TOTAL SERVICE TIME: 75 minutes  TREATMENT (34090)    Milagros Landa (voice) M.S., CCC-SLP  Speech-Language Pathologist  [facility] Voice [facility]  [phone]  [email or web address]  Pronouns: she/her/hers      *this report was created in part through the use of computerized dictation software, and though reviewed following completion, some typographic errors may persist.  If there is confusion regarding any of this notes contents, please contact me for clarification    OBJECTIVE FINDINGS  PATIENT REPORTED MEASURES  Patient Supplied Answers To Last 2 VHI Questionnaires       No data to display                   Patient Supplied Answers To Last 2 CSI Questionnaires       No data to display                   Patient Supplied Answers To Last 2 EAT Questionnaires       No data to display                  Patient Supplied Answers to Dyspnea Index Questionnaire:       No data to display                   Again, thank you for allowing me to participate in the care of your patient.      Sincerely,    Ora Shepard, SLP

## 2024-01-19 ENCOUNTER — TELEPHONE (OUTPATIENT)
Dept: OTOLARYNGOLOGY | Facility: CLINIC | Age: 54
End: 2024-01-19
Payer: COMMERCIAL

## 2024-01-19 NOTE — PATIENT INSTRUCTIONS
Hello!     It was a pleasure to see you today. Please complete the exercises below and remember, a few minutes of practice many times throughout the day is more important than one large practice session. If you have any questions about your strategies, feel free to contact me at [email or web address] or via [email or web address]. Please call [phone] for scheduling alterations or to be seen sooner in case of cancellations.      Place REMINDER NOTES all over your house to 1.lift your sternum, 2. Crunch your belly, 3. Fist balloon!!!!  CHEST UP! GUT PUNCH!!!    Remember that your new goal is to KEEP THE AIR IN!!! Getting everything out leads to compression of your body, which is what makes you vomit and keep coughing.     Home Exercise Program:  Sternocleidomastoid  1. Your SCM, or sternocleidomastoid, is the muscle that attaches to the bony bump behind each each and courses downward and slightly forward along your neck, attaching at the other end to your clavicle and sternum. If you turn your head to the side, you can feel the SCM muscle sticking out.   2. With your head facing forward, use the flat part of your thumb and pointer to gently pinch, knead, and massage the SCM on ONE SIDE at a time.   3. You can gently pinch and hold the SCM, or rub your thumb and pointer back and forth to knead the SCM, or gently jiggle the SCM forward and backward. Eventually add deep inhalations or sighs so that your SCM learns to also relax in the context of speaking/singing.   4. Now switch to the other side and repeat.   5. Remember that you never want to use more than 3 or 4/10 intensity. It's better to be gentle and repeat this strategy several times a day than to be aggressive and cause the SCM to tense up more in response to discomfort. If you get dizzy or feel a strong pulse, please move your finger or wait until we can review this.     Lateral Laryngeal Stretch:  1. Position your hands like praying, then rotate both sets of  "fingers toward yourself and place them along each side of your marco's apple. OR, place your thumb and two fingers on each side of your marco's apple.   2. Gentle shift your marco's apple side-to-side, only about a centimeter in each direction.   3. Decide which direction feels easier.   4. Shift your marco's apple about a centimeter in the easier direction and hold, breathing slow, deep breaths for about 120 seconds.   5. Shift the other direction and hold, breathing slow, deep breaths for about 120 seconds.     Downward Laryngeal Stretch:  1. Gently find the groove at the top of your marco's apple, then follow the groove to each side and place a thumb and pointer finger on each side of your marco's apple, within the slight indented groove along the top of each side.   2. Inhale slowly with a yawny sensation, gently pulling downward along your marco's apple (Don't pinch hard, just enough to \"suggest\" a downward movement).   3. Reset your fingers at the top of your larynx and repeat several times.   4. Always be gentle- don't use enough pressure that you would bruise a peach or banana if you were holding one between your fingers.   5. After repeating 3-5 times, add a gentle sigh as you exhale \"Huuuhhhhh\"     SILENT INHALATION  1. Noise, particularly higher pitch noise, as you inhale means you've likely tightened somewhere. The more noisy and tight your inhalation is, the more likely you'll start coughing.   2. Relax your mouth open with your tongue flat and forward like a puppy (look in the mirror for space above your tongue).   3. Inhale SILENTLY through you mouth, feeling cool and quiet air flow through your mouth and down the back of your throat. Double-check that your belly is expanding as you inhale.    BELLY BREATHING  Stretch: (each morning)  Stretch your arms up and take two slow, deep breaths, feeling your rib cage lift and stretch.    Then lean toward each side, taking 2 slow, deep breaths on each side, feeling " "your ribcage lift and expand along the sides.   Now leaning back, arching your back to feel a stretch along the front of your ribs, and take 2 more slow, deep breaths.    Lastly, lean forward and allow your back and neck to stretch and take 2 slow, deep breaths.      Practice: (5 breaths per hour or 10 breaths at each meal)  Sit hinged forward with your elbows on your knees. OR Sit normally and place your hands on either side of your belly and back or low ribs  If someone is nearby to help, they can place their hands on the sides of your back and ribs.   Slowly breathe in and feel your belly and back expand, like filling a balloon, pushing out against your waistband  Slowly breathe out on [facility] and feel your belly and back crunch inward, like zipping tight pants  Repeat slowly and take breaks if you get dizzy     HELPFUL HINTS:  -- Tie a string or piece of elastic around your low ribs when you get dressed. You'll be able to feel yourself expand against this as you breathe all day.   -- Some people find it easiest to practice while laying on their back or in a recliner, hands on their belly.  -- You can also lay on your stomach with your arms over your head to feel your back expand. It's not unusual for people to struggle noticing their back expanding to start with.      WHEN YOU COUGH:   Keep your sternum lifted  Make the abs crunch up and in, like someone punching you in the gut.   ____________________________________________________     VOICE EXERCISES (1x/hour, when you can)  -- Inhale SILENTLY and feel your belly fill with air  -- Slowly exhale as your belly contracts, ROUND \"OO\" LIPS  -- Hold your streamer about 6 inches from your lips  -- CHEEKS lightly PUFFED  -- Think of saying \"Whoooooo\" and watch for a smooth, gentle streamer movement, like a flag blowing in a breeze  3-5x silently with just air for 5 seconds  3-5x easy sigh on \"whooo\" (double-check your sternum is NOT helping)  3-5x foghorn on single " "pitch for 5 seconds  3-5x lower for 5 seconds (tugboat)  3-5x higher for 5 seconds (flutey and breathy)  3-5x sliding up and down like sir  -- Double-check that your CHEEKS ARE PUFFED and STREAMER IS BLOWING GENTLY         COUGH STOPPER  1. Press your fist against your lips  2. Aim to balloon your cheeks and throat as you cough  3. ONLY breathe back in through your nose- inhaling through your mouth triggers more coughing  4. Keep repeating the balloon and sniff, balloon and sniff, until the spasm stops.     If you are willing, teach this to friends and family so that when you start coughing, someone next to you can say \"Make a fist! Balloon! Balloon!\"  OR put up post-it note reminders for yourself        Thank you and have a great day!  Rosendo Marmolejo. (voice), M.S., CCC-SLP  Speech-Language Pathologist  [facility]  [phone]  [email or web address]  Pronouns: she/her/hers  "

## 2024-01-25 ENCOUNTER — OFFICE VISIT (OUTPATIENT)
Dept: OTOLARYNGOLOGY | Facility: CLINIC | Age: 54
End: 2024-01-25
Payer: OTHER MISCELLANEOUS

## 2024-01-25 DIAGNOSIS — R05.3 CHRONIC COUGH: Primary | ICD-10-CM

## 2024-01-25 PROCEDURE — 92507 TX SP LANG VOICE COMM INDIV: CPT | Mod: GN | Performed by: SPEECH-LANGUAGE PATHOLOGIST

## 2024-01-25 NOTE — PATIENT INSTRUCTIONS
"Hello!     It was a pleasure to see you today. Dr. De Paz messaged Nancy Philippe and Sammi this afternoon to ask about the superior laryngeal nerve block and medications. We'll be in touch once we hear back and can get you scheduled with one of them.       Please complete the exercises below and remember, a few minutes of practice many times throughout the day is more important than one large practice session. If you have any questions about your strategies, feel free to contact me at [email or web address] or via [email or web address]. Please call [phone] for scheduling alterations or to be seen sooner in case of cancellations.      Remember that your new goal is to KEEP THE AIR IN!!! Getting everything out leads to compression of your body, which is what makes you vomit and keep coughing.     Home Exercise Program:  SILENT BREATHING STRATEGIES   These help to relax the throat and keep things calm. Noise, particularly higher pitch noise, as you inhale means you've likely tightened somewhere. The more noisy and tight your inhalation is, the more likely you'll start coughing.     Pick 1 inhalation and 1 exhalation, then repeat back and forth until your throat feels calmer.   INHALATION: EXHALATION:   1. Tongue tuck  2. Sniff  3. Puppy Yawn  4. Straw  5. Rounded lip  6. Lip Whistle or Kazoo 1. Tongue tuck  2. Ballooned cheeks  3. Fist balloon  4. Shhhhhhh  5. Sh sh sh sh sh...  6. Lip Whistle or Kazoo     Adding Voice:  For 2-3 minutes, here and there throughout the day, use your lip whistle or kazoo:   1. Inhale silently through the lip whistle or kazoo  2. Gently sigh out, letting air flow through the lip whistle or kazoo, adding your voice on \"Whoooooo\"  3. Play with pitch glides, gliding down, gliding up, gliding up and down like a siren  4. Double check that you hear the buzz/spin of the lip whistle/kazoo     ____________________________________________________    Sternocleidomastoid  1. Your SCM, or " sternocleidomastoid, is the muscle that attaches to the bony bump behind each each and courses downward and slightly forward along your neck, attaching at the other end to your clavicle and sternum. If you turn your head to the side, you can feel the SCM muscle sticking out.   2. With your head facing forward, use the flat part of your thumb and pointer to gently pinch, knead, and massage the SCM on ONE SIDE at a time.   3. You can gently pinch and hold the SCM, or rub your thumb and pointer back and forth to knead the SCM, or gently jiggle the SCM forward and backward. Eventually add deep inhalations or sighs so that your SCM learns to also relax in the context of speaking/singing.   4. Now switch to the other side and repeat.   5. Remember that you never want to use more than 3 or 4/10 intensity. It's better to be gentle and repeat this strategy several times a day than to be aggressive and cause the SCM to tense up more in response to discomfort. If you get dizzy or feel a strong pulse, please move your finger or wait until we can review this.      Lateral Laryngeal Stretch:  1. Position your hands like praying, then rotate both sets of fingers toward yourself and place them along each side of your marco's apple. OR, place your thumb and two fingers on each side of your marco's apple.   2. Gentle shift your marco's apple side-to-side, only about a centimeter in each direction.   3. Decide which direction feels easier.   4. Shift your marco's apple about a centimeter in the easier direction and hold, breathing slow, deep breaths for about 120 seconds.   5. Shift the other direction and hold, breathing slow, deep breaths for about 120 seconds.      Downward Laryngeal Stretch:  1. Gently find the groove at the top of your marco's apple, then follow the groove to each side and place a thumb and pointer finger on each side of your marco's apple, within the slight indented groove along the top of each side.   2. Inhale  "slowly with a yawny sensation, gently pulling downward along your marco's apple (Don't pinch hard, just enough to \"suggest\" a downward movement).   3. Reset your fingers at the top of your larynx and repeat several times.   4. Always be gentle- don't use enough pressure that you would bruise a peach or banana if you were holding one between your fingers.   5. After repeating 3-5 times, add a gentle sigh as you exhale \"Huuuhhhhh\"   ____________________________________________________     BELLY BREATHING  Stretch: (each morning)  Stretch your arms up and take two slow, deep breaths, feeling your rib cage lift and stretch.    Then lean toward each side, taking 2 slow, deep breaths on each side, feeling your ribcage lift and expand along the sides.   Now leaning back, arching your back to feel a stretch along the front of your ribs, and take 2 more slow, deep breaths.    Lastly, lean forward and allow your back and neck to stretch and take 2 slow, deep breaths.      Practice: (5 breaths per hour or 10 breaths at each meal)  Sit hinged forward with your elbows on your knees. OR Sit normally and place your hands on either side of your belly and back or low ribs  If someone is nearby to help, they can place their hands on the sides of your back and ribs.   Slowly breathe in and feel your belly and back expand, like filling a balloon, pushing out against your waistband  Slowly breathe out on [facility] and feel your belly and back crunch inward, like zipping tight pants  Repeat slowly and take breaks if you get dizzy     HELPFUL HINTS:  -- Tie a string or piece of elastic around your low ribs when you get dressed. You'll be able to feel yourself expand against this as you breathe all day.   -- Some people find it easiest to practice while laying on their back or in a recliner, hands on their belly.  -- You can also lay on your stomach with your arms over your head to feel your back expand. It's not unusual for people to struggle " "noticing their back expanding to start with.      WHEN YOU COUGH:   Keep your sternum lifted  Make the abs crunch up and in, like someone punching you in the gut.   ____________________________________________________      COUGH STOPPER  1. Press your fist against your lips  2. Aim to balloon your cheeks and throat as you cough  3. ONLY breathe back in through your nose- inhaling through your mouth triggers more coughing  4. Keep repeating the balloon and sniff, balloon and sniff, until the spasm stops.     If you are willing, teach this to friends and family so that when you start coughing, someone next to you can say \"Make a fist! Balloon! Balloon!\"  OR put up post-it note reminders for yourself        Thank you and have a great day!  Rosendo Marmolejo. (voice), M.S., CCC-SLP  Speech-Language Pathologist  [facility] Voice [facility]  [phone]  [email or web address]  Pronouns: she/her/hers  "

## 2024-01-25 NOTE — LETTER
1/25/2024       RE: Aris Colon  [address] [place] [id number]     Dear Colleague,    Thank you for referring your patient, Aris Colon, to the [facility]ICE [facility] [place] [facility]. Please see a copy of my visit note below.    [facility]  PROGRESS REPORT (CPT 35057)    Patient: Aris Colon  Date of Service: 1/25/2024  Date of Last Service: 1/17/24  Number of Visits: 5  Referring Physician: Dr. Kathy De Paz    Impressions from evaluation on 11/17/23 by Mark Dutta M.S., CCC-SLP:  Derrick Colon presents today with ongoing chronic cough which began in conjunction with workplace particle exposure, and with recent exacerbation including hemoptysis and emesis.  Laryngeal evaluation today was warranted given recent episodes of hemoptysis to ensure the patient was not recovering from hemorrhagic or ulcerative event.  This demonstrated no acute trauma which is fortunate, though diffuse change to the vocal folds and surrounding tissues from longstanding cough was seen consistent with previous evaluation if slightly worsened.  Cough response was aggressive to the application of oxymetazoline nasal spray, and guarding and light cough was noted to occur even prior to the administration, which underscores both the hypersensitivity as well as the stress base response to triggers that has developed over time.  Within the exam itself patient was able to attenuate cough through focused relaxation which bodes well for his ability to incorporate therapeutic strategies to reduce cough, though as we discussed full resolution of symptoms is often challenging due to their multifactorial nature especially in the case of irritant exposure.      SUBJECTIVE:  Since Derrick's last session, he reports the following:   Derrick reports his cough has been about the same the past week  He's a little unsure how to do  his manual release strategies and would like to review today. The lateral laryngeal stretch is very sensitive.   He's trying not to get discouraged, acknowledging that it will take a while to see improvement.   He's tried using warm water in a bath or heated blanket to reduce the tightness in his neck muscles.   He's been trying to keep his mouth shut and not allow the cough to come out as easily, which he feels makes his body hotter.   He did vomit a couple of times in the past week, and has only noticed spots of blood- not a lot like the previous week.   Of note, he normally would shoot pool 3x/week but has been putting everything on hold and stays home because he's tired of coughing and people looking at him. He ends up sleeping a lot.   He's tried practicing his puppy yawn and feels like it neither flares up coughing or necessarily represses it.     OBJECTIVE:  Patient Specific Goal Metrics:      12/29/2023    10:00 AM   Dysponia SLP Goals   How severe is your cough /throat clearing, if 0 is no cough at all and 10 is the worst cough? 10   How much does your cough/throat-clearing problem bother you?            Very Much       THERAPEUTIC ACTIVITIES  Today Derrick participated in the following therapeutic activities:  Counseling and Education:  Asked questions about the nature of his symptoms, and I answered all of these thoroughly.    I provided Derrick with explanation and skilled instruction of:  Rescue breathing strategies using oral configurations to promote improved vocal fold abduction in the context of chronic cough were instructed  Patient reported the tongue tuck was most beneficial  Patient was able to demonstrate use of these techniques in combination with low respiratory engagement with adequate accuracy and moderate to maximal clinician support  Visual instructions were provided on a handout for each inhalation/exhalation, as provider was masked and unable to demonstrate.  Strategies included were  Inhalation: tongue tuck, straw sip, sniff, puppy yawn, lip whistle, kazoo, large straw and Exhalation: tongue tuck, pursed lip/ballooned cheeks, fist balloon, sustained shhhh, pulsed shhh, unvoiced lip whistle/kazoo, and voiced lip whistle/kazoo.   Patient generally did not cough while actively performing these strategies, stating he felt like they kept his throat more even  SOVT tasks were applied using a sigh exhalation in combination with lip whistle and kazoo, to better effect than initial attempts at straw bubble SOVTs.   He was able to demonstrate improved combination of air flow with phonation simultaneously, though this does not yet generalize to his speaking voice.   He continues to experience increased coughing fits with talking  A plan for when and how to implement these strategies was developed, and the patient was encouraged to practice the techniques independent of distress at least twice daily to habituate their use.   Tactile reinforcement of abdominal breathing pattern and elevated/stable ribcage posture  Patient demonstrates significantly improved consistency and accy of abdominal  breathing pattern compared to previous session, with less reinforcement needed by clinician  He was able to continue demonstrating abdominal pattern while focused on newly introduced rescue breathing strategies.   Tactile reinforcement of shoulder girdle position was provided when patient would begin coughing, helping reduce his tendency to fold forward with sternal compression. This appeared to be helpful as patient appeared to stop coughing faster than observed previously and didn't turn as red as he did previously during cough fits.     Instruction of Home Practice:  A revised regimen for home practice was instructed.  I provided an AVS of important notes of today's therapeutic activities to facilitate practice.    ASSESSMENT/PLAN  Progress toward long-term goals:  Adequate but incomplete progress; please see  above    Impressions:  IMPRESSIONS: chronic cough   Derrick had a productive session of therapy today, working on reinforcement of breathing mechanics and introduction to rescue breathing strategies, with voicing added.  He will continue to work on his exercises on a daily basis, and work on incorporating the techniques into his daily activities. Speech therapy continues to be medically necessary for Derrick to participate fully and engage in activities of daily living.     Plan:   I will see Derrick in 2-3 weeks, as scheduling allows, and will plan to review manual release strategies, review rescue breathing strategies with lip whistle/kazoo phonation. Progress as able to pitch patterns, then progress to flow phonation if able. Continue monitoring for ribcage stability during coughing.     For home practice goals, see AVS.     TOTAL SERVICE TIME: 75 minutes  TREATMENT (80129)    Milagros Landa (voice) M.S., CCC-SLP  Speech-Language Pathologist  [facility]  [phone]  [email or web address]  Pronouns: she/her/hers      *this report was created in part through the use of computerized dictation software, and though reviewed following completion, some typographic errors may persist.  If there is confusion regarding any of this notes contents, please contact me for clarification    OBJECTIVE FINDINGS  PATIENT REPORTED MEASURES  Patient Supplied Answers To Last 2 VHI Questionnaires       No data to display                   Patient Supplied Answers To Last 2 CSI Questionnaires       No data to display                   Patient Supplied Answers To Last 2 EAT Questionnaires       No data to display                  Patient Supplied Answers to Dyspnea Index Questionnaire:       No data to display                   Again, thank you for allowing me to participate in the care of your patient.      Sincerely,    Ora Shepard, SLP

## 2024-01-25 NOTE — PROGRESS NOTES
[facility] VOICE CLINIC  PROGRESS REPORT (CPT 39403)    Patient: Aris Colon  Date of Service: 1/25/2024  Date of Last Service: 1/17/24  Number of Visits: 5  Referring Physician: Dr. Kathy De Paz    Impressions from evaluation on 11/17/23 by Mark Dutta M.S., CCC-SLP:  Derrick Colon presents today with ongoing chronic cough which began in conjunction with workplace particle exposure, and with recent exacerbation including hemoptysis and emesis.  Laryngeal evaluation today was warranted given recent episodes of hemoptysis to ensure the patient was not recovering from hemorrhagic or ulcerative event.  This demonstrated no acute trauma which is fortunate, though diffuse change to the vocal folds and surrounding tissues from longstanding cough was seen consistent with previous evaluation if slightly worsened.  Cough response was aggressive to the application of oxymetazoline nasal spray, and guarding and light cough was noted to occur even prior to the administration, which underscores both the hypersensitivity as well as the stress base response to triggers that has developed over time.  Within the exam itself patient was able to attenuate cough through focused relaxation which bodes well for his ability to incorporate therapeutic strategies to reduce cough, though as we discussed full resolution of symptoms is often challenging due to their multifactorial nature especially in the case of irritant exposure.      SUBJECTIVE:  Since Derrick's last session, he reports the following:   Derrick reports his cough has been about the same the past week  He's a little unsure how to do his manual release strategies and would like to review today. The lateral laryngeal stretch is very sensitive.   He's trying not to get discouraged, acknowledging that it will take a while to see improvement.   He's tried using warm water in a bath or heated blanket to reduce the tightness in his neck muscles.   He's been trying to  keep his mouth shut and not allow the cough to come out as easily, which he feels makes his body hotter.   He did vomit a couple of times in the past week, and has only noticed spots of blood- not a lot like the previous week.   Of note, he normally would shoot pool 3x/week but has been putting everything on hold and stays home because he's tired of coughing and people looking at him. He ends up sleeping a lot.   He's tried practicing his puppy yawn and feels like it neither flares up coughing or necessarily represses it.     OBJECTIVE:  Patient Specific Goal Metrics:      12/29/2023    10:00 AM   Dysponia SLP Goals   How severe is your cough /throat clearing, if 0 is no cough at all and 10 is the worst cough? 10   How much does your cough/throat-clearing problem bother you?            Very Much       THERAPEUTIC ACTIVITIES  Today Derrick participated in the following therapeutic activities:  Counseling and Education:  Asked questions about the nature of his symptoms, and I answered all of these thoroughly.    I provided Derrick with explanation and skilled instruction of:  Rescue breathing strategies using oral configurations to promote improved vocal fold abduction in the context of chronic cough were instructed  Patient reported the tongue tuck was most beneficial  Patient was able to demonstrate use of these techniques in combination with low respiratory engagement with adequate accuracy and moderate to maximal clinician support  Visual instructions were provided on a handout for each inhalation/exhalation, as provider was masked and unable to demonstrate.  Strategies included were Inhalation: tongue tuck, straw sip, sniff, puppy yawn, lip whistle, kazoo, large straw and Exhalation: tongue tuck, pursed lip/ballooned cheeks, fist balloon, sustained shhhh, pulsed shhh, unvoiced lip whistle/kazoo, and voiced lip whistle/kazoo.   Patient generally did not cough while actively performing these strategies, stating he felt  like they kept his throat more even  SOVT tasks were applied using a sigh exhalation in combination with lip whistle and kazoo, to better effect than initial attempts at straw bubble SOVTs.   He was able to demonstrate improved combination of air flow with phonation simultaneously, though this does not yet generalize to his speaking voice.   He continues to experience increased coughing fits with talking  A plan for when and how to implement these strategies was developed, and the patient was encouraged to practice the techniques independent of distress at least twice daily to habituate their use.   Tactile reinforcement of abdominal breathing pattern and elevated/stable ribcage posture  Patient demonstrates significantly improved consistency and accy of abdominal  breathing pattern compared to previous session, with less reinforcement needed by clinician  He was able to continue demonstrating abdominal pattern while focused on newly introduced rescue breathing strategies.   Tactile reinforcement of shoulder girdle position was provided when patient would begin coughing, helping reduce his tendency to fold forward with sternal compression. This appeared to be helpful as patient appeared to stop coughing faster than observed previously and didn't turn as red as he did previously during cough fits.     Instruction of Home Practice:  A revised regimen for home practice was instructed.  I provided an AVS of important notes of today's therapeutic activities to facilitate practice.    ASSESSMENT/PLAN  Progress toward long-term goals:  Adequate but incomplete progress; please see above    Impressions:  IMPRESSIONS: chronic cough   Derrick had a productive session of therapy today, working on reinforcement of breathing mechanics and introduction to rescue breathing strategies, with voicing added.  He will continue to work on his exercises on a daily basis, and work on incorporating the techniques into his daily activities.  Speech therapy continues to be medically necessary for Derrick to participate fully and engage in activities of daily living.     Plan:   I will see Derrick in 2-3 weeks, as scheduling allows, and will plan to review manual release strategies, review rescue breathing strategies with lip whistle/kazoo phonation. Progress as able to pitch patterns, then progress to flow phonation if able. Continue monitoring for ribcage stability during coughing.     For home practice goals, see AVS.     TOTAL SERVICE TIME: 75 minutes  TREATMENT (14644)    Milagros Landa (voice), M.S., CCC-SLP  Speech-Language Pathologist  [facility]  [phone]  [email or web address]  Pronouns: she/her/hers      *this report was created in part through the use of computerized dictation software, and though reviewed following completion, some typographic errors may persist.  If there is confusion regarding any of this notes contents, please contact me for clarification    OBJECTIVE FINDINGS  PATIENT REPORTED MEASURES  Patient Supplied Answers To Last 2 VHI Questionnaires       No data to display                   Patient Supplied Answers To Last 2 CSI Questionnaires       No data to display                   Patient Supplied Answers To Last 2 EAT Questionnaires       No data to display                  Patient Supplied Answers to Dyspnea Index Questionnaire:       No data to display

## 2024-01-29 ENCOUNTER — TELEPHONE (OUTPATIENT)
Dept: OTOLARYNGOLOGY | Facility: CLINIC | Age: 54
End: 2024-01-29
Payer: COMMERCIAL

## 2024-01-30 ENCOUNTER — TELEPHONE (OUTPATIENT)
Dept: OTOLARYNGOLOGY | Facility: CLINIC | Age: 54
End: 2024-01-30
Payer: COMMERCIAL

## 2024-01-31 ENCOUNTER — TELEPHONE (OUTPATIENT)
Dept: OTOLARYNGOLOGY | Facility: CLINIC | Age: 54
End: 2024-01-31
Payer: COMMERCIAL

## 2024-02-01 ENCOUNTER — TELEPHONE (OUTPATIENT)
Dept: OTOLARYNGOLOGY | Facility: CLINIC | Age: 54
End: 2024-02-01
Payer: COMMERCIAL

## 2024-02-02 NOTE — TELEPHONE ENCOUNTER
Has this paperwork been completed and returned.  Call received from patients [facility] Paulette العلي.

## 2024-02-05 DIAGNOSIS — J38.7 IRRITABLE LARYNX SYNDROME: ICD-10-CM

## 2024-02-05 DIAGNOSIS — R05.3 CHRONIC COUGH: Primary | ICD-10-CM

## 2024-02-22 ENCOUNTER — OFFICE VISIT (OUTPATIENT)
Dept: OTOLARYNGOLOGY | Facility: CLINIC | Age: 54
End: 2024-02-22
Payer: OTHER MISCELLANEOUS

## 2024-02-22 DIAGNOSIS — R05.3 CHRONIC COUGH: Primary | ICD-10-CM

## 2024-02-22 PROCEDURE — 92507 TX SP LANG VOICE COMM INDIV: CPT | Mod: GN | Performed by: SPEECH-LANGUAGE PATHOLOGIST

## 2024-02-22 NOTE — LETTER
2/22/2024       RE: Aris Colon  [address] [place] [id number]     Dear Colleague,    Thank you for referring your patient, Aris Colon, to the [facility] [place] at [facility]. Please see a copy of my visit note below.    [facility]  PROGRESS REPORT (CPT 08558)    Patient: Aris Colon  Date of Service: 2/22/2024  Date of Last Service: 1/25/24  Number of Visits: 6  Referring Physician: Dr. Kathy De Paz    Impressions from evaluation on 11/17/23 by Mark Dutta M.S., CCC-SLP:  Derrick Colon presents today with ongoing chronic cough which began in conjunction with workplace particle exposure, and with recent exacerbation including hemoptysis and emesis.  Laryngeal evaluation today was warranted given recent episodes of hemoptysis to ensure the patient was not recovering from hemorrhagic or ulcerative event.  This demonstrated no acute trauma which is fortunate, though diffuse change to the vocal folds and surrounding tissues from longstanding cough was seen consistent with previous evaluation if slightly worsened.  Cough response was aggressive to the application of oxymetazoline nasal spray, and guarding and light cough was noted to occur even prior to the administration, which underscores both the hypersensitivity as well as the stress base response to triggers that has developed over time.  Within the exam itself patient was able to attenuate cough through focused relaxation which bodes well for his ability to incorporate therapeutic strategies to reduce cough, though as we discussed full resolution of symptoms is often challenging due to their multifactorial nature especially in the case of irritant exposure.        SUBJECTIVE:  Since Derrick's last session, he reports the following:   He's continued doing his exercises for the past month.  He has good days and he has bad  days  He has started to see small improvements in his ability to control his cough more.   He went to pool the other night, rather than staying home to avoid being around people. He was able to use the fist cough stopper to control his cough better while he was out.   He needs to keep his voice calm and quiet so that it doesn't trigger coughing the way that louder talking will. Even the quieter voice will eventually trigger throat and chest tightness with coughing fits.   He is able to talk longer with the softer voice now without triggering his cough.  He is still throwing up, triggered by more talking and activity, but no longer sees blood in his vomit. He also won't throw up if he's not talking or doing much, staying home all day.   He doesn't pay attention to turning red and getting sweaty now.     OBJECTIVE:  Patient Specific Goal Metrics:      12/29/2023    10:00 AM 1/25/2024    11:00 AM   Dysponia SLP Goals   How severe is your cough /throat clearing, if 0 is no cough at all and 10 is the worst cough? 10 10   How much does your cough/throat-clearing problem bother you?            Very Much Very Much       THERAPEUTIC ACTIVITIES  Today Derrick participated in the following therapeutic activities:  Counseling and Education:  Asked questions about the nature of his symptoms, and I answered all of these thoroughly.  Discussion of airway clearance devices, such as the acapella and flutter valves, which have also started to be used for voice and upper airway exercises, as they are essentially a semi-occluded vocal tract device.     I provided Derrick with explanation and skilled instruction of:  Reinforcement and modification of the previously instructed Straw Sip & Balloon task, specifically in an attempt to stop a coughing fit which has already started.   Derrick continues to demonstrate slight sternal compression and forward shoulder carriage while coughing, with provider also noting a breath holding pattern just  "before he begins coughing.  We discussed that both of these factors will further increase the tight sensation in his throat and are more likely to trigger coughing.   He verbalized understanding.   With ongoing verbal, visual, and tactile cues throughout the session, Derrick was able to resolve approximately 50% of coughing episodes with a reasonable amount of effort. However, he previously was only able to volitionally resolve approximately 5-10% of episodes, showing significant improvement.   Of note, he was not observed turning bright red with protruding vascularity while coughing today, and only needed to spit once during a coughing fit, another significant improvement compared to previous sessions.     Exercises to coordinate phonation with optimal flowing airstream and reduce phonatory impact.   Semi-occluded vocal tract exercises with straw and 1-1.5\" of water in a cup bubbles were most facilitating  Education was provided regarding the targets of SOVT tasks  He progressed through the air-only and simple sigh level of phonatory complexity  Instructed to use as a voice warm up, cool down, coordination of breath flow with phonation, and for tissue mobilization.  Patient demonstrated fair learning, but will need practice and additional reinforcement    Instruction of Home Practice:  A revised regimen for home practice was instructed.  I provided an AVS of important notes of today's therapeutic activities to facilitate practice.    ASSESSMENT/PLAN  Progress toward long-term goals:  Adequate but incomplete progress; please see above    Impressions:  IMPRESSIONS: chronic cough   Derrick had a productive session of therapy today, working on reinforcement of cough mitigation strategies and introduction to SOVT bubble tasks.  He will continue to work on his exercises on a daily basis, and work on incorporating the techniques into his daily activities. Speech therapy continues to be medically necessary for Derrick to " participate fully and engage in activities of daily living.     Plan:   I will see Derrick in 2 weeks and will plan to continue reinforcing chest opening, straw sip, and balloon exhale with coughing. Progress large diameter straw SOVTs as able to pitch patterns.     For home practice goals, see AVS.     TOTAL SERVICE TIME: 70 minutes  TREATMENT (68981)    Milagros Landa (voice) M.S., CCC-SLP  Speech-Language Pathologist  [facility]  [phone]  [email or web address]  Pronouns: she/her/hers      *this report was created in part through the use of computerized dictation software, and though reviewed following completion, some typographic errors may persist.  If there is confusion regarding any of this notes contents, please contact me for clarification    OBJECTIVE FINDINGS  PATIENT REPORTED MEASURES  Patient Supplied Answers To Last 2 VHI Questionnaires       No data to display                   Patient Supplied Answers To Last 2 CSI Questionnaires       No data to display                   Patient Supplied Answers To Last 2 EAT Questionnaires       No data to display                  Patient Supplied Answers to Dyspnea Index Questionnaire:       No data to display                 Ora Shepard, SLP

## 2024-02-22 NOTE — PROGRESS NOTES
[facility]ICE CLINIC  PROGRESS REPORT (CPT [id number])    Patient: Aris Colon  Date of Service: 2/22/2024  Date of Last Service: 1/25/24  Number of Visits: 6  Referring Physician: Dr. Kathy De Paz    Impressions from evaluation on 11/17/23 by Mark Dutta M.S., CCC-SLP:  Derrick Colon presents today with ongoing chronic cough which began in conjunction with workplace particle exposure, and with recent exacerbation including hemoptysis and emesis.  Laryngeal evaluation today was warranted given recent episodes of hemoptysis to ensure the patient was not recovering from hemorrhagic or ulcerative event.  This demonstrated no acute trauma which is fortunate, though diffuse change to the vocal folds and surrounding tissues from longstanding cough was seen consistent with previous evaluation if slightly worsened.  Cough response was aggressive to the application of oxymetazoline nasal spray, and guarding and light cough was noted to occur even prior to the administration, which underscores both the hypersensitivity as well as the stress base response to triggers that has developed over time.  Within the exam itself patient was able to attenuate cough through focused relaxation which bodes well for his ability to incorporate therapeutic strategies to reduce cough, though as we discussed full resolution of symptoms is often challenging due to their multifactorial nature especially in the case of irritant exposure.        SUBJECTIVE:  Since Derrick's last session, he reports the following:   He's continued doing his exercises for the past month.  He has good days and he has bad days  He has started to see small improvements in his ability to control his cough more.   He went to pool the other night, rather than staying home to avoid being around people. He was able to use the fist cough stopper to control his cough better while he was out.   He needs to keep his voice calm and quiet so that it doesn't trigger  coughing the way that louder talking will. Even the quieter voice will eventually trigger throat and chest tightness with coughing fits.   He is able to talk longer with the softer voice now without triggering his cough.  He is still throwing up, triggered by more talking and activity, but no longer sees blood in his vomit. He also won't throw up if he's not talking or doing much, staying home all day.   He doesn't pay attention to turning red and getting sweaty now.     OBJECTIVE:  Patient Specific Goal Metrics:      12/29/2023    10:00 AM 1/25/2024    11:00 AM   Dysponia SLP Goals   How severe is your cough /throat clearing, if 0 is no cough at all and 10 is the worst cough? 10 10   How much does your cough/throat-clearing problem bother you?            Very Much Very Much       THERAPEUTIC ACTIVITIES  Today Derrick participated in the following therapeutic activities:  Counseling and Education:  Asked questions about the nature of his symptoms, and I answered all of these thoroughly.  Discussion of airway clearance devices, such as the acapella and flutter valves, which have also started to be used for voice and upper airway exercises, as they are essentially a semi-occluded vocal tract device.     I provided Derrick with explanation and skilled instruction of:  Reinforcement and modification of the previously instructed Straw Sip & Balloon task, specifically in an attempt to stop a coughing fit which has already started.   Derrick continues to demonstrate slight sternal compression and forward shoulder carriage while coughing, with provider also noting a breath holding pattern just before he begins coughing.  We discussed that both of these factors will further increase the tight sensation in his throat and are more likely to trigger coughing.   He verbalized understanding.   With ongoing verbal, visual, and tactile cues throughout the session, Derrick was able to resolve approximately 50% of coughing episodes with a  "reasonable amount of effort. However, he previously was only able to volitionally resolve approximately 5-10% of episodes, showing significant improvement.   Of note, he was not observed turning bright red with protruding vascularity while coughing today, and only needed to spit once during a coughing fit, another significant improvement compared to previous sessions.     Exercises to coordinate phonation with optimal flowing airstream and reduce phonatory impact.   Semi-occluded vocal tract exercises with straw and 1-1.5\" of water in a cup bubbles were most facilitating  Education was provided regarding the targets of SOVT tasks  He progressed through the air-only and simple sigh level of phonatory complexity  Instructed to use as a voice warm up, cool down, coordination of breath flow with phonation, and for tissue mobilization.  Patient demonstrated fair learning, but will need practice and additional reinforcement    Instruction of Home Practice:  A revised regimen for home practice was instructed.  I provided an AVS of important notes of today's therapeutic activities to facilitate practice.    ASSESSMENT/PLAN  Progress toward long-term goals:  Adequate but incomplete progress; please see above    Impressions:  IMPRESSIONS: chronic cough   Derrick had a productive session of therapy today, working on reinforcement of cough mitigation strategies and introduction to SOVT bubble tasks.  He will continue to work on his exercises on a daily basis, and work on incorporating the techniques into his daily activities. Speech therapy continues to be medically necessary for Derrick to participate fully and engage in activities of daily living.     Plan:   I will see Derrick in 2 weeks and will plan to continue reinforcing chest opening, straw sip, and balloon exhale with coughing. Progress large diameter straw SOVTs as able to pitch patterns.     For home practice goals, see AVS.     TOTAL SERVICE TIME: 70 minutes  TREATMENT " (35803)    Milagros Landa (voice), M.S., CCC-SLP  Speech-Language Pathologist  [facility]  [phone]  [email or web address]  Pronouns: she/her/hers      *this report was created in part through the use of computerized dictation software, and though reviewed following completion, some typographic errors may persist.  If there is confusion regarding any of this notes contents, please contact me for clarification    OBJECTIVE FINDINGS  PATIENT REPORTED MEASURES  Patient Supplied Answers To Last 2 VHI Questionnaires       No data to display                   Patient Supplied Answers To Last 2 CSI Questionnaires       No data to display                   Patient Supplied Answers To Last 2 EAT Questionnaires       No data to display                  Patient Supplied Answers to Dyspnea Index Questionnaire:       No data to display

## 2024-02-22 NOTE — PATIENT INSTRUCTIONS
"Hello!     Dr. De Paz has placed the order for more speech visits. I'll work on getting a copy to Paulette so we can get insurance approval.       Please complete the exercises below and remember, a few minutes of practice many times throughout the day is more important than one large practice session. If you have any questions about your strategies, feel free to contact me at [email or web address] or via [place]. Please call [phone] for scheduling alterations or to be seen sooner in case of cancellations.      Remember that your new goal is to KEEP THE AIR IN!!! Getting everything out leads to compression of your body, which is what makes you vomit and keep coughing.     Home Exercise Program:  1. Shoulders back/chest lifted!  2. Sip in (rounded lips!)  3. Balloon (blow gently out with ballooned cheeks)  4. Repeat as needed, even if you're only getting little snippets of the exercise between coughs.     VOICE EXERCISES (1x/hour)  -- Inhale SILENTLY and feel your belly fill with air  -- Slowly exhale as your belly contracts, ROUND \"OO\" LIPS  -- Large Straw and 1 inch of water in a cup  -- \"Whoooooo\" (smooth bubbles like a motor boat or a simmering pot of water)  5x silently with just air for 5-7 seconds  5x short and easy sighs on \"whoooo\" with bubbles  -- Double-check that your LIPS are ROUNDED, chest lifted      ____________________________________________________    OLDER NOTES BELOW:     SILENT BREATHING STRATEGIES   These help to relax the throat and keep things calm. Noise, particularly higher pitch noise, as you inhale means you've likely tightened somewhere. The more noisy and tight your inhalation is, the more likely you'll start coughing.      Pick 1 inhalation and 1 exhalation, then repeat back and forth until your throat feels calmer.   INHALATION: EXHALATION:   1. Tongue tuck  2. Sniff  3. Puppy Yawn  4. Straw  5. Rounded lip  6. Lip Whistle or Kazoo 1. Tongue tuck  2. Ballooned cheeks  3. Fist " "balloon  4. Shhhhhhh  5. Sh sh sh sh sh...  6. Lip Whistle or Kazoo      Adding Voice:  For 2-3 minutes, here and there throughout the day, use your lip whistle or kazoo:   1. Inhale silently through the lip whistle or kazoo  2. Gently sigh out, letting air flow through the lip whistle or kazoo, adding your voice on \"Whoooooo\"  3. Play with pitch glides, gliding down, gliding up, gliding up and down like a siren  4. Double check that you hear the buzz/spin of the lip whistle/kazoo      ____________________________________________________     Sternocleidomastoid  1. Your SCM, or sternocleidomastoid, is the muscle that attaches to the bony bump behind each each and courses downward and slightly forward along your neck, attaching at the other end to your clavicle and sternum. If you turn your head to the side, you can feel the SCM muscle sticking out.   2. With your head facing forward, use the flat part of your thumb and pointer to gently pinch, knead, and massage the SCM on ONE SIDE at a time.   3. You can gently pinch and hold the SCM, or rub your thumb and pointer back and forth to knead the SCM, or gently jiggle the SCM forward and backward. Eventually add deep inhalations or sighs so that your SCM learns to also relax in the context of speaking/singing.   4. Now switch to the other side and repeat.   5. Remember that you never want to use more than 3 or 4/10 intensity. It's better to be gentle and repeat this strategy several times a day than to be aggressive and cause the SCM to tense up more in response to discomfort. If you get dizzy or feel a strong pulse, please move your finger or wait until we can review this.      Lateral Laryngeal Stretch:  1. Position your hands like praying, then rotate both sets of fingers toward yourself and place them along each side of your marco's apple. OR, place your thumb and two fingers on each side of your marco's apple.   2. Gentle shift your marco's apple side-to-side, only " "about a centimeter in each direction.   3. Decide which direction feels easier.   4. Shift your marco's apple about a centimeter in the easier direction and hold, breathing slow, deep breaths for about 120 seconds.   5. Shift the other direction and hold, breathing slow, deep breaths for about 120 seconds.      Downward Laryngeal Stretch:  1. Gently find the groove at the top of your marco's apple, then follow the groove to each side and place a thumb and pointer finger on each side of your marco's apple, within the slight indented groove along the top of each side.   2. Inhale slowly with a yawny sensation, gently pulling downward along your marco's apple (Don't pinch hard, just enough to \"suggest\" a downward movement).   3. Reset your fingers at the top of your larynx and repeat several times.   4. Always be gentle- don't use enough pressure that you would bruise a peach or banana if you were holding one between your fingers.   5. After repeating 3-5 times, add a gentle sigh as you exhale \"Huuuhhhhh\"   ____________________________________________________     BELLY BREATHING  Stretch: (each morning)  Stretch your arms up and take two slow, deep breaths, feeling your rib cage lift and stretch.    Then lean toward each side, taking 2 slow, deep breaths on each side, feeling your ribcage lift and expand along the sides.   Now leaning back, arching your back to feel a stretch along the front of your ribs, and take 2 more slow, deep breaths.    Lastly, lean forward and allow your back and neck to stretch and take 2 slow, deep breaths.      Practice: (5 breaths per hour or 10 breaths at each meal)  Sit hinged forward with your elbows on your knees. OR Sit normally and place your hands on either side of your belly and back or low ribs  If someone is nearby to help, they can place their hands on the sides of your back and ribs.   Slowly breathe in and feel your belly and back expand, like filling a balloon, pushing out against " "your waistband  Slowly breathe out on SHHHHH and feel your belly and back crunch inward, like zipping tight pants  Repeat slowly and take breaks if you get dizzy     HELPFUL HINTS:  -- Tie a string or piece of elastic around your low ribs when you get dressed. You'll be able to feel yourself expand against this as you breathe all day.   -- Some people find it easiest to practice while laying on their back or in a recliner, hands on their belly.  -- You can also lay on your stomach with your arms over your head to feel your back expand. It's not unusual for people to struggle noticing their back expanding to start with.      WHEN YOU COUGH:   Keep your sternum lifted  Make the abs crunch up and in, like someone punching you in the gut.   ____________________________________________________      COUGH STOPPER  1. Press your fist against your lips  2. Aim to balloon your cheeks and throat as you cough  3. ONLY breathe back in through your nose- inhaling through your mouth triggers more coughing  4. Keep repeating the balloon and sniff, balloon and sniff, until the spasm stops.     If you are willing, teach this to friends and family so that when you start coughing, someone next to you can say \"Make a fist! Balloon! Balloon!\"  OR put up post-it note reminders for yourself        Thank you and have a great day!  Rosendo Marmolejo. (voice) MNikolaiS., CCC-SLP  Speech-Language Pathologist  [facility]  [phone]  [email or web address]  Pronouns: she/her/hers  "

## 2024-02-23 ENCOUNTER — DOCUMENTATION ONLY (OUTPATIENT)
Dept: OTOLARYNGOLOGY | Facility: CLINIC | Age: 54
End: 2024-02-23

## 2024-02-23 ENCOUNTER — MYC MEDICAL ADVICE (OUTPATIENT)
Dept: SLEEP MEDICINE | Facility: CLINIC | Age: 54
End: 2024-02-23

## 2024-02-23 NOTE — PROGRESS NOTES
Copy of speech therapy order was faxed to Paulette العلي at [facility] vocational rehab. Fax number [phone]. 3 pgs faxed.

## 2024-02-27 ENCOUNTER — DOCUMENTATION ONLY (OUTPATIENT)
Dept: SLEEP MEDICINE | Facility: CLINIC | Age: 54
End: 2024-02-27
Payer: COMMERCIAL

## 2024-02-27 NOTE — PROGRESS NOTES
Medical opinion request was signed, filled out and faxed back to [employer] to the requesting party of  Sommer Chowdary at [phone].

## 2024-03-06 NOTE — CONFIDENTIAL NOTE
Completed paperwork has been re-faxed to [employer] at fax [phone]. Patient was sent a copy of completed paperwork via [employer].   Cindy Falk MA

## 2024-03-07 ENCOUNTER — VIRTUAL VISIT (OUTPATIENT)
Dept: SLEEP MEDICINE | Facility: CLINIC | Age: 54
End: 2024-03-07
Attending: INTERNAL MEDICINE
Payer: OTHER MISCELLANEOUS

## 2024-03-07 VITALS — WEIGHT: 210 LBS | BODY MASS INDEX: 30.06 KG/M2 | HEIGHT: 70 IN

## 2024-03-07 DIAGNOSIS — G47.33 OSA (OBSTRUCTIVE SLEEP APNEA): ICD-10-CM

## 2024-03-07 DIAGNOSIS — G47.21 CIRCADIAN RHYTHM SLEEP DISORDER, DELAYED SLEEP PHASE TYPE: ICD-10-CM

## 2024-03-07 DIAGNOSIS — F51.04 CHRONIC INSOMNIA: Primary | ICD-10-CM

## 2024-03-07 PROCEDURE — 90791 PSYCH DIAGNOSTIC EVALUATION: CPT | Mod: 95 | Performed by: PSYCHOLOGIST

## 2024-03-07 RX ORDER — LANSOPRAZOLE 30 MG/1
30 CAPSULE, DELAYED RELEASE ORAL
COMMUNITY
Start: 2023-07-31

## 2024-03-07 ASSESSMENT — SLEEP AND FATIGUE QUESTIONNAIRES
HOW LIKELY ARE YOU TO NOD OFF OR FALL ASLEEP WHILE LYING DOWN TO REST IN THE AFTERNOON WHEN CIRCUMSTANCES PERMIT: HIGH CHANCE OF DOZING
HOW LIKELY ARE YOU TO NOD OFF OR FALL ASLEEP WHILE SITTING INACTIVE IN A PUBLIC PLACE: WOULD NEVER DOZE
HOW LIKELY ARE YOU TO NOD OFF OR FALL ASLEEP WHILE WATCHING TV: WOULD NEVER DOZE
HOW LIKELY ARE YOU TO NOD OFF OR FALL ASLEEP WHILE SITTING AND TALKING TO SOMEONE: WOULD NEVER DOZE
HOW LIKELY ARE YOU TO NOD OFF OR FALL ASLEEP WHILE SITTING QUIETLY AFTER LUNCH WITHOUT ALCOHOL: WOULD NEVER DOZE
HOW LIKELY ARE YOU TO NOD OFF OR FALL ASLEEP WHEN YOU ARE A PASSENGER IN A CAR FOR AN HOUR WITHOUT A BREAK: WOULD NEVER DOZE
HOW LIKELY ARE YOU TO NOD OFF OR FALL ASLEEP IN A CAR, WHILE STOPPED FOR A FEW MINUTES IN TRAFFIC: WOULD NEVER DOZE
HOW LIKELY ARE YOU TO NOD OFF OR FALL ASLEEP WHILE SITTING AND READING: WOULD NEVER DOZE

## 2024-03-07 ASSESSMENT — PAIN SCALES - GENERAL: PAINLEVEL: NO PAIN (0)

## 2024-03-07 NOTE — NURSING NOTE
Is the patient currently in the state of [place]? YES    Visit mode:VIDEO    If the visit is dropped, the patient can be reconnected by: VIDEO VISIT: Send to e-mail at: [email or web address]    Will anyone else be joining the visit? NO  (If patient encounters technical issues they should call [phone])    How would you like to obtain your AVS? MyChart    Are changes needed to the allergy or medication list? Pt stated no changes to allergies and Pt stated no med changes    Reason for visit: Consult  Has patient had flu shot for current/most recent flu season? If so, when? NO  Sara RANDOLPH

## 2024-03-07 NOTE — PROGRESS NOTES
Virtual Visit Details    Type of service:  Video Visit     Originating Location (pt. Location): Home    Distant Location (provider location):  Off-site  Platform used for Video Visit: AmWell  Visit Start Time: 2:31 PM  Visit End Time:3:44 PM     SLEEP MEDICINE CONSULTATION  Sleep Psychology  Mar 7, 2024    Name: Aris Colon MRN# [id number]   Age: 53 year old YOB: 1970     Date of Consultation: Mar 7, 2024  Consultation is requested by: Alfredito Crawford MD  [address] [address]  Primary care provider: Mena Jauregui    Reason for Sleep Consultation     Airs Colon is a 53 year old male seen today for a behavioral sleep medicine consultation because of insomnia    Assessment and Plan     Sleep Diagnoses:            Chronic insomnia  TONO (obstructive sleep apnea)  Circadian rhythm sleep disorder, delayed sleep phase type, non organic    Co-occurring Conditions:         Diverticular Disorder       Polyps of Colon    Clinical Impressions:    Aris Colon was seen for a sleep psychology consultation and possible behavioral sleep intervention and treatment. History and clinical presentation of multifactorial insomnia associated with depression, stress, work-related injury with chronic congestion and cough, and a nonorganic delayed sleep phase circadian rhythm.  Other psychophysiologic features include insufficient sleep hygiene, particularly drinking caffeine later in the afternoon, use of computers later in the evening.  Elevated levels of stress, chronic cough and difficulty suppressing cough appear to be major factors inhibiting ability to initiate sleep adequately.  Active mind and worry in the evening also appear to contribute to difficulty with sleep initiation.  He is reporting that fluoxetine has been helpful in managing depression and anxiety.  This medication can contribute to insomnia.  He is taking  hydroxyzine near bedtime which may be helping to promote sleep to some degree.  However, this has moderate sleep apnea that clearly warrants treatment and is contributing to daytime fatigue, tiredness and insomnia.    Recommendations and Counselin.  Patient has a goal of gradually shifting and advancing his sleep schedule and phase from 9 AM to 6 AM.  This will need to be a gradual shift.  Initial goal will be to establish a consistent sleep schedule of 1 AM-9 AM.  2.  Derrick agreed to limit daytime napping to no more than 30 minutes nap taken between 2 PM-2:30 PM.  He will stop using caffeine after 2 PM.  3.  Derrick agreed to avoid using screens, computer or devices after 11 PM.  4.  Follow-up with sleep medicine and work with his Worker's Compensation insurance to obtain medically necessary treatment for obstructive sleep apnea.  5.  If patient is able to stabilize sleep pattern, gradually advance wake time from 9 AM to 6 AM by 1 hour each week.  He will also shift his evening schedule similarly, particularly with respect to avoiding use of screens and devices.  6.  Has unsuppressed cough is affecting ability to fall asleep and return to sleep in the middle the night, continue to follow-up his speech pathologist for breathing exercises and other strategies to manage cough.  He will also continue to follow with pulmonology for management of breathing related issues and lung issues.    Aris was provided information about the pathophysiology of insomnia, psychophysiological factors contributing to the onset and maintenance of insomnia and how co-occurring medical conditions and intrinsic sleep disorders can affect sleep.  Treatment options were discussed  as applicable to patient specific sleep concerns and symptoms. The benefits and potential early side effects of treatment including increased daytime sleepiness were discussed.     Patient was advised to consult with their prescribing provider around  use of or changes to prescription sleep medication.  Patient was counseled on the importance of avoiding driving if drowsy.    Services provided are compliant with the requirements of [place] Statute SS 256B.0625 Subd. 3b and paragraph (b)     Follow-up: 2 weeks     History of Present Sleep Complaint     Aris Colon is a 53 year old year old male who presents with onset of breathing and lung issues associated with work related exposure and injury to his lungs due to insufficient respirators per patient report.    He was evaluated by sleep medicine with subsequent diagnosis of moderately severe sleep apnea and recommendation for prescription for CPAP.  He indicates that he has [employer] he is working with and states that he is having difficulty getting approval for medically necessary treatment for sleep apnea.    Prior to his work-related injuries he reports that he typically A sleep schedule 10 PM-5:30 AM.  He describes himself as liking his work and sleep schedule.  However, with his health issues and lack of daytime structure, his sleep schedule has gravitated later, now to a wake time of 9 AM.  He also is finding that chronic coughing at night makes it hard for him to fall asleep and to return back to sleep in the middle the night.    He has become more concerned and anxious about his sleep.  He acknowledges that elevated stress related to his health issues lead to increased anxiety and rumination at night.  He is currently being treated for depression/anxiety by his primary care physician and takes fluoxetine, 20 mg in the morning.  He takes hydroxyzine, 10 mg at night.    Prescribed or OTC sleep medication: Hydroxyzine    Previous sleep medications patients has tried: None reported      SLEEP-WAKE SCHEDULE:     Shift Work - No  Source of Sleep Estimates:  Verbal Self-report  Today day 1 and day with day  Time to Bed:  varies, averages Midnight  Activity in Bed (stimulus control): none reported  Sleep  Latency: 30-60 minutes  Times awakened:  2  Total Time Awake After Sleep Onset: 30-45 minutes  Time Up for the Day: 9 AM  Total Time in Bed:  9 hours  Estimated Total Sleep Time: 7-7 0.5 hours  Sleep Efficiency Estimate: 80-85%    Naps:  Naps nearly every day for 30-60 minutes, naps taken between 3-4 PM    BEHAVIORS AFFECTING SLEEP:    Habits:     Uses computers or electronics within an hour before bedtime, Worries or thinks about sleep during the day, Experiences excessive worry or rumination in the evening or in the middle of the night    Sleep Environment:     Bedroom is quiet, comfortable and dark, Sleeps alone    Substance Use:      Caffeine: 1-2 beverages   Alcohol: None reported  Nicotine: None  Other substances:: None reported    Family History of Sleep Disorders:    None reported    SCALES      EPWORTH SLEEPINESS SCALE    Likeliness of dozing or falling  asleep:    Sitting and reading: Would never doze    Watching TV: Would never doze    Sitting, inactive in a public place (e.g. a theatre or a meeting): Would never doze    As a passenger in a car for an hour without a break: Would never doze    Lying down to rest in the afternoon when circumstances permit: High chance of dozing    Sitting and talking to someone: Would never doze    Sitting quietly after a lunch without alcohol: Would never doze    In a car, while stopped for a few minutes in traffic: Would never doze    Total score - [place]: 3      INSOMNIA SEVERITY INDEX (MARÍA ELENA)      The Insomnia Severity Index measures the severity of insomnia symptoms over the past two weeks.    1. Difficulty falling asleep: Very Severe    2. Difficulty staying asleep: Severe    3. Problems waking up too early: Severe    4. How SATISFIED/DISSATISFIED are youwith your CURRENT sleep pattern? Very Dissatisfied    5. How NOTICEABLE to others do you think your sleep problem is in terms of impairing the quality of your life? Somewhat      6. How WORRIED/DISTRESSED are you about  your sleep problem? Very Much Worried    7. To what extent do you consider your sleep problem to INTERFERE with your daily functioning (e.g. daytime fatigue, mood, ability to functon at work/daily chores, concentration, memory, mood, etc.) CURRENTLY?   Very Much Interfering     MARÍA ELENA Total Score: 24    Guidelines for Scoring/Interpretation:   0-7 = No clinically significant insomnia   8-14 = Subthreshold insomnia   15-21 = Clinical insomnia (moderate severity)  22-28 = Clinical insomnia (severe)          Previous Sleep Consultations/Studies     12/11/2023 [place] [facility] Sleep Study (205.0 lbs) - AHI 24.5, RDI 31.4, Supine AHI 57.3, REM AHI 1.4, Low O2 81.0%, Time Spent ?88% 5.2 minutes / Time Spent ?89% 7.9 minutes    Diagnostic Codes: Obstructive Sleep Apnea G47.33 Sleep Hypoxemia/Hypoventilation G47.36 Periodic Limb Movement Disorder G47.61 Repetitive Intrusions Into Sleep F51.8   Vitals     There were no vitals taken for this visit.     Medical History     Allergies:    No Known Allergies    Medications:    Current Outpatient Medications   Medication Sig Dispense Refill    albuterol (PROAIR HFA/PROVENTIL HFA/VENTOLIN HFA) 108 (90 Base) MCG/ACT inhaler 2 puffs every 4 hours as needed      benzonatate (TESSALON) 100 MG capsule Take 1 capsule (100 mg) by mouth 3 times daily as needed for cough 30 capsule 4    FLUoxetine (PROZAC) 20 MG capsule       fluticasone-vilanterol (BREO ELLIPTA) 100-25 MCG/ACT inhaler       hydrOXYzine (ATARAX) 10 MG tablet       pantoprazole (PROTONIX) 40 MG EC tablet Take 1 tablet (40 mg) by mouth 2 times daily 180 tablet 2    sildenafil (REVATIO) 20 MG tablet TAKE 1-5 TABLETS BY MOUTH 30 MINS TO 4 HOURS BEFORE SEXUAL ACTIVITY      tiotropium (SPIRIVA RESPIMAT) 2.5 MCG/ACT inhaler Inhale 2 puffs into the lungs         Problem List:  There are no problems to display for this patient.       Past Medical/Surgical History:  No past medical history on file.  There are no problems to display  for this patient.    There is no problem list on file for this patient.       Most Recent Labs:  No results found for any previous visit.     Mental Health History     Prior Mental Health Diagnosis:   Depression    Mental Health Treatment:   Primary care medication management, ; Fluoxetine, hydroxyzine    Chemical Abuse/Treatment:    None reported        Family History     No family history on file.    Social History     Social History     Socioeconomic History    Marital status:    Tobacco Use    Smoking status: Never    Smokeless tobacco: Never            Mental Status Examination     Aris presented as oriented X3 with speech and language intact.  The patient was cooperative throughout the evaluation with no signs of hallucinations, delusions, loosening of associations or other thought disturbance.  Mood was normal Affect was congruent with mood. Insight and judgement were intact.  Memory appeared intact for recent and remote elements.  There was no report of suicidal ideation, intention or plan. Attention and concentration were within normal.        Bob Ruvalcaba PsyD, LP, [facility]  Diplomate, Behavioral Sleep Medicine  [facility]      Copy:   Clinic, Mena Crawford MD  [address] [address]    Note: This dictation was created using voice recognition software. This document may contain an error not identified before finalizing the document. If the error changes the accuracy of the document, I would appreciate it being brought to my attention.

## 2024-03-07 NOTE — PATIENT INSTRUCTIONS
1.  Your target goal is that of shifting and advancing your sleep schedule and phase from a wake time of 9 AM to 6 AM.  This will need to be a gradual shift.  Initial goal will be to establish a consistent sleep schedule of 1 AM-9 AM.  2.  Lmit daytime napping to no more than 30 minutes nap taken between 2 PM-2:30 PM.  Stop using caffeine after 2 PM.  3.  Avoid using screens, computer or devices after 11 PM.  4.  Follow-up with sleep medicine and work with Worker's Compensation insurance to obtain medically necessary treatment for obstructive sleep apnea.  5.  If you are able to stabilize sleep pattern, we will gradually advance your wake time from 9 AM to 6 AM by 1 hour each week.  You will also need to shift his evening schedule similarly, particularly with respect to avoiding use of screens and devices.  6.  Your unsuppressed cough is clearly affecting your  ability to fall asleep and return to sleep in the middle the night. Continue to follow-up your speech pathologist for breathing exercises and other strategies to manage cough.  Continue to follow with primary care and or pulmonology for management of lung and breathing issues.

## 2024-03-19 ENCOUNTER — VIRTUAL VISIT (OUTPATIENT)
Dept: SLEEP MEDICINE | Facility: CLINIC | Age: 54
End: 2024-03-19
Payer: OTHER MISCELLANEOUS

## 2024-03-19 VITALS — HEIGHT: 70 IN | WEIGHT: 210 LBS | BODY MASS INDEX: 30.06 KG/M2

## 2024-03-19 DIAGNOSIS — G47.21 CIRCADIAN RHYTHM SLEEP DISORDER, DELAYED SLEEP PHASE TYPE: ICD-10-CM

## 2024-03-19 DIAGNOSIS — G47.33 OSA (OBSTRUCTIVE SLEEP APNEA): ICD-10-CM

## 2024-03-19 DIAGNOSIS — F51.04 CHRONIC INSOMNIA: Primary | ICD-10-CM

## 2024-03-19 PROCEDURE — 90832 PSYTX W PT 30 MINUTES: CPT | Mod: 95 | Performed by: PSYCHOLOGIST

## 2024-03-19 ASSESSMENT — SLEEP AND FATIGUE QUESTIONNAIRES
HOW LIKELY ARE YOU TO NOD OFF OR FALL ASLEEP WHILE LYING DOWN TO REST IN THE AFTERNOON WHEN CIRCUMSTANCES PERMIT: HIGH CHANCE OF DOZING
HOW LIKELY ARE YOU TO NOD OFF OR FALL ASLEEP WHILE SITTING QUIETLY AFTER LUNCH WITHOUT ALCOHOL: MODERATE CHANCE OF DOZING
HOW LIKELY ARE YOU TO NOD OFF OR FALL ASLEEP WHILE SITTING AND TALKING TO SOMEONE: WOULD NEVER DOZE
HOW LIKELY ARE YOU TO NOD OFF OR FALL ASLEEP IN A CAR, WHILE STOPPED FOR A FEW MINUTES IN TRAFFIC: WOULD NEVER DOZE
HOW LIKELY ARE YOU TO NOD OFF OR FALL ASLEEP WHILE SITTING INACTIVE IN A PUBLIC PLACE: WOULD NEVER DOZE
HOW LIKELY ARE YOU TO NOD OFF OR FALL ASLEEP WHILE SITTING AND READING: HIGH CHANCE OF DOZING
HOW LIKELY ARE YOU TO NOD OFF OR FALL ASLEEP WHILE WATCHING TV: WOULD NEVER DOZE
HOW LIKELY ARE YOU TO NOD OFF OR FALL ASLEEP WHEN YOU ARE A PASSENGER IN A CAR FOR AN HOUR WITHOUT A BREAK: SLIGHT CHANCE OF DOZING

## 2024-03-19 ASSESSMENT — PAIN SCALES - GENERAL: PAINLEVEL: NO PAIN (0)

## 2024-03-19 NOTE — NURSING NOTE
PHQ9 - total not complete due some questions not being answered by patient.     Depression Response    Patient completed the PHQ-9 assessment for depression and scored >9? Yes  Question 9 on the PHQ-9 was positive for suicidality? No  Does patient have current mental health provider? No    Is this a virtual visit? Yes   Does patient have suicidal ideation (positive question 9)? No - offer to place Mental Health Referral.  Patient declined referral/not needed    I personally notified the following: visit provider      Has patient had flu shot for current/most recent flu season? If so, when? No        Is the patient currently in the state of [place]? YES    Visit mode:VIDEO    If the visit is dropped, the patient can be reconnected by: VIDEO VISIT: Send to e-mail at: [email or web address]    Will anyone else be joining the visit? NO  (If patient encounters technical issues they should call [phone])    How would you like to obtain your AVS? MyChart    Are changes needed to the allergy or medication list? No    Reason for visit: RECHECK    Marcie RANDOLPH

## 2024-03-19 NOTE — PROGRESS NOTES
Virtual Visit Details    Type of service:  Video Visit     Originating Location (pt. Location): Home    Distant Location (provider location):  Off-site  Platform used for Video Visit: AmWell    Visit Start Time: 4:10 PM  Visit End Time: 4:34 PM        SLEEP MEDICINE VIRTUAL FOLLOW-UP VISIT  Sleep Psychology    Patient Name: Aris Colon  MRN:  [id number]  Date of Service: Mar 19, 2024       Subjective Report     Aris Colon  returns for a telehealth video visit to discuss progress in implementing behavioral strategies for the management of insomnia.  Patient consent for initiation of video visit was obtained and documented prior to initiation of visit.     Aris reports he has been trying to implement the 1-9 am sleep schedule but finds he has difficulty falling asleep.  When he does fall asleep he has not been able to fall asleep long.  I experiences considerable burning in chest and throat it causes him to wake up.  He will get a glass of water and goes back to sleep but usually only for about 30 minutes.    HE repoits he finds it difficult to get up at 9 am because he feels so fatigued due to.multiple interruptions of sleep throughout the night.      He has cut down screen use in the later night and early morning but is not seeing it affect or improve his ability to fall asleep.    Derrick is working with his [facility] to obtain and secure approved referrals.    He has not yet been able to secure the documentation to obtain his CPAP equipment.     .Initial Consult Recommendations:    1.  Patient has a goal of gradually shifting and advancing his sleep schedule and phase from 9 AM to 6 AM.  This will need to be a gradual shift.  Initial goal will be to establish a consistent sleep schedule of 1 AM-9 AM.  2.  Derrick agreed to limit daytime napping to no more than 30 minutes nap taken between 2 PM-2:30 PM.  He will stop using caffeine after 2 PM.  3.  Derrick agreed to avoid using screens,  computer or devices after 11 PM.  4.  Follow-up with sleep medicine and work with his Worker's Compensation insurance to obtain medically necessary treatment for obstructive sleep apnea.  5.  If patient is able to stabilize sleep pattern, gradually advance wake time from 9 AM to 6 AM by 1 hour each week.  He will also shift his evening schedule similarly, particularly with respect to avoiding use of screens and devices.  6.  Has unsuppressed cough is affecting ability to fall asleep and return to sleep in the middle the night, continue to follow-up his speech pathologist for breathing exercises and other strategies to manage cough.  He will also continue to follow with pulmonology for management of breathing related issues and lung issues.         Sleep Data:     Source of Sleep Estimates:  Verbal Self-report      Sitting and reading 3   Watching TV 0   Sitting, inactive in a public place (theatre or mtg.) 0    As a passenger in a car 1   Lying down to rest in the afternoon when circumstance permit 3   Sitting and talking to someone 0   Sitting quietly after lunch without alcohol 2   In a car, while stopped for a few minutes in traffic 0   TOTAL SCORE (nl <11) 9     INSOMNIA SEVERITY INDEX     Difficulty falling asleep 3   Difficult staying asleep 3   Problems waking up to early 3   How SATISFIED/DISSATISFIED are you with your CURRENT sleep pattern? 3   How NOTICEABLE to others do you think your sleep pattern is in terms of your quality of life? 2   How WORRIED/DISTRESSED are you about your current sleep pattern? 4   To what extent do you consider your sleep problem to INTERFERE with your daily fuctioning(e.g. daytime fatigue, mood, ability to function at work/daily chores, concentration, mood,etc.) CURRENTLY? 4   INSOMNIA SEVERITY INDEX TOTAL SCORE 22    Absence of insomnia (0-7); sub-threshold insomnia (8-14); moderate insomnia (15-21); and severe insomnia (22-28)       Interventions     Strategies and  "recommendations including  care coordination, sleep hygiene, coping with and managing health related and breathing related impact on sleep, delayed sleep phase strategies  were reviewed and discussed today.     Services provided are compliant with the requirements of [place] Statute SS 256B.0625 Subd. 3b and paragraph (b)       Vital Signs     Ht 1.778 m (5' 10\")   Wt 95.3 kg (210 lb)   BMI 30.13 kg/m       Mental Status     Orientation:  X3  Mood:  normal  Affect:  Congruent with mood  Speech/Language:  Normal  Thought Process: Intact  Associations:  Normal  Thought Content: Normal  Patient does not report any suicidal ideation, intention or plan.    Diagnostic Impressions and Plan        Chronic insomnia  Circadian rhythm sleep disorder, delayed sleep phase type  TONO (obstructive sleep apnea)      No significant improvement in sleep the patient has made efforts to maintain recommended sleep schedule.  Barriers to doing so include frequent coughing and health related discomfort affecting sleep maintenance.  The result is sleep deprivation that affects maintenance of alertness.  He also has not been able to get approval for documentation to secure CPAP therapy and treatment for sleep apnea.,    Plan:  Continue current sleep schedule and plan, continue to work with [facility] to coordinate care and secure resources to obtain CPAP for treatment of sleep apnea.  Continue to work with [facility] to obtain referral for psychotherapy to address the profound stress and coping with his condition    Follow-up: as needed      Bob Ruvalcaba PsyD, OVI, [facility]  Diplomate, Behavioral Sleep Medicine  [facility]      Note: This dictation was created using voice recognition software. This document may contain an error not identified before finalizing the document. If the error changes the accuracy of the document, I would appreciate it being brought to my attention.                               "

## 2024-03-21 ENCOUNTER — OFFICE VISIT (OUTPATIENT)
Dept: OTOLARYNGOLOGY | Facility: CLINIC | Age: 54
End: 2024-03-21
Payer: OTHER MISCELLANEOUS

## 2024-03-21 ENCOUNTER — TELEPHONE (OUTPATIENT)
Dept: OTOLARYNGOLOGY | Facility: CLINIC | Age: 54
End: 2024-03-21
Payer: COMMERCIAL

## 2024-03-21 ENCOUNTER — MYC MEDICAL ADVICE (OUTPATIENT)
Dept: SLEEP MEDICINE | Facility: CLINIC | Age: 54
End: 2024-03-21
Payer: COMMERCIAL

## 2024-03-21 DIAGNOSIS — R05.3 CHRONIC COUGH: Primary | ICD-10-CM

## 2024-03-21 PROCEDURE — 92507 TX SP LANG VOICE COMM INDIV: CPT | Mod: GN | Performed by: SPEECH-LANGUAGE PATHOLOGIST

## 2024-03-21 NOTE — TELEPHONE ENCOUNTER
"Provider returned phone call request from patient, who stated he had been coughing much worse this afternoon and hacking up mucus/phlegm. Vomiting seemed to be the only thing that helped him feel better. We discussed that the newly introduced voice exercises today can help loosen phlegm and mucus, so it's not unusual to have a more productive cough after practicing them. We also discussed the likelihood that his new voice exercises may worsen his cough temporarily since it's a new sensation for his throat and it will take some time and practice for him to fully learn the exercises. Over time, the voice exercises should cause less coughing, and we can then progress to the next level, which may again exacerbate symptoms temporarily.     He also stated that his \"suck in\" technique wasn't helping to stop the cough. We discussed the importance of rounded, loosely pursed lips with a silent inhalation, which he reports he may not have been doing correctly. Patient's coughing episodes decreased notably during our conversation, with this added detail. He verbalized his intense frustration and how tired he is of not being able to live a normal life or be the person he used to be before his cough developed. We had previously discussed him seeing healthy psychology, but he states his insurance wants some details changed in the letter they received from Dr. De Paz. I will reach out to see if we can provide the information needed for approval, as Dr. De Paz is on family leave until mid-June and health psychology would be highly beneficial for Derrick.     Rosendo Landa. (voice), M.S., CCC-SLP  Speech-Language Pathologist  [facility]  [phone]  [email or web address]  Pronouns: she/her/hers    "

## 2024-03-21 NOTE — LETTER
"3/21/2024       RE: Aris Colon  [address] [place] [id number]     Dear Colleague,    Thank you for referring your patient, Aris Colon, to the [facility] [place] [facility]. Please see a copy of my visit note below.    [facility]  PROGRESS REPORT (CPT 73108)    Patient: Aris Colon  Date of Service: 3/21/2024  Date of Last Service: 2/22/24  Number of Visits: 7/18 approved  Referring Physician: Dr. Kathy De Paz    Impressions from evaluation on 11/17/23 by Mark Dutta M.S., CCC-SLP:  Derrick Colon presents today with ongoing chronic cough which began in conjunction with workplace particle exposure, and with recent exacerbation including hemoptysis and emesis.  Laryngeal evaluation today was warranted given recent episodes of hemoptysis to ensure the patient was not recovering from hemorrhagic or ulcerative event.  This demonstrated no acute trauma which is fortunate, though diffuse change to the vocal folds and surrounding tissues from longstanding cough was seen consistent with previous evaluation if slightly worsened.  Cough response was aggressive to the application of oxymetazoline nasal spray, and guarding and light cough was noted to occur even prior to the administration, which underscores both the hypersensitivity as well as the stress base response to triggers that has developed over time.  Within the exam itself patient was able to attenuate cough through focused relaxation which bodes well for his ability to incorporate therapeutic strategies to reduce cough, though as we discussed full resolution of symptoms is often challenging due to their multifactorial nature especially in the case of irritant exposure.      SUBJECTIVE:  Since Derrick's last session, he reports the following:   His cough has been \"hit and miss.\"   He gets frustrated with himself when he tries " "to use his strategies, but he isn't able to get the timing right during coughing.   He had a sleep study done yesterday, but he eventually had to stop talking entirely because he couldn't stop coughing.  Talking continues to be a big trigger for his cough, though odors/scents are the worst trigger.   He still vomits about 4x/week. He hasn't vomited any blood in over a month.   He has not been to the emergency room in the past month.  He struggles to stop coughing long enough to do his voice/bubble exercises    OBJECTIVE:  Patient Specific Goal Metrics:      12/29/2023    10:00 AM 1/25/2024    11:00 AM 3/21/2024    12:00 PM   Dysponia SLP Goals   How severe is your cough /throat clearing, if 0 is no cough at all and 10 is the worst cough? 10 10 7   How much does your cough/throat-clearing problem bother you?            Very Much Very Much Quite a bit       THERAPEUTIC ACTIVITIES  Today Derrick participated in the following therapeutic activities:  Counseling and Education:  Asked questions about the nature of his symptoms, and I answered all of these thoroughly.    I provided Derrick with explanation and skilled instruction of:  Exercises to coordinate phonation with optimal flowing airstream and reduce phonatory impact.   Semi-occluded vocal tract exercises with straw and 1-1.5\" of water in a cup bubbles were most facilitating  He progressed through a simplified warm-up level of phonatory complexity  Instructed to use as a voice warm up, cool down, coordination of breath flow with phonation, and for tissue mobilization.  Specific modifications instructed included focus on rounded lips and chest lighted  Patient demonstrated good learning, but will need practice and additional reinforcement  Ongoing instruction and reinforcement of sip inhalation and ballooned cheeks to stop a cough.   He had 6 coughing episodes during today's session, reduced from non-stop cough at the beginning of his course of therapy.   Of note, he " was able to rapidly decrease 5/6 episodes with use of the sip and balloon technique, though he benefits from verbal/visual cues.     Instruction of Home Practice:  A revised regimen for home practice was instructed.  I provided an AVS of important notes of today's therapeutic activities to facilitate practice.    ASSESSMENT/PLAN  Progress toward long-term goals:  Adequate but incomplete progress; please see above    Impressions:  IMPRESSIONS: chronic cough   Derrick had a productive session of therapy today, working on SOVT tasks and ongoing cough reduction.  He will continue to work on his exercises on a daily basis, and work on incorporating the techniques into his daily activities. Speech therapy continues to be medically necessary for Derrick to participate fully and engage in activities of daily living.     Plan:   I will see Derrick in 2 weeks and will plan to continue reinforcing chest opening, straw sip, and balloon exhale with coughing. Progress pitch glides with pursed lips/ballooned cheeks to longer productions and flow phonation. Add chest release. Possibly reinstruct neck release strategies as able. Keep count of coughing fits (3/21: 6). Make visual cue for lock screen. Complete CSI.    For home practice goals, see AVS.     TOTAL SERVICE TIME: 60 minutes  TREATMENT (57316)    Milagros Landa (voice) MNikolaiS., CCC-SLP  Speech-Language Pathologist  [facility]  [phone]  [email or web address]  Pronouns: she/her/hers      *this report was created in part through the use of computerized dictation software, and though reviewed following completion, some typographic errors may persist.  If there is confusion regarding any of this notes contents, please contact me for clarification    OBJECTIVE FINDINGS  PATIENT REPORTED MEASURES  Patient Supplied Answers To Last 2 VHI Questionnaires       No data to display                   Patient Supplied Answers To Last 2 CSI Questionnaires       No data to  display                   Patient Supplied Answers To Last 2 EAT Questionnaires       No data to display                  Patient Supplied Answers to Dyspnea Index Questionnaire:       No data to display                   Again, thank you for allowing me to participate in the care of your patient.      Sincerely,    Ora Shepard, SLP

## 2024-03-21 NOTE — PROGRESS NOTES
"[facility]ICE [facility]IC  PROGRESS REPORT (CPT [id number])    Patient: Aris Colon  Date of Service: 3/21/2024  Date of Last Service: 2/22/24  Number of Visits: 7/18 approved  Referring Physician: Dr. Kathy De Paz    Impressions from evaluation on 11/17/23 by Mark Dutta M.S., CCC-SLP:  Derrick Colon presents today with ongoing chronic cough which began in conjunction with workplace particle exposure, and with recent exacerbation including hemoptysis and emesis.  Laryngeal evaluation today was warranted given recent episodes of hemoptysis to ensure the patient was not recovering from hemorrhagic or ulcerative event.  This demonstrated no acute trauma which is fortunate, though diffuse change to the vocal folds and surrounding tissues from longstanding cough was seen consistent with previous evaluation if slightly worsened.  Cough response was aggressive to the application of oxymetazoline nasal spray, and guarding and light cough was noted to occur even prior to the administration, which underscores both the hypersensitivity as well as the stress base response to triggers that has developed over time.  Within the exam itself patient was able to attenuate cough through focused relaxation which bodes well for his ability to incorporate therapeutic strategies to reduce cough, though as we discussed full resolution of symptoms is often challenging due to their multifactorial nature especially in the case of irritant exposure.      SUBJECTIVE:  Since Derrick's last session, he reports the following:   His cough has been \"hit and miss.\"   He gets frustrated with himself when he tries to use his strategies, but he isn't able to get the timing right during coughing.   He had a sleep study done yesterday, but he eventually had to stop talking entirely because he couldn't stop coughing.  Talking continues to be a big trigger for his cough, though odors/scents are the worst trigger.   He still vomits about 4x/week. He " "hasn't vomited any blood in over a month.   He has not been to the emergency room in the past month.  He struggles to stop coughing long enough to do his voice/bubble exercises    OBJECTIVE:  Patient Specific Goal Metrics:      12/29/2023    10:00 AM 1/25/2024    11:00 AM 3/21/2024    12:00 PM   Dysponia SLP Goals   How severe is your cough /throat clearing, if 0 is no cough at all and 10 is the worst cough? 10 10 7   How much does your cough/throat-clearing problem bother you?            Very Much Very Much Quite a bit       THERAPEUTIC ACTIVITIES  Today Derrick participated in the following therapeutic activities:  Counseling and Education:  Asked questions about the nature of his symptoms, and I answered all of these thoroughly.    I provided Derrick with explanation and skilled instruction of:  Exercises to coordinate phonation with optimal flowing airstream and reduce phonatory impact.   Semi-occluded vocal tract exercises with straw and 1-1.5\" of water in a cup bubbles were most facilitating  He progressed through a simplified warm-up level of phonatory complexity  Instructed to use as a voice warm up, cool down, coordination of breath flow with phonation, and for tissue mobilization.  Specific modifications instructed included focus on rounded lips and chest lighted  Patient demonstrated good learning, but will need practice and additional reinforcement  Ongoing instruction and reinforcement of sip inhalation and ballooned cheeks to stop a cough.   He had 6 coughing episodes during today's session, reduced from non-stop cough at the beginning of his course of therapy.   Of note, he was able to rapidly decrease 5/6 episodes with use of the sip and balloon technique, though he benefits from verbal/visual cues.     Instruction of Home Practice:  A revised regimen for home practice was instructed.  I provided an AVS of important notes of today's therapeutic activities to facilitate " practice.    ASSESSMENT/PLAN  Progress toward long-term goals:  Adequate but incomplete progress; please see above    Impressions:  IMPRESSIONS: chronic cough   Derrick had a productive session of therapy today, working on SOVT tasks and ongoing cough reduction.  He will continue to work on his exercises on a daily basis, and work on incorporating the techniques into his daily activities. Speech therapy continues to be medically necessary for Derrick to participate fully and engage in activities of daily living.     Plan:   I will see Derrick in 2 weeks and will plan to continue reinforcing chest opening, straw sip, and balloon exhale with coughing. Progress pitch glides with pursed lips/ballooned cheeks to longer productions and flow phonation. Add chest release. Possibly reinstruct neck release strategies as able. Keep count of coughing fits (3/21: 6). Make visual cue for lock screen. Complete CSI.    For home practice goals, see AVS.     TOTAL SERVICE TIME: 60 minutes  TREATMENT ([id number])    Milagros Landa (voice), M.S., CCC-SLP  Speech-Language Pathologist  [facility]  [phone]  [email or web address]  Pronouns: she/her/hers      *this report was created in part through the use of computerized dictation software, and though reviewed following completion, some typographic errors may persist.  If there is confusion regarding any of this notes contents, please contact me for clarification    OBJECTIVE FINDINGS  PATIENT REPORTED MEASURES  Patient Supplied Answers To Last 2 VHI Questionnaires       No data to display                   Patient Supplied Answers To Last 2 CSI Questionnaires       No data to display                   Patient Supplied Answers To Last 2 EAT Questionnaires       No data to display                  Patient Supplied Answers to Dyspnea Index Questionnaire:       No data to display

## 2024-03-21 NOTE — PATIENT INSTRUCTIONS
"Hello!     Please complete the exercises below and remember, a few minutes of practice many times throughout the day is more important than one large practice session. If you have any questions about your strategies, feel free to contact me at [email or web address] or via [employer]. Please call [phone] for scheduling alterations or to be seen sooner in case of cancellations.      Remember that your new goal is to KEEP THE AIR IN!!! Getting everything out leads to compression of your body, which is what makes you vomit and keep coughing.     Home Exercise Program:  1. Shoulders back/chest lifted!  2. Sip in (rounded lips!)  3. Balloon (blow gently out with ballooned cheeks)  4. Repeat as needed, even if you're only getting little snippets of the exercise between coughs.      VOICE EXERCISES (1x/hour)  -- Inhale SILENTLY and feel your belly fill with air  -- Slowly exhale as your belly contracts, ROUND \"OO\" LIPS  -- Large Straw and 1 inch of water in a cup  -- \"Whoooooo\" (smooth bubbles like a motor boat or a simmering pot of water)  5x silently with just air for 5-7 seconds  5x short and easy sighs on \"whoooo\" with bubbles  -- Double-check that your LIPS are ROUNDED, chest lifted        ____________________________________________________     OLDER NOTES BELOW:      SILENT BREATHING STRATEGIES   These help to relax the throat and keep things calm. Noise, particularly higher pitch noise, as you inhale means you've likely tightened somewhere. The more noisy and tight your inhalation is, the more likely you'll start coughing.      Pick 1 inhalation and 1 exhalation, then repeat back and forth until your throat feels calmer.   INHALATION: EXHALATION:   1. Tongue tuck  2. Sniff  3. Puppy Yawn  4. Straw  5. Rounded lip  6. Lip Whistle or Kazoo 1. Tongue tuck  2. Ballooned cheeks  3. Fist balloon  4. Shhhhhhh  5. Sh sh sh sh sh...  6. Lip Whistle or Kazoo      Adding Voice:  For 2-3 minutes, here and there " "throughout the day, use your lip whistle or kazoo:   1. Inhale silently through the lip whistle or kazoo  2. Gently sigh out, letting air flow through the lip whistle or kazoo, adding your voice on \"Whoooooo\"  3. Play with pitch glides, gliding down, gliding up, gliding up and down like a siren  4. Double check that you hear the buzz/spin of the lip whistle/kazoo      ____________________________________________________     Sternocleidomastoid  1. Your SCM, or sternocleidomastoid, is the muscle that attaches to the bony bump behind each each and courses downward and slightly forward along your neck, attaching at the other end to your clavicle and sternum. If you turn your head to the side, you can feel the SCM muscle sticking out.   2. With your head facing forward, use the flat part of your thumb and pointer to gently pinch, knead, and massage the SCM on ONE SIDE at a time.   3. You can gently pinch and hold the SCM, or rub your thumb and pointer back and forth to knead the SCM, or gently jiggle the SCM forward and backward. Eventually add deep inhalations or sighs so that your SCM learns to also relax in the context of speaking/singing.   4. Now switch to the other side and repeat.   5. Remember that you never want to use more than 3 or 4/10 intensity. It's better to be gentle and repeat this strategy several times a day than to be aggressive and cause the SCM to tense up more in response to discomfort. If you get dizzy or feel a strong pulse, please move your finger or wait until we can review this.      Lateral Laryngeal Stretch:  1. Position your hands like praying, then rotate both sets of fingers toward yourself and place them along each side of your marco's apple. OR, place your thumb and two fingers on each side of your marco's apple.   2. Gentle shift your marco's apple side-to-side, only about a centimeter in each direction.   3. Decide which direction feels easier.   4. Shift your marco's apple about a " "centimeter in the easier direction and hold, breathing slow, deep breaths for about 120 seconds.   5. Shift the other direction and hold, breathing slow, deep breaths for about 120 seconds.      Downward Laryngeal Stretch:  1. Gently find the groove at the top of your marco's apple, then follow the groove to each side and place a thumb and pointer finger on each side of your marco's apple, within the slight indented groove along the top of each side.   2. Inhale slowly with a yawny sensation, gently pulling downward along your marco's apple (Don't pinch hard, just enough to \"suggest\" a downward movement).   3. Reset your fingers at the top of your larynx and repeat several times.   4. Always be gentle- don't use enough pressure that you would bruise a peach or banana if you were holding one between your fingers.   5. After repeating 3-5 times, add a gentle sigh as you exhale \"Huuuhhhhh\"   ____________________________________________________     BELLY BREATHING  Stretch: (each morning)  Stretch your arms up and take two slow, deep breaths, feeling your rib cage lift and stretch.    Then lean toward each side, taking 2 slow, deep breaths on each side, feeling your ribcage lift and expand along the sides.   Now leaning back, arching your back to feel a stretch along the front of your ribs, and take 2 more slow, deep breaths.    Lastly, lean forward and allow your back and neck to stretch and take 2 slow, deep breaths.      Practice: (5 breaths per hour or 10 breaths at each meal)  Sit hinged forward with your elbows on your knees. OR Sit normally and place your hands on either side of your belly and back or low ribs  If someone is nearby to help, they can place their hands on the sides of your back and ribs.   Slowly breathe in and feel your belly and back expand, like filling a balloon, pushing out against your waistband  Slowly breathe out on [facility] and feel your belly and back crunch inward, like zipping tight " "pants  Repeat slowly and take breaks if you get dizzy     HELPFUL HINTS:  -- Tie a string or piece of elastic around your low ribs when you get dressed. You'll be able to feel yourself expand against this as you breathe all day.   -- Some people find it easiest to practice while laying on their back or in a recliner, hands on their belly.  -- You can also lay on your stomach with your arms over your head to feel your back expand. It's not unusual for people to struggle noticing their back expanding to start with.      WHEN YOU COUGH:   Keep your sternum lifted  Make the abs crunch up and in, like someone punching you in the gut.   ____________________________________________________      COUGH STOPPER  1. Press your fist against your lips  2. Aim to balloon your cheeks and throat as you cough  3. ONLY breathe back in through your nose- inhaling through your mouth triggers more coughing  4. Keep repeating the balloon and sniff, balloon and sniff, until the spasm stops.     If you are willing, teach this to friends and family so that when you start coughing, someone next to you can say \"Make a fist! Balloon! Balloon!\"  OR put up post-it note reminders for yourself        Thank you and have a great day!  Rosendo Marmolejo. (voice), M.S., CCC-SLP  Speech-Language Pathologist  [facility]  [phone]  [email or web address]  Pronouns: she/her/hers  "

## 2024-03-25 NOTE — CONFIDENTIAL NOTE
Left a message for claims staff Sommer from [employer] to contact the sleep clinic, Dr. Pang is requesting blank claims form be sent to sleep clinic, patient is asking Dr. Pang to revise previous form.  Sleep Center phone and fax number left for Sommer to contact our office.   Cindy Falk MA

## 2024-04-04 ENCOUNTER — OFFICE VISIT (OUTPATIENT)
Dept: OTOLARYNGOLOGY | Facility: CLINIC | Age: 54
End: 2024-04-04
Payer: OTHER MISCELLANEOUS

## 2024-04-04 DIAGNOSIS — R05.3 CHRONIC COUGH: Primary | ICD-10-CM

## 2024-04-04 PROCEDURE — 92507 TX SP LANG VOICE COMM INDIV: CPT | Mod: GN | Performed by: SPEECH-LANGUAGE PATHOLOGIST

## 2024-04-04 NOTE — LETTER
4/4/2024       RE: Aris Colon  [address] [place] [id number]     Dear Colleague,    Thank you for referring your patient, Aris Colon, to the [facility] VOICE CLINIC [place] at [facility]. Please see a copy of my visit note below.    [facility]  PROGRESS REPORT (CPT [id number])    Patient: Aris Colon  Date of Service: 4/4/2024  Date of Last Service: 3/21/24  Number of Visits: 8/18 approved  Referring Physician: Dr. Kathy De Paz    Impressions from evaluation on 11/17/23 by Mark Dutta M.S., CCC-SLP:  Derrick Colon presents today with ongoing chronic cough which began in conjunction with workplace particle exposure, and with recent exacerbation including hemoptysis and emesis.  Laryngeal evaluation today was warranted given recent episodes of hemoptysis to ensure the patient was not recovering from hemorrhagic or ulcerative event.  This demonstrated no acute trauma which is fortunate, though diffuse change to the vocal folds and surrounding tissues from longstanding cough was seen consistent with previous evaluation if slightly worsened.  Cough response was aggressive to the application of oxymetazoline nasal spray, and guarding and light cough was noted to occur even prior to the administration, which underscores both the hypersensitivity as well as the stress base response to triggers that has developed over time.  Within the exam itself patient was able to attenuate cough through focused relaxation which bodes well for his ability to incorporate therapeutic strategies to reduce cough, though as we discussed full resolution of symptoms is often challenging due to their multifactorial nature especially in the case of irritant exposure.     SUBJECTIVE:  Since Derrick's last session, he reports the following:   He finds it very frustrating to try to sip in when he's coughing out, but sometimes  "he's able to do it and stop the cough.   His cough and getting phlegm up has been flared up since two weeks ago.   He's vomited due to coughing 4x in the past two weeks  He reacted today while interacting with the  at our clinic, due to scent  He hasn't been able to practice the bubbling, but has been able to do the pursed lip/ballooned cheek voice exercises. He's been able to do these a little better.     OBJECTIVE:  Patient Specific Goal Metrics:      1/25/2024    11:00 AM 3/21/2024    12:00 PM 4/4/2024    12:00 PM   Dysponia SLP Goals   How severe is your cough /throat clearing, if 0 is no cough at all and 10 is the worst cough? 10 7 8   How much does your cough/throat-clearing problem bother you?            Very Much Quite a bit Very Much       THERAPEUTIC ACTIVITIES  Today Derrick participated in the following therapeutic activities:  Counseling and Education:  Asked questions about the nature of his symptoms, and I answered all of these thoroughly.  Review and further explanation of therapy plan, including the normal functions of the larynx and how this relates to irritable larynx, with a self-perpetuation.   Answered questions about whether therapy is aiming to help stop his coughing in the moment or help him get better over time, with the answer being both.     I provided Derrick with explanation and skilled instruction of:  Exercises to coordinate phonation with optimal flowing airstream and reduce phonatory impact.   Semi-occluded vocal tract exercises with 1-1.5\" of water in a cup bubbles and SMALL straw  were most facilitating  He progressed through the modified warm-up level of phonatory complexity  Instructed to use as a voice warm up, cool down, coordination of breath flow with phonation, and for tissue mobilization.  Specific modifications instructed included discussion of a smaller straw creating less back pressure, therefore less likely to trigger laryngeal sensitivity  Patient " demonstrated good learning, but will need practice and additional reinforcement    Instruction of Home Practice:  A revised regimen for home practice was instructed.  I provided an AVS of important notes of today's therapeutic activities to facilitate practice.    ASSESSMENT/PLAN  Progress toward long-term goals:  Adequate but incomplete progress; please see above    Impressions:  IMPRESSIONS: chronic cough   Derrick had a productive session of therapy today, working on education regarding his symptoms and therapy plan, progression of cough suppression and SOVT tasks using a small diameter straw.  He will continue to work on his exercises on a daily basis, and work on incorporating the techniques into his daily activities. Speech therapy continues to be medically necessary for Derrick to participate fully and engage in activities of daily living.     Plan:   I will see Derrick in 2 weeks and will plan to attempt manual laryngeal release, continue small straw sip & blow, and progress bubbles with small straw.     For home practice goals, see AVS.     TOTAL SERVICE TIME: 50 minutes  TREATMENT (31828)    Milagros Landa (voice) M.S., CCC-SLP  Speech-Language Pathologist  [facility]  [phone]  [email or web address]  Pronouns: she/her/hers      *this report was created in part through the use of computerized dictation software, and though reviewed following completion, some typographic errors may persist.  If there is confusion regarding any of this notes contents, please contact me for clarification    OBJECTIVE FINDINGS  PATIENT REPORTED MEASURES  Patient Supplied Answers To Last 2 VHI Questionnaires       No data to display                   Patient Supplied Answers To Last 2 CSI Questionnaires       No data to display                   Patient Supplied Answers To Last 2 EAT Questionnaires       No data to display                  Patient Supplied Answers to Dyspnea Index Questionnaire:       No data  to display                   Again, thank you for allowing me to participate in the care of your patient.      Sincerely,    Ora Shepard, SLP

## 2024-04-04 NOTE — PROGRESS NOTES
[facility] VOICE CLINIC  PROGRESS REPORT (CPT [id number])    Patient: Aris Colon  Date of Service: 4/4/2024  Date of Last Service: 3/21/24  Number of Visits: 8/18 approved  Referring Physician: Dr. Kathy De Paz    Impressions from evaluation on 11/17/23 by Mark Dutta M.S., CCC-SLP:  Derrick Colon presents today with ongoing chronic cough which began in conjunction with workplace particle exposure, and with recent exacerbation including hemoptysis and emesis.  Laryngeal evaluation today was warranted given recent episodes of hemoptysis to ensure the patient was not recovering from hemorrhagic or ulcerative event.  This demonstrated no acute trauma which is fortunate, though diffuse change to the vocal folds and surrounding tissues from longstanding cough was seen consistent with previous evaluation if slightly worsened.  Cough response was aggressive to the application of oxymetazoline nasal spray, and guarding and light cough was noted to occur even prior to the administration, which underscores both the hypersensitivity as well as the stress base response to triggers that has developed over time.  Within the exam itself patient was able to attenuate cough through focused relaxation which bodes well for his ability to incorporate therapeutic strategies to reduce cough, though as we discussed full resolution of symptoms is often challenging due to their multifactorial nature especially in the case of irritant exposure.     SUBJECTIVE:  Since Derrick's last session, he reports the following:   He finds it very frustrating to try to sip in when he's coughing out, but sometimes he's able to do it and stop the cough.   His cough and getting phlegm up has been flared up since two weeks ago.   He's vomited due to coughing 4x in the past two weeks  He reacted today while interacting with the  at our clinic, due to scent  He hasn't been able to practice the bubbling, but has been able to do the  "pursed lip/ballooned cheek voice exercises. He's been able to do these a little better.     OBJECTIVE:  Patient Specific Goal Metrics:      1/25/2024    11:00 AM 3/21/2024    12:00 PM 4/4/2024    12:00 PM   Dysponia SLP Goals   How severe is your cough /throat clearing, if 0 is no cough at all and 10 is the worst cough? 10 7 8   How much does your cough/throat-clearing problem bother you?            Very Much Quite a bit Very Much       THERAPEUTIC ACTIVITIES  Today Derrick participated in the following therapeutic activities:  Counseling and Education:  Asked questions about the nature of his symptoms, and I answered all of these thoroughly.  Review and further explanation of therapy plan, including the normal functions of the larynx and how this relates to irritable larynx, with a self-perpetuation.   Answered questions about whether therapy is aiming to help stop his coughing in the moment or help him get better over time, with the answer being both.     I provided Derrick with explanation and skilled instruction of:  Exercises to coordinate phonation with optimal flowing airstream and reduce phonatory impact.   Semi-occluded vocal tract exercises with 1-1.5\" of water in a cup bubbles and SMALL straw  were most facilitating  He progressed through the modified warm-up level of phonatory complexity  Instructed to use as a voice warm up, cool down, coordination of breath flow with phonation, and for tissue mobilization.  Specific modifications instructed included discussion of a smaller straw creating less back pressure, therefore less likely to trigger laryngeal sensitivity  Patient demonstrated good learning, but will need practice and additional reinforcement    Instruction of Home Practice:  A revised regimen for home practice was instructed.  I provided an AVS of important notes of today's therapeutic activities to facilitate practice.    ASSESSMENT/PLAN  Progress toward long-term goals:  Adequate but incomplete " progress; please see above    Impressions:  IMPRESSIONS: chronic cough   Derrick had a productive session of therapy today, working on education regarding his symptoms and therapy plan, progression of cough suppression and SOVT tasks using a small diameter straw.  He will continue to work on his exercises on a daily basis, and work on incorporating the techniques into his daily activities. Speech therapy continues to be medically necessary for Derrick to participate fully and engage in activities of daily living.     Plan:   I will see Derrick in 2 weeks and will plan to attempt manual laryngeal release, continue small straw sip & blow, and progress bubbles with small straw.     For home practice goals, see AVS.     TOTAL SERVICE TIME: 50 minutes  TREATMENT (62215)    Milagros Landa (voice), M.S., CCC-SLP  Speech-Language Pathologist  [facility]  [phone]  [email or web address]  Pronouns: she/her/hers      *this report was created in part through the use of computerized dictation software, and though reviewed following completion, some typographic errors may persist.  If there is confusion regarding any of this notes contents, please contact me for clarification    OBJECTIVE FINDINGS  PATIENT REPORTED MEASURES  Patient Supplied Answers To Last 2 VHI Questionnaires       No data to display                   Patient Supplied Answers To Last 2 CSI Questionnaires       No data to display                   Patient Supplied Answers To Last 2 EAT Questionnaires       No data to display                  Patient Supplied Answers to Dyspnea Index Questionnaire:       No data to display

## 2024-04-04 NOTE — PATIENT INSTRUCTIONS
"Hello!     Please complete the exercises below and remember, a few minutes of practice many times throughout the day is more important than one large practice session. If you have any questions about your strategies, feel free to contact me at [email or web address] or via [employer]. Please call [phone] for scheduling alterations or to be seen sooner in case of cancellations.      Remember that your new goal is to KEEP THE AIR IN!!! Getting everything out leads to compression of your body, which is what makes you vomit and keep coughing.     Home Exercise Program:  1. Shoulders back/chest lifted!  2. Sip in & blow out with small straw  3. Repeat as needed, even if you're only getting little snippets of the exercise between coughs.      VOICE EXERCISES (1x/hour)  -- Inhale SILENTLY and feel your belly fill with air  -- Slowly exhale as your belly contracts, ROUND \"OO\" LIPS  -- Skinny Straw and 1 inch of water in a cup  -- \"Whoooooo\" (smooth bubbles like a motor boat or a simmering pot of water)  3x silently with just air for 5-7 seconds  3x short and easy sighs on \"whoooo\" with bubbles  3x sliding up and down on \"whoooo\" with bubbles  5 sentences, bubbling on \"whooo\" with the pitch pattern you would use when speaking.   -- Double-check that your LIPS are ROUNDED, chest lifted        ____________________________________________________     OLDER NOTES BELOW:      SILENT BREATHING STRATEGIES   These help to relax the throat and keep things calm. Noise, particularly higher pitch noise, as you inhale means you've likely tightened somewhere. The more noisy and tight your inhalation is, the more likely you'll start coughing.      Pick 1 inhalation and 1 exhalation, then repeat back and forth until your throat feels calmer.   INHALATION: EXHALATION:   1. Tongue tuck  2. Sniff  3. Puppy Yawn  4. Straw  5. Rounded lip  6. Lip Whistle or Kazoo 1. Tongue tuck  2. Ballooned cheeks  3. Fist balloon  4. [facility]  5. Sh sh " "sh sh sh...  6. Lip Whistle or Kazoo      Adding Voice:  For 2-3 minutes, here and there throughout the day, use your lip whistle or kazoo:   1. Inhale silently through the lip whistle or kazoo  2. Gently sigh out, letting air flow through the lip whistle or kazoo, adding your voice on \"Whoooooo\"  3. Play with pitch glides, gliding down, gliding up, gliding up and down like a siren  4. Double check that you hear the buzz/spin of the lip whistle/kazoo      ____________________________________________________     Sternocleidomastoid  1. Your SCM, or sternocleidomastoid, is the muscle that attaches to the bony bump behind each each and courses downward and slightly forward along your neck, attaching at the other end to your clavicle and sternum. If you turn your head to the side, you can feel the SCM muscle sticking out.   2. With your head facing forward, use the flat part of your thumb and pointer to gently pinch, knead, and massage the SCM on ONE SIDE at a time.   3. You can gently pinch and hold the SCM, or rub your thumb and pointer back and forth to knead the SCM, or gently jiggle the SCM forward and backward. Eventually add deep inhalations or sighs so that your SCM learns to also relax in the context of speaking/singing.   4. Now switch to the other side and repeat.   5. Remember that you never want to use more than 3 or 4/10 intensity. It's better to be gentle and repeat this strategy several times a day than to be aggressive and cause the SCM to tense up more in response to discomfort. If you get dizzy or feel a strong pulse, please move your finger or wait until we can review this.      Lateral Laryngeal Stretch:  1. Position your hands like praying, then rotate both sets of fingers toward yourself and place them along each side of your marco's apple. OR, place your thumb and two fingers on each side of your marco's apple.   2. Gentle shift your marco's apple side-to-side, only about a centimeter in each " "direction.   3. Decide which direction feels easier.   4. Shift your marco's apple about a centimeter in the easier direction and hold, breathing slow, deep breaths for about 120 seconds.   5. Shift the other direction and hold, breathing slow, deep breaths for about 120 seconds.      Downward Laryngeal Stretch:  1. Gently find the groove at the top of your marco's apple, then follow the groove to each side and place a thumb and pointer finger on each side of your marco's apple, within the slight indented groove along the top of each side.   2. Inhale slowly with a yawny sensation, gently pulling downward along your marco's apple (Don't pinch hard, just enough to \"suggest\" a downward movement).   3. Reset your fingers at the top of your larynx and repeat several times.   4. Always be gentle- don't use enough pressure that you would bruise a peach or banana if you were holding one between your fingers.   5. After repeating 3-5 times, add a gentle sigh as you exhale \"Huuuhhhhh\"   ____________________________________________________     BELLY BREATHING  Stretch: (each morning)  Stretch your arms up and take two slow, deep breaths, feeling your rib cage lift and stretch.    Then lean toward each side, taking 2 slow, deep breaths on each side, feeling your ribcage lift and expand along the sides.   Now leaning back, arching your back to feel a stretch along the front of your ribs, and take 2 more slow, deep breaths.    Lastly, lean forward and allow your back and neck to stretch and take 2 slow, deep breaths.      Practice: (5 breaths per hour or 10 breaths at each meal)  Sit hinged forward with your elbows on your knees. OR Sit normally and place your hands on either side of your belly and back or low ribs  If someone is nearby to help, they can place their hands on the sides of your back and ribs.   Slowly breathe in and feel your belly and back expand, like filling a balloon, pushing out against your waistband  Slowly " "breathe out on [facility] and feel your belly and back crunch inward, like zipping tight pants  Repeat slowly and take breaks if you get dizzy     HELPFUL HINTS:  -- Tie a string or piece of elastic around your low ribs when you get dressed. You'll be able to feel yourself expand against this as you breathe all day.   -- Some people find it easiest to practice while laying on their back or in a recliner, hands on their belly.  -- You can also lay on your stomach with your arms over your head to feel your back expand. It's not unusual for people to struggle noticing their back expanding to start with.      WHEN YOU COUGH:   Keep your sternum lifted  Make the abs crunch up and in, like someone punching you in the gut.   ____________________________________________________      COUGH STOPPER  1. Press your fist against your lips  2. Aim to balloon your cheeks and throat as you cough  3. ONLY breathe back in through your nose- inhaling through your mouth triggers more coughing  4. Keep repeating the balloon and sniff, balloon and sniff, until the spasm stops.     If you are willing, teach this to friends and family so that when you start coughing, someone next to you can say \"Make a fist! Balloon! Balloon!\"  OR put up post-it note reminders for yourself        Thank you and have a great day!  Rosendo Marmolejo. (voice) MNikolaiS., CCC-SLP  Speech-Language Pathologist  [facility]  [phone]  [email or web address]  Pronouns: she/her/hers  "

## 2024-04-18 ENCOUNTER — TELEPHONE (OUTPATIENT)
Dept: OTOLARYNGOLOGY | Facility: CLINIC | Age: 54
End: 2024-04-18

## 2024-04-18 ENCOUNTER — MYC MEDICAL ADVICE (OUTPATIENT)
Dept: OTOLARYNGOLOGY | Facility: CLINIC | Age: 54
End: 2024-04-18

## 2024-05-06 ENCOUNTER — OFFICE VISIT (OUTPATIENT)
Dept: OTOLARYNGOLOGY | Facility: CLINIC | Age: 54
End: 2024-05-06
Payer: OTHER MISCELLANEOUS

## 2024-05-06 DIAGNOSIS — R05.3 CHRONIC COUGH: Primary | ICD-10-CM

## 2024-05-06 PROCEDURE — 92507 TX SP LANG VOICE COMM INDIV: CPT | Mod: GN | Performed by: SPEECH-LANGUAGE PATHOLOGIST

## 2024-05-06 NOTE — PATIENT INSTRUCTIONS
"Hello!     Please complete the exercises below and remember, a few minutes of practice many times throughout the day is more important than one large practice session. If you have any questions about your strategies, feel free to contact me at [email or web address] or via [email or web address]. Please call [phone] for scheduling alterations or to be seen sooner in case of cancellations.      Remember that your new goal is to KEEP THE AIR IN!!! Getting everything out leads to compression of your body, which is what makes you vomit and keep coughing.     Home Exercise Program:  1. Shoulders back/chest lifted!  2. Sip in & blow out with small straw  3. Repeat as needed, even if you're only getting little snippets of the exercise between coughs.     VOICE EXERCISES (1x/hour)  -- Inhale SILENTLY and feel your belly fill with air  -- Slowly exhale as your belly contracts, ROUND \"OO\" LIPS  -- Skinny Straw and 1 inch of water in a cup  -- \"Whoooooo\" (smooth bubbles like a motor boat or a simmering pot of water)  3x silently with just air for 5-7 seconds  5x steady pitch on 116.54Hz (use the deanna)  3x sliding up and down on \"whoooo\" with bubbles  5 sentences, bubbling on \"whooo\" with the pitch pattern you would use when speaking.   -- Double-check that your LIPS are ROUNDED, chest lifted      Download the deanna Pitch Perfect (Gray background/White Music Symbols)  Open the deanna  Tap the second from the bottom left option \"Notes\"  Scroll until you find 116.54Hz   Tap that line to hear your Target Pitch  Feel the air flow      ____________________________________________________     OLDER NOTES BELOW:      SILENT BREATHING STRATEGIES   These help to relax the throat and keep things calm. Noise, particularly higher pitch noise, as you inhale means you've likely tightened somewhere. The more noisy and tight your inhalation is, the more likely you'll start coughing.      Pick 1 inhalation and 1 exhalation, then repeat back and forth " "until your throat feels calmer.   INHALATION: EXHALATION:   1. Tongue tuck  2. Sniff  3. Puppy Yawn  4. Straw  5. Rounded lip  6. Lip Whistle or Kazoo 1. Tongue tuck  2. Ballooned cheeks  3. Fist balloon  4. Shhhhhhh  5. Sh sh sh sh sh...  6. Lip Whistle or Kazoo      Adding Voice:  For 2-3 minutes, here and there throughout the day, use your lip whistle or kazoo:   1. Inhale silently through the lip whistle or kazoo  2. Gently sigh out, letting air flow through the lip whistle or kazoo, adding your voice on \"Whoooooo\"  3. Play with pitch glides, gliding down, gliding up, gliding up and down like a siren  4. Double check that you hear the buzz/spin of the lip whistle/kazoo      ____________________________________________________     Sternocleidomastoid  1. Your SCM, or sternocleidomastoid, is the muscle that attaches to the bony bump behind each each and courses downward and slightly forward along your neck, attaching at the other end to your clavicle and sternum. If you turn your head to the side, you can feel the SCM muscle sticking out.   2. With your head facing forward, use the flat part of your thumb and pointer to gently pinch, knead, and massage the SCM on ONE SIDE at a time.   3. You can gently pinch and hold the SCM, or rub your thumb and pointer back and forth to knead the SCM, or gently jiggle the SCM forward and backward. Eventually add deep inhalations or sighs so that your SCM learns to also relax in the context of speaking/singing.   4. Now switch to the other side and repeat.   5. Remember that you never want to use more than 3 or 4/10 intensity. It's better to be gentle and repeat this strategy several times a day than to be aggressive and cause the SCM to tense up more in response to discomfort. If you get dizzy or feel a strong pulse, please move your finger or wait until we can review this.      Lateral Laryngeal Stretch:  1. Position your hands like praying, then rotate both sets of fingers " "toward yourself and place them along each side of your marco's apple. OR, place your thumb and two fingers on each side of your marco's apple.   2. Gentle shift your marco's apple side-to-side, only about a centimeter in each direction.   3. Decide which direction feels easier.   4. Shift your marco's apple about a centimeter in the easier direction and hold, breathing slow, deep breaths for about 120 seconds.   5. Shift the other direction and hold, breathing slow, deep breaths for about 120 seconds.      Downward Laryngeal Stretch:  1. Gently find the groove at the top of your marco's apple, then follow the groove to each side and place a thumb and pointer finger on each side of your marco's apple, within the slight indented groove along the top of each side.   2. Inhale slowly with a yawny sensation, gently pulling downward along your marco's apple (Don't pinch hard, just enough to \"suggest\" a downward movement).   3. Reset your fingers at the top of your larynx and repeat several times.   4. Always be gentle- don't use enough pressure that you would bruise a peach or banana if you were holding one between your fingers.   5. After repeating 3-5 times, add a gentle sigh as you exhale \"Huuuhhhhh\"   ____________________________________________________     BELLY BREATHING  Stretch: (each morning)  Stretch your arms up and take two slow, deep breaths, feeling your rib cage lift and stretch.    Then lean toward each side, taking 2 slow, deep breaths on each side, feeling your ribcage lift and expand along the sides.   Now leaning back, arching your back to feel a stretch along the front of your ribs, and take 2 more slow, deep breaths.    Lastly, lean forward and allow your back and neck to stretch and take 2 slow, deep breaths.      Practice: (5 breaths per hour or 10 breaths at each meal)  Sit hinged forward with your elbows on your knees. OR Sit normally and place your hands on either side of your belly and back or low " "ribs  If someone is nearby to help, they can place their hands on the sides of your back and ribs.   Slowly breathe in and feel your belly and back expand, like filling a balloon, pushing out against your waistband  Slowly breathe out on SHHHHH and feel your belly and back crunch inward, like zipping tight pants  Repeat slowly and take breaks if you get dizzy     HELPFUL HINTS:  -- Tie a string or piece of elastic around your low ribs when you get dressed. You'll be able to feel yourself expand against this as you breathe all day.   -- Some people find it easiest to practice while laying on their back or in a recliner, hands on their belly.  -- You can also lay on your stomach with your arms over your head to feel your back expand. It's not unusual for people to struggle noticing their back expanding to start with.      WHEN YOU COUGH:   Keep your sternum lifted  Make the abs crunch up and in, like someone punching you in the gut.   ____________________________________________________      COUGH STOPPER  1. Press your fist against your lips  2. Aim to balloon your cheeks and throat as you cough  3. ONLY breathe back in through your nose- inhaling through your mouth triggers more coughing  4. Keep repeating the balloon and sniff, balloon and sniff, until the spasm stops.     If you are willing, teach this to friends and family so that when you start coughing, someone next to you can say \"Make a fist! Balloon! Balloon!\"  OR put up post-it note reminders for yourself        Thank you and have a great day!  Rosendo Marmolejo. (voice) M.S., CCC-SLP  Speech-Language Pathologist  [facility]  [phone]  [email or web address]  Pronouns: she/her/hers  "

## 2024-05-06 NOTE — PROGRESS NOTES
[facility] VOICE CLINIC  PROGRESS REPORT (CPT 51306)    Patient: Aris Colon  Date of Service: 5/6/2024  Date of Last Service: 4/4/24  Number of Visits: 9/18 approved  Referring Physician: Dr. Kathy De Paz    Impressions from evaluation on 11/17/23 by Mark Dutta M.S., CCC-SLP:  Derrick Colon presents today with ongoing chronic cough which began in conjunction with workplace particle exposure, and with recent exacerbation including hemoptysis and emesis.  Laryngeal evaluation today was warranted given recent episodes of hemoptysis to ensure the patient was not recovering from hemorrhagic or ulcerative event.  This demonstrated no acute trauma which is fortunate, though diffuse change to the vocal folds and surrounding tissues from longstanding cough was seen consistent with previous evaluation if slightly worsened.  Cough response was aggressive to the application of oxymetazoline nasal spray, and guarding and light cough was noted to occur even prior to the administration, which underscores both the hypersensitivity as well as the stress base response to triggers that has developed over time.  Within the exam itself patient was able to attenuate cough through focused relaxation which bodes well for his ability to incorporate therapeutic strategies to reduce cough, though as we discussed full resolution of symptoms is often challenging due to their multifactorial nature especially in the case of irritant exposure.        SUBJECTIVE:  Since Derrick's last session, he reports the following:   He has vomited 5 times in the past month, due to 2x in the shower for too long with over-heating, 1x due to moisture in the air from rain, 1x shooting pool when  carried spicy buffalo wings past him and the scent triggered his cough, and 1x when he was shooting pool and was sitting next to a table wearing perfume.   He tried to decrease his pillow elevation the other night and ended up coughing and  "spitting up repeatedly, but not vomiting. He had to re-elevate with pillows.   He's also noticed that emotion can factor into his coughing, particularly if someone asks how he's doing and he starts talking about how frustrated he is with his cough.   He's started keeping a 1/2 length coffee straw in his mouth like a toothpick throughout the day, to the point that friends,  at his regular places will offer him a replacement if he forgets his in the car.   He's been practicing his humming exercises through the straw throughout the day.   He still has to talk softly or he'll start coughing with speaking.    OBJECTIVE:  Patient Specific Goal Metrics:      1/25/2024    11:00 AM 3/21/2024    12:00 PM 4/4/2024    12:00 PM   Dysponia SLP Goals   How severe is your cough /throat clearing, if 0 is no cough at all and 10 is the worst cough? 10 7 8   How much does your cough/throat-clearing problem bother you?            Very Much Quite a bit Very Much     FO: 82-87 Hz  Range: 77-440Hz    THERAPEUTIC ACTIVITIES  Today Derrick participated in the following therapeutic activities:  Counseling and Education:  Asked questions about the nature of his symptoms, and I answered all of these thoroughly.    I provided Derrick with explanation and skilled instruction of:  Reinforcement of cough suppression techniques, which he has implemented to excellent effect by keeping a small coffee stirrer straw in his mouth most of the day.   No uncontrolled coughing fits.  He benefited from verbal reinforcement at times to blow and straw sip  We talked about vocal range vs speaking pitch, \"basement\" being an extreme which causes more irritation in the larynx.   He has habitually demonstrated a lower than optimal speaking FO, very close to his lowest possible frequency.   He endorsed feeling more of a scratch or tickle on the left side of his larynx when speaking on his habitual pitch vs target pitch, which he endorsed feeling more in his lips " and nose.   A target pitch was identified and strategies for finding this pitch in home practice were instructed.   This target pitch was then targeted using his SOVT tasks, to good effect.   He had notably fewer coughing episodes once we began targeting phonation at the slightly elevated frequency.    Instruction of Home Practice:  A revised regimen for home practice was instructed.  I provided an AVS of important notes of today's therapeutic activities to facilitate practice.    ASSESSMENT/PLAN  Progress toward long-term goals:  Adequate but incomplete progress; please see above    Impressions:  IMPRESSIONS: chronic cough   Derrick had a productive session of therapy today, working on SOVT tasks with targeted pitch.  He will continue to work on his exercises on a daily basis, and work on incorporating the techniques into his daily activities. Speech therapy continues to be medically necessary for Derrick to participate fully and engage in activities of daily living.     Plan:   I will see Derrick in 2.5 weeks and will plan to expand target pitch SOVT (116Hz) to humming and chanted M tasks. Attempt manual laryngeal release, continue small straw sip and blow to stop cough.    For home practice goals, see AVS.     TOTAL SERVICE TIME: 60 minutes  TREATMENT (63913)    Milagros Landa (voice), M.S., CCC-SLP  Speech-Language Pathologist  [facility]  [phone]  [email or web address]  Pronouns: she/her/hers      *this report was created in part through the use of computerized dictation software, and though reviewed following completion, some typographic errors may persist.  If there is confusion regarding any of this notes contents, please contact me for clarification    OBJECTIVE FINDINGS  PATIENT REPORTED MEASURES  Patient Supplied Answers To Last 2 VHI Questionnaires       No data to display                   Patient Supplied Answers To Last 2 CSI Questionnaires       No data to display                    Patient Supplied Answers To Last 2 EAT Questionnaires       No data to display                  Patient Supplied Answers to Dyspnea Index Questionnaire:       No data to display

## 2024-05-06 NOTE — Clinical Note
5/6/2024       RE: Aris Colon  [address] [place] [id number]     Dear Colleague,    Thank you for referring your patient, Aris Colon, to the [facility] VOICE CLINIC [place] at [facility]. Please see a copy of my visit note below.    [facility]  PROGRESS REPORT (CPT 64623)    Patient: Aris Colon  Date of Service: 5/6/2024  Date of Last Service: 4/4/24  Number of Visits: 9/18 approved  Referring Physician: Dr. Kathy De Paz    Impressions from evaluation on 11/17/23 by Mark Dutta M.S., CCC-SLP:  Derrick Colon presents today with ongoing chronic cough which began in conjunction with workplace particle exposure, and with recent exacerbation including hemoptysis and emesis.  Laryngeal evaluation today was warranted given recent episodes of hemoptysis to ensure the patient was not recovering from hemorrhagic or ulcerative event.  This demonstrated no acute trauma which is fortunate, though diffuse change to the vocal folds and surrounding tissues from longstanding cough was seen consistent with previous evaluation if slightly worsened.  Cough response was aggressive to the application of oxymetazoline nasal spray, and guarding and light cough was noted to occur even prior to the administration, which underscores both the hypersensitivity as well as the stress base response to triggers that has developed over time.  Within the exam itself patient was able to attenuate cough through focused relaxation which bodes well for his ability to incorporate therapeutic strategies to reduce cough, though as we discussed full resolution of symptoms is often challenging due to their multifactorial nature especially in the case of irritant exposure.        SUBJECTIVE:  Since Derrick's last session, he reports the following:   ***    OBJECTIVE:  Patient Specific Goal Metrics:      1/25/2024    11:00 AM  3/21/2024    12:00 PM 4/4/2024    12:00 PM   Dysponia SLP Goals   How severe is your cough /throat clearing, if 0 is no cough at all and 10 is the worst cough? 10 7 8   How much does your cough/throat-clearing problem bother you?            Very Much Quite a bit Very Much       THERAPEUTIC ACTIVITIES  Today Derrick participated in the following therapeutic activities:  Counseling and Education:  Asked *** questions about the nature of his symptoms, and I answered all of these thoroughly.    I provided Derrick with explanation and skilled instruction of:  ***    Instruction of Home Practice:  A revised regimen for home practice was instructed.  I provided an AVS of important notes of today's therapeutic activities to facilitate practice.    ASSESSMENT/PLAN  Progress toward long-term goals:  {[facility] Treatment Progress:271559}    Impressions:  IMPRESSIONS: chronic cough   Derrick had a productive session of therapy today, working on ***.  He will continue to work on his exercises on a daily basis, and work on incorporating the techniques into his daily activities. Speech therapy continues to be medically necessary for Derrick to participate fully and engage in activities of daily living.     Plan:   I will see Derrick in *** weeks and will plan to ***. attempt manual laryngeal release, continue small straw sip & blow, and progress bubbles with small straw.     Next Clinic Appt: ***  Plan for SLP: ***    For home practice goals, see AVS.     TOTAL SERVICE TIME: ***60 minutes  TREATMENT (08662)    Milagros Landa (voice), M.S., CCC-SLP  Speech-Language Pathologist  [facility]  [phone]  [email or web address]  Pronouns: she/her/hers      *this report was created in part through the use of computerized dictation software, and though reviewed following completion, some typographic errors may persist.  If there is confusion regarding any of this notes contents, please contact me for clarification    OBJECTIVE  FINDINGS  PATIENT REPORTED MEASURES  Patient Supplied Answers To Last 2 VHI Questionnaires       No data to display                   Patient Supplied Answers To Last 2 CSI Questionnaires       No data to display                   Patient Supplied Answers To Last 2 EAT Questionnaires       No data to display                  Patient Supplied Answers to Dyspnea Index Questionnaire:       No data to display                   Again, thank you for allowing me to participate in the care of your patient.      Sincerely,    Ora Shepard, SLP

## 2024-05-13 NOTE — PROGRESS NOTES
"HISTORY OF PRESENT ILLNESS: Aris Colon is a 54 year old male with a history of chronic cough.  This is following a chemical exposure.  He has been working with our speech-language pathologist.    He has been evaluated in the pulmonary clinic and a diagnosis of reactive airways dysfunction syndrome.  He underwent chest CT endoscopy which was notable for normal vocal folds and lower airways.  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  He felt that he is having some improvement with speech therapy.He had a videofluoroscopic swallow study test which was essentially normal in September 2023.    Is felt  by his pulmonologist that he had exposure to overwhelming amounts of dust in the workplace leading to a chronic refractory cough.    He continues to work with speech therapy and a severe laryngeal nerve block was suggested and he is seen to consider  this today    Today he has a number of concerns and is interested in the nature of the superior laryngeal nerve block as well as other interventions towards the cough.  He was exposed to perfume in the waiting room and has significant coughing during her visit here today.  He is quite frustrated with his cough but was very polite polite and no animosity towards any one in the room was present.  We went over his history in detail.  We also reviewed his symptoms in detail answering multiple excellent questions.  He does have concerns about long-term complications from the block.  He was particular disturbed about the term block and was much more comfortable with the idea of the nerve being \"numb\" rather than the terminology of a block.  He did have a QR C person with him related to his workmen's comp claim.  She was able to take complete notes of the visit for him and was quite supportive.    Last 2 Scores for " Patient-Answered VHI Questionnaire       No data to display                Last 2 Scores for Patient-Answered CSI Questionnaire       No data to display                  Last 2 Scores for Patient-Answered EAT Questionnaire       No data to display                    PAST MEDICAL HISTORY: No past medical history on file.    PAST SURGICAL HISTORY: No past surgical history on file.    FAMILY HISTORY: No family history on file.    SOCIAL HISTORY:   Social History     Tobacco Use    Smoking status: Never     Passive exposure: Never    Smokeless tobacco: Never   Substance Use Topics    Alcohol use: Not on file       REVIEW OF SYSTEMS: Ten point review of systems was performed and is negative except for:       11/17/2023     8:05 AM   [facility] ROS   Constitutional Weight gain    Appetite change    Problems with sleep   Ears, Nose, Throat Nasal congestion or drainage        ALLERGIES: Patient has no known allergies.    MEDICATIONS:   Current Outpatient Medications   Medication Sig Dispense Refill    albuterol (PROAIR HFA/PROVENTIL HFA/VENTOLIN HFA) 108 (90 Base) MCG/ACT inhaler 2 puffs every 4 hours as needed      benzonatate (TESSALON) 100 MG capsule Take 1 capsule (100 mg) by mouth 3 times daily as needed for cough 30 capsule 4    FLUoxetine (PROZAC) 20 MG capsule       fluticasone-vilanterol (BREO ELLIPTA) 100-25 MCG/ACT inhaler       hydrOXYzine (ATARAX) 10 MG tablet       LANsoprazole (PREVACID) 30 MG DR capsule Take 30 mg by mouth      pantoprazole (PROTONIX) 40 MG EC tablet Take 1 tablet (40 mg) by mouth 2 times daily 180 tablet 2    sildenafil (REVATIO) 20 MG tablet TAKE 1-5 TABLETS BY MOUTH 30 MINS TO 4 HOURS BEFORE SEXUAL ACTIVITY      tiotropium (SPIRIVA RESPIMAT) 2.5 MCG/ACT inhaler Inhale 2 puffs into the lungs           PHYSICAL EXAMINATION:  He  is awake, alert and in no apparent distress.    His tympanic membranes are clear and intact bilaterally. External auditory canals are clear.  Nasal exam shows a mild  septal deviation without obstruction.  His nasal cavity is quite sensitive today.  Examination of the oral cavity shows no suspicious lesions.  There is symmetric movement of the tongue and soft palate.    The oropharynx is clear.  His neck is supple without significant adenopathy.  Palpation of the thyrohyoid membrane was tender and also quite sensitive producing a cough.  Pulse is regular.  Upper airway is clear.  Cranial nerves II-XII are grossly intact.       PROCEDURE: A flexible laryngoscopy  was performed.  Informed consent was obtained and a time out was performed. 2% lidocaine and 0.025% oxymetazoline was sprayed into the nasal cavity and allowed 3 to 5 minutes for effect. The scope was passed through the left sided nostril.  This nasal cavity was quite sensitive and this took several minutes to pass the scope the length of the nose.  No significant obstruction was present but he noted significant irritation and sensation of cough with the scope being present.  He however did an excellent job tolerating the procedure and we were able to get an adequate although limited laryngeal exam.  Examination showed the vocal folds to be mobile and meet in the midline.  No nodules, polyps or ulcerations are seen.  There is mild inflammation at the posterior commissure.   The vocal folds show moderate inflammation particularly in the posterior aspect of the vocal folds. With phonation there is moderate contraction of the supraglottic larynx.  This contraction however may be partially due to his discomfort with the examination.  The hypopharynx is otherwise clear as is the subglottis.     5/14/2024 Exam      Sustained vowel phonation.          IMPRESSION/PLAN:  He is a very pleasant gentleman who has been suffering from a chronic cough for 2 and half years likely secondary to industrial exposure.  Laryngeal exam shows irritation along the arytenoids but no other abnormal lesions or defects seen.  We discussed the  positive superior laryngeal nerve block.  We went over in detail with the potential complications and benefits and the use of the superior laryngeal nerve block.  At this point he would like to think about the superior laryngeal nerve block.  He would also like to make sure that it is covered by his Workmen's Compensation and he would like to talk to family members about this as well.  I did recommend that he continue to work with the speech-language pathologist.  We will give him the billing code for the superior laryngeal nerve block and he will consider this and let us know.  He does have an appoint with Dr. De Paz in early July and he could certainly have it done at that time as well.  All questions were answered.

## 2024-05-14 ENCOUNTER — OFFICE VISIT (OUTPATIENT)
Dept: OTOLARYNGOLOGY | Facility: CLINIC | Age: 54
End: 2024-05-14
Payer: OTHER MISCELLANEOUS

## 2024-05-14 VITALS
HEART RATE: 80 BPM | DIASTOLIC BLOOD PRESSURE: 95 MMHG | WEIGHT: 211 LBS | BODY MASS INDEX: 30.21 KG/M2 | HEIGHT: 70 IN | SYSTOLIC BLOOD PRESSURE: 150 MMHG

## 2024-05-14 DIAGNOSIS — R05.3 CHRONIC COUGH: Primary | ICD-10-CM

## 2024-05-14 PROCEDURE — 99214 OFFICE O/P EST MOD 30 MIN: CPT | Mod: 25 | Performed by: OTOLARYNGOLOGY

## 2024-05-14 PROCEDURE — 31575 DIAGNOSTIC LARYNGOSCOPY: CPT | Performed by: OTOLARYNGOLOGY

## 2024-05-14 NOTE — PATIENT INSTRUCTIONS
1.  You were seen in the ENT Clinic today by . If you have any questions or concerns after your appointment, please call [phone]. Press option #1 for scheduling related needs. Press option #3 for Nurse advice.    2.   has recommended the following:   - Contact insurance for prior auth for procedure         How to Contact Us:  Send a [email or web address] message to your provider. Our team will respond to you via [email or web address]. Occasionally, we will need to call you to get further information.  For urgent matters (Monday-Friday, 8:00 AM-3:30 PM), call the ENT Clinic: [phone] and speak with a call center team member - they will route your call appropriately.   If you'd like to speak directly with a nurse, please call [phone]. We do our best to check voicemail frequently throughout the day, and will work to call you back within 1-2 days. For urgent matters, please use the general clinic phone numbers listed above.    Fatemeh He RN  [phone]  [facility] - Otolaryngology

## 2024-05-14 NOTE — LETTER
5/14/2024       RE: Aris Colon  [address] [place] [id number]     Dear Colleague,    Thank you for referring your patient, Aris Colon, to the [facility] E[facility]SE [facility]IC [place] at [facility]. Please see a copy of my visit note below.    HISTORY OF PRESENT ILLNESS: Aris Colon is a 54 year old male with a history of chronic cough.  This is following a chemical exposure.  He has been working with our speech-language pathologist.    He has been evaluated in the pulmonary clinic and a diagnosis of reactive airways dysfunction syndrome.  He underwent chest CT endoscopy which was notable for normal vocal folds and lower airways.  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  He felt that he is having some improvement with speech therapy.He had a videofluoroscopic swallow study test which was essentially normal in September 2023.    Is felt  by his pulmonologist that he had exposure to overwhelming amounts of dust in the workplace leading to a chronic refractory cough.    He continues to work with speech therapy and a severe laryngeal nerve block was suggested and he is seen to consider  this today    Today he has a number of concerns and is interested in the nature of the superior laryngeal nerve block as well as other interventions towards the cough.  He was exposed to perfume in the waiting room and has significant coughing during her visit here today.  He is quite frustrated with his cough but was very polite polite and no animosity towards any one in the room was present.  We went over his history in detail.  We also reviewed his symptoms in detail answering multiple excellent questions.  He does have concerns about long-term complications from the  "block.  He was particular disturbed about the term block and was much more comfortable with the idea of the nerve being \"numb\" rather than the terminology of a block.  He did have a QR C person with him related to his workmen's comp claim.  She was able to take complete notes of the visit for him and was quite supportive.    Last 2 Scores for Patient-Answered VHI Questionnaire       No data to display                Last 2 Scores for Patient-Answered CSI Questionnaire       No data to display                  Last 2 Scores for Patient-Answered EAT Questionnaire       No data to display                    PAST MEDICAL HISTORY: No past medical history on file.    PAST SURGICAL HISTORY: No past surgical history on file.    FAMILY HISTORY: No family history on file.    SOCIAL HISTORY:   Social History     Tobacco Use     Smoking status: Never     Passive exposure: Never     Smokeless tobacco: Never   Substance Use Topics     Alcohol use: Not on file       REVIEW OF SYSTEMS: Ten point review of systems was performed and is negative except for:       11/17/2023     8:05 AM   [facility] ROS   Constitutional Weight gain    Appetite change    Problems with sleep   Ears, Nose, Throat Nasal congestion or drainage        ALLERGIES: Patient has no known allergies.    MEDICATIONS:   Current Outpatient Medications   Medication Sig Dispense Refill     albuterol (PROAIR HFA/PROVENTIL HFA/VENTOLIN HFA) 108 (90 Base) MCG/ACT inhaler 2 puffs every 4 hours as needed       benzonatate (TESSALON) 100 MG capsule Take 1 capsule (100 mg) by mouth 3 times daily as needed for cough 30 capsule 4     FLUoxetine (PROZAC) 20 MG capsule        fluticasone-vilanterol (BREO ELLIPTA) 100-25 MCG/ACT inhaler        hydrOXYzine (ATARAX) 10 MG tablet        LANsoprazole (PREVACID) 30 MG DR capsule Take 30 mg by mouth       pantoprazole (PROTONIX) 40 MG EC tablet Take 1 tablet (40 mg) by mouth 2 times daily 180 tablet 2     sildenafil (REVATIO) 20 MG tablet " TAKE 1-5 TABLETS BY MOUTH 30 MINS TO 4 HOURS BEFORE SEXUAL ACTIVITY       tiotropium (SPIRIVA RESPIMAT) 2.5 MCG/ACT inhaler Inhale 2 puffs into the lungs           PHYSICAL EXAMINATION:  He  is awake, alert and in no apparent distress.    His tympanic membranes are clear and intact bilaterally. External auditory canals are clear.  Nasal exam shows a mild septal deviation without obstruction.  His nasal cavity is quite sensitive today.  Examination of the oral cavity shows no suspicious lesions.  There is symmetric movement of the tongue and soft palate.    The oropharynx is clear.  His neck is supple without significant adenopathy.  Palpation of the thyrohyoid membrane was tender and also quite sensitive producing a cough.  Pulse is regular.  Upper airway is clear.  Cranial nerves II-XII are grossly intact.       PROCEDURE: A flexible laryngoscopy  was performed.  Informed consent was obtained and a time out was performed. 2% lidocaine and 0.025% oxymetazoline was sprayed into the nasal cavity and allowed 3 to 5 minutes for effect. The scope was passed through the left sided nostril.  This nasal cavity was quite sensitive and this took several minutes to pass the scope the length of the nose.  No significant obstruction was present but he noted significant irritation and sensation of cough with the scope being present.  He however did an excellent job tolerating the procedure and we were able to get an adequate although limited laryngeal exam.  Examination showed the vocal folds to be mobile and meet in the midline.  No nodules, polyps or ulcerations are seen.  There is mild inflammation at the posterior commissure.   The vocal folds show moderate inflammation particularly in the posterior aspect of the vocal folds. With phonation there is moderate contraction of the supraglottic larynx.  This contraction however may be partially due to his discomfort with the examination.  The hypopharynx is otherwise clear as is  the subglottis.     5/14/2024 Exam      Sustained vowel phonation.          IMPRESSION/PLAN:  He is a very pleasant gentleman who has been suffering from a chronic cough for 2 and half years likely secondary to industrial exposure.  Laryngeal exam shows irritation along the arytenoids but no other abnormal lesions or defects seen.  We discussed the positive superior laryngeal nerve block.  We went over in detail with the potential complications and benefits and the use of the superior laryngeal nerve block.  At this point he would like to think about the superior laryngeal nerve block.  He would also like to make sure that it is covered by his Workmen's Compensation and he would like to talk to family members about this as well.  I did recommend that he continue to work with the speech-language pathologist.  We will give him the billing code for the superior laryngeal nerve block and he will consider this and let us know.  He does have an appoint with Dr. De Paz in early July and he could certainly have it done at that time as well.  All questions were answered.            Again, thank you for allowing me to participate in the care of your patient.      Sincerely,    Bryson Chapa MD

## 2024-05-23 ENCOUNTER — OFFICE VISIT (OUTPATIENT)
Dept: OTOLARYNGOLOGY | Facility: CLINIC | Age: 54
End: 2024-05-23
Payer: OTHER MISCELLANEOUS

## 2024-05-23 ENCOUNTER — TELEPHONE (OUTPATIENT)
Dept: OTOLARYNGOLOGY | Facility: CLINIC | Age: 54
End: 2024-05-23
Payer: COMMERCIAL

## 2024-05-23 DIAGNOSIS — R05.3 CHRONIC COUGH: Primary | ICD-10-CM

## 2024-05-23 PROCEDURE — 92507 TX SP LANG VOICE COMM INDIV: CPT | Mod: GN | Performed by: SPEECH-LANGUAGE PATHOLOGIST

## 2024-05-23 NOTE — PROGRESS NOTES
[facility] VOICE CLINIC  PROGRESS REPORT (CPT 08292)    Patient: Aris Colon  Date of Service: 2024  Date of Last Service: 24  Number of Visits: 10/18 approved  Referring Physician: Dr. Kathy De Paz    Impressions from evaluation on 23 by Mark Dutta M.S., CCC-SLP:  Derrick Colon presents today with ongoing chronic cough which began in conjunction with workplace particle exposure, and with recent exacerbation including hemoptysis and emesis.  Laryngeal evaluation today was warranted given recent episodes of hemoptysis to ensure the patient was not recovering from hemorrhagic or ulcerative event.  This demonstrated no acute trauma which is fortunate, though diffuse change to the vocal folds and surrounding tissues from longstanding cough was seen consistent with previous evaluation if slightly worsened.  Cough response was aggressive to the application of oxymetazoline nasal spray, and guarding and light cough was noted to occur even prior to the administration, which underscores both the hypersensitivity as well as the stress base response to triggers that has developed over time.  Within the exam itself patient was able to attenuate cough through focused relaxation which bodes well for his ability to incorporate therapeutic strategies to reduce cough, though as we discussed full resolution of symptoms is often challenging due to their multifactorial nature especially in the case of irritant exposure.        SUBJECTIVE:  Since Derrick's last session, he reports the following:   He had a consult with Dr. Chapa and reports he has a better understanding of the superior laryngeal nerve block.   He vomited 3x in the last two weeks, triggered from food smell (buffalo wild wings), incense at a , and perfume.     OBJECTIVE:  Patient Specific Goal Metrics:      2024    11:00 AM 3/21/2024    12:00 PM 2024    12:00 PM   Dysponia SLP Goals   How severe is your cough /throat  clearing, if 0 is no cough at all and 10 is the worst cough? 10 7 8   How much does your cough/throat-clearing problem bother you?            Very Much Quite a bit Very Much   FO: 92-96Hz  Target FO: 116Hz    THERAPEUTIC ACTIVITIES  Today Derrick participated in the following therapeutic activities:  Counseling and Education:  Asked questions about the nature of his symptoms, and I answered all of these thoroughly.    I provided Derrick with explanation and skilled instruction of:  Exercises to coordinate phonation with optimal flowing airstream and reduce phonatory impact.   Semi-occluded vocal tract exercises transitioning into M initial speech tasks were most facilitating  He progressed through the hum and syllable level of phonatory complexity  He demonstrated fair ability to self-monitor and identify target voice production versus habitual voice production, benefiting from use of negative practice to develop somatosensory awareness  His primary learning modality appears to be tactile versus auditory  Patient demonstrated good learning, but will need practice and additional reinforcement    Instruction of Home Practice:  A revised regimen for home practice was instructed.  I provided an AVS of important notes of today's therapeutic activities to facilitate practice.    ASSESSMENT/PLAN  Progress toward long-term goals:  Adequate but incomplete progress; please see above    Impressions:  IMPRESSIONS: chronic cough   Derrick had a productive session of therapy today, working on progression from straw SOVTs to resonant voice tasks.  He will continue to work on his exercises on a daily basis, and work on incorporating the techniques into his daily activities. Speech therapy continues to be medically necessary for Derrick to participate fully and engage in activities of daily living.     Plan:   I will see Derrick in 2 weeks and will plan to expand target pitch SOVT (123Hz) to humming and chanted M syllable tasks with large  straw. Add some jaw release/massage. Attempt manual laryngeal release, continue small straw sip and blow to stop cough.     For home practice goals, see AVS.     TOTAL SERVICE TIME: 60 minutes  TREATMENT (15499)    Milagros Landa (voice), M.S., CCC-SLP  Speech-Language Pathologist  [facility]  [phone]  [email or web address]  Pronouns: she/her/hers      *this report was created in part through the use of computerized dictation software, and though reviewed following completion, some typographic errors may persist.  If there is confusion regarding any of this notes contents, please contact me for clarification    OBJECTIVE FINDINGS  PATIENT REPORTED MEASURES  Patient Supplied Answers To Last 2 VHI Questionnaires       No data to display                   Patient Supplied Answers To Last 2 CSI Questionnaires       No data to display                   Patient Supplied Answers To Last 2 EAT Questionnaires       No data to display                  Patient Supplied Answers to Dyspnea Index Questionnaire:       No data to display

## 2024-05-23 NOTE — LETTER
5/23/2024       RE: Aris Colon  [address] [place] [id number]     Dear Colleague,    Thank you for referring your patient, Aris Colon, to the [facility] [place] at [facility]. Please see a copy of my visit note below.    [facility]  PROGRESS REPORT (CPT 71876)    Patient: Aris Colon  Date of Service: 5/23/2024  Date of Last Service: 5/6/24  Number of Visits: 10/18 approved  Referring Physician: Dr. Kathy De Paz    Impressions from evaluation on 11/17/23 by Mark Dutta M.S., CCC-SLP:  Derrick Colon presents today with ongoing chronic cough which began in conjunction with workplace particle exposure, and with recent exacerbation including hemoptysis and emesis.  Laryngeal evaluation today was warranted given recent episodes of hemoptysis to ensure the patient was not recovering from hemorrhagic or ulcerative event.  This demonstrated no acute trauma which is fortunate, though diffuse change to the vocal folds and surrounding tissues from longstanding cough was seen consistent with previous evaluation if slightly worsened.  Cough response was aggressive to the application of oxymetazoline nasal spray, and guarding and light cough was noted to occur even prior to the administration, which underscores both the hypersensitivity as well as the stress base response to triggers that has developed over time.  Within the exam itself patient was able to attenuate cough through focused relaxation which bodes well for his ability to incorporate therapeutic strategies to reduce cough, though as we discussed full resolution of symptoms is often challenging due to their multifactorial nature especially in the case of irritant exposure.        SUBJECTIVE:  Since Derrick's last session, he reports the following:   He had a consult with Dr. Chapa and reports he has a better understanding of the  superior laryngeal nerve block.   He vomited 3x in the last two weeks, triggered from food smell (buffalo wild wings), incense at a , and perfume.     OBJECTIVE:  Patient Specific Goal Metrics:      2024    11:00 AM 3/21/2024    12:00 PM 2024    12:00 PM   Dysponia SLP Goals   How severe is your cough /throat clearing, if 0 is no cough at all and 10 is the worst cough? 10 7 8   How much does your cough/throat-clearing problem bother you?            Very Much Quite a bit Very Much   FO: 92-96Hz  Target FO: 116Hz    THERAPEUTIC ACTIVITIES  Today Derrick participated in the following therapeutic activities:  Counseling and Education:  Asked questions about the nature of his symptoms, and I answered all of these thoroughly.    I provided Derrick with explanation and skilled instruction of:  Exercises to coordinate phonation with optimal flowing airstream and reduce phonatory impact.   Semi-occluded vocal tract exercises transitioning into M initial speech tasks were most facilitating  He progressed through the hum and syllable level of phonatory complexity  He demonstrated fair ability to self-monitor and identify target voice production versus habitual voice production, benefiting from use of negative practice to develop somatosensory awareness  His primary learning modality appears to be tactile versus auditory  Patient demonstrated good learning, but will need practice and additional reinforcement    Instruction of Home Practice:  A revised regimen for home practice was instructed.  I provided an AVS of important notes of today's therapeutic activities to facilitate practice.    ASSESSMENT/PLAN  Progress toward long-term goals:  Adequate but incomplete progress; please see above    Impressions:  IMPRESSIONS: chronic cough   Derrick had a productive session of therapy today, working on progression from straw SOVTs to resonant voice tasks.  He will continue to work on his exercises on a daily basis, and work  on incorporating the techniques into his daily activities. Speech therapy continues to be medically necessary for Derrick to participate fully and engage in activities of daily living.     Plan:   I will see Derrick in 2 weeks and will plan to expand target pitch SOVT (123Hz) to humming and chanted M syllable tasks with large straw. Add some jaw release/massage. Attempt manual laryngeal release, continue small straw sip and blow to stop cough.     For home practice goals, see AVS.     TOTAL SERVICE TIME: 60 minutes  TREATMENT (79970)    Milagros Landa (voice), M.S., CCC-SLP  Speech-Language Pathologist  [facility]  [phone]  [email or web address]  Pronouns: she/her/hers      *this report was created in part through the use of computerized dictation software, and though reviewed following completion, some typographic errors may persist.  If there is confusion regarding any of this notes contents, please contact me for clarification    OBJECTIVE FINDINGS  PATIENT REPORTED MEASURES  Patient Supplied Answers To Last 2 VHI Questionnaires       No data to display                   Patient Supplied Answers To Last 2 CSI Questionnaires       No data to display                   Patient Supplied Answers To Last 2 EAT Questionnaires       No data to display                  Patient Supplied Answers to Dyspnea Index Questionnaire:       No data to display                   Again, thank you for allowing me to participate in the care of your patient.      Sincerely,    Ora Shepard, SLP

## 2024-05-23 NOTE — PATIENT INSTRUCTIONS
"Hello!     Please complete the exercises below and remember, a few minutes of practice many times throughout the day is more important than one large practice session. If you have any questions about your strategies, feel free to contact me at [email or web address] or via [employer]. Please call [phone] for scheduling alterations or to be seen sooner in case of cancellations.      Remember that your new goal is to KEEP THE AIR IN!!! Getting everything out leads to compression of your body, which is what makes you vomit and keep coughing.     Home Exercise Program:  1. Shoulders back/chest lifted!  2. Sip in & blow out with small straw  3. Repeat as needed, even if you're only getting little snippets of the exercise between coughs.      VOICE EXERCISES (1x/hour)  -- Inhale SILENTLY and feel your belly fill with air  -- Slowly exhale as your belly contracts, ROUND \"OO\" LIPS  -- Skinny Straw and 1 inch of water in a cup  -- \"Whoooooo\" (smooth bubbles like a motor boat or a simmering pot of water)  3x silently with just air for 5-7 seconds  5x steady pitch on 123 Hz or B2 (use the deanna)  3x sliding up and down on \"whoooo\" with bubbles  5 sentences, bubbling on \"whooo\" with the pitch pattern you would use when speaking.   -- Double-check that your LIPS are ROUNDED, chest lifted     BUZZY VOICE  (Periodically throughout the day)  Hold the large straw to rounded lips  Hum on a buzzy \"hmmmm\" on 123Hz or B2   Then chant/sing on 123Hz or B2:   Mmmememe  Mmmamama  Mmmomomo  Mmmoomoomoo  Mmmaymaymay  Mmmymymy  The goal is to feel a little bit of buzzing/tickle/vibration on your lips/nose/tongue/straw- somewhere above your chin.      Download the deanna Pitch Perfect (Gray background/White Music Symbols)  Open the deanna  Tap the second from the bottom left option \"Notes\"  Scroll until you find 123Hz or B2  Tap that line to hear your Target Pitch  Feel the air flow      ____________________________________________________   " "  OLDER NOTES BELOW:      SILENT BREATHING STRATEGIES   These help to relax the throat and keep things calm. Noise, particularly higher pitch noise, as you inhale means you've likely tightened somewhere. The more noisy and tight your inhalation is, the more likely you'll start coughing.      Pick 1 inhalation and 1 exhalation, then repeat back and forth until your throat feels calmer.   INHALATION: EXHALATION:   1. Tongue tuck  2. Sniff  3. Puppy Yawn  4. Straw  5. Rounded lip  6. Lip Whistle or Kazoo 1. Tongue tuck  2. Ballooned cheeks  3. Fist balloon  4. Shhhhhhh  5. Sh sh sh sh sh...  6. Lip Whistle or Kazoo      Adding Voice:  For 2-3 minutes, here and there throughout the day, use your lip whistle or kazoo:   1. Inhale silently through the lip whistle or kazoo  2. Gently sigh out, letting air flow through the lip whistle or kazoo, adding your voice on \"Whoooooo\"  3. Play with pitch glides, gliding down, gliding up, gliding up and down like a siren  4. Double check that you hear the buzz/spin of the lip whistle/kazoo      ____________________________________________________     Sternocleidomastoid  1. Your SCM, or sternocleidomastoid, is the muscle that attaches to the bony bump behind each each and courses downward and slightly forward along your neck, attaching at the other end to your clavicle and sternum. If you turn your head to the side, you can feel the SCM muscle sticking out.   2. With your head facing forward, use the flat part of your thumb and pointer to gently pinch, knead, and massage the SCM on ONE SIDE at a time.   3. You can gently pinch and hold the SCM, or rub your thumb and pointer back and forth to knead the SCM, or gently jiggle the SCM forward and backward. Eventually add deep inhalations or sighs so that your SCM learns to also relax in the context of speaking/singing.   4. Now switch to the other side and repeat.   5. Remember that you never want to use more than 3 or 4/10 intensity. " "It's better to be gentle and repeat this strategy several times a day than to be aggressive and cause the SCM to tense up more in response to discomfort. If you get dizzy or feel a strong pulse, please move your finger or wait until we can review this.      Lateral Laryngeal Stretch:  1. Position your hands like praying, then rotate both sets of fingers toward yourself and place them along each side of your marco's apple. OR, place your thumb and two fingers on each side of your marco's apple.   2. Gentle shift your marco's apple side-to-side, only about a centimeter in each direction.   3. Decide which direction feels easier.   4. Shift your marco's apple about a centimeter in the easier direction and hold, breathing slow, deep breaths for about 120 seconds.   5. Shift the other direction and hold, breathing slow, deep breaths for about 120 seconds.      Downward Laryngeal Stretch:  1. Gently find the groove at the top of your marco's apple, then follow the groove to each side and place a thumb and pointer finger on each side of your marco's apple, within the slight indented groove along the top of each side.   2. Inhale slowly with a yawny sensation, gently pulling downward along your marco's apple (Don't pinch hard, just enough to \"suggest\" a downward movement).   3. Reset your fingers at the top of your larynx and repeat several times.   4. Always be gentle- don't use enough pressure that you would bruise a peach or banana if you were holding one between your fingers.   5. After repeating 3-5 times, add a gentle sigh as you exhale \"Huuuhhhhh\"   ____________________________________________________     BELLY BREATHING  Stretch: (each morning)  Stretch your arms up and take two slow, deep breaths, feeling your rib cage lift and stretch.    Then lean toward each side, taking 2 slow, deep breaths on each side, feeling your ribcage lift and expand along the sides.   Now leaning back, arching your back to feel a stretch along " "the front of your ribs, and take 2 more slow, deep breaths.    Lastly, lean forward and allow your back and neck to stretch and take 2 slow, deep breaths.      Practice: (5 breaths per hour or 10 breaths at each meal)  Sit hinged forward with your elbows on your knees. OR Sit normally and place your hands on either side of your belly and back or low ribs  If someone is nearby to help, they can place their hands on the sides of your back and ribs.   Slowly breathe in and feel your belly and back expand, like filling a balloon, pushing out against your waistband  Slowly breathe out on SHHHHH and feel your belly and back crunch inward, like zipping tight pants  Repeat slowly and take breaks if you get dizzy     HELPFUL HINTS:  -- Tie a string or piece of elastic around your low ribs when you get dressed. You'll be able to feel yourself expand against this as you breathe all day.   -- Some people find it easiest to practice while laying on their back or in a recliner, hands on their belly.  -- You can also lay on your stomach with your arms over your head to feel your back expand. It's not unusual for people to struggle noticing their back expanding to start with.      WHEN YOU COUGH:   Keep your sternum lifted  Make the abs crunch up and in, like someone punching you in the gut.   ____________________________________________________      COUGH STOPPER  1. Press your fist against your lips  2. Aim to balloon your cheeks and throat as you cough  3. ONLY breathe back in through your nose- inhaling through your mouth triggers more coughing  4. Keep repeating the balloon and sniff, balloon and sniff, until the spasm stops.     If you are willing, teach this to friends and family so that when you start coughing, someone next to you can say \"Make a fist! Balloon! Balloon!\"  OR put up post-it note reminders for yourself        Thank you and have a great day!  Rosendo Marmolejo. (voice), M.S., CCC-SLP  Speech-Language " Pathologist  [facility]  [phone]  [email or web address]  Pronouns: she/her/hers

## 2024-05-23 NOTE — TELEPHONE ENCOUNTER
Patient confirmed scheduled appointment:  Date: 6/11  Time: 7:30am  Visit type: Return   Provider: Dr. Chapa   Location: [facility]  Testing/imaging:   Additional notes:

## 2024-06-09 NOTE — PROGRESS NOTES
HISTORY OF PRESENT ILLNESS: Aris Colon is a 54 year old male with a history of chronic cough. This is following a chemical exposure.  He was most recently seen on 5/14/2024.  Please see that note for a more complete history.  At that time we discussed the possibility of a superior laryngeal nerve block.  He wished to think about this.  He returns today for consideration of a superior laryngeal nerve block.  As expected his symptoms have been roughly the same although slightly worse with the increased humidity over the last few days.          PAST MEDICAL HISTORY: No past medical history on file.    PAST SURGICAL HISTORY: No past surgical history on file.    FAMILY HISTORY: No family history on file.    SOCIAL HISTORY:   Social History     Tobacco Use    Smoking status: Never     Passive exposure: Never    Smokeless tobacco: Never   Substance Use Topics    Alcohol use: Not on file       REVIEW OF SYSTEMS: Ten point review of systems was performed and is negative except for:       11/17/2023     8:05 AM    ENT ROS   Constitutional Weight gain    Appetite change    Problems with sleep   Ears, Nose, Throat Nasal congestion or drainage        ALLERGIES: Patient has no known allergies.    MEDICATIONS:   Current Outpatient Medications   Medication Sig Dispense Refill    albuterol (PROAIR HFA/PROVENTIL HFA/VENTOLIN HFA) 108 (90 Base) MCG/ACT inhaler 2 puffs every 4 hours as needed      benzonatate (TESSALON) 100 MG capsule Take 1 capsule (100 mg) by mouth 3 times daily as needed for cough 30 capsule 4    FLUoxetine (PROZAC) 20 MG capsule       fluticasone-vilanterol (BREO ELLIPTA) 100-25 MCG/ACT inhaler       hydrOXYzine (ATARAX) 10 MG tablet       LANsoprazole (PREVACID) 30 MG DR capsule Take 30 mg by mouth      omeprazole (PRILOSEC) 20 MG DR capsule TAKE 2 CAPSULES(40 MG) BY MOUTH TWICE DAILY      pantoprazole (PROTONIX) 40 MG EC tablet Take 1 tablet (40 mg) by mouth 2 times daily 180 tablet 2     sildenafil (REVATIO) 20 MG tablet TAKE 1-5 TABLETS BY MOUTH 30 MINS TO 4 HOURS BEFORE SEXUAL ACTIVITY      tiotropium (SPIRIVA RESPIMAT) 2.5 MCG/ACT inhaler Inhale 2 puffs into the lungs           PHYSICAL EXAMINATION:  He  is awake, alert and in no apparent distress.    His tympanic membranes are clear and intact bilaterally. External auditory canals are clear.  Nasal exam shows a mild septal deviation without obstruction.  Examination of the oral cavity shows no suspicious lesions.  There is symmetric movement of the tongue and soft palate.    The oropharynx is clear.  His neck is supple without significant adenopathy.  Pulse is regular.  Upper airway is clear.  Cranial nerves II-XII are grossly intact.       IMPRESSION/PLAN: Chronic cough.  He is interested in a superior laryngeal nerve block.    Superior laryngeal nerve block (CPT 00503)     Pre-procedure diagnosis: Chronic cough  Post-procedure diagnosis: Same  Indication for procedure: Aris Colon is a 54 year old male with  chronic cough  Procedure(s): right side superior laryngeal nerve block injection with  1cc of 50:50 solution of triamcinolone acetonide 200 mg/5 mL and 0.5% bupivacaine into the Right thyrohyoid space  Details of Procedure: Informed consent was obtained and the patient was told that there is a small possibility of pain, discomfort, dysphagia, dysphonia and blindness or stroke. The SLN block was performed in the clinic with the patient seated upright in the examination chair. 1.0 milliliters of a 50:50 solution of triamcinolone acetonide (Kenolog)   40mg/ml and a local anesthetic ( 0.5% bupivacaine) were injected at the entry point of the internal branch of the superior laryngeal nerve in the posterior thyrohyoid membrane via a 27-gauge needle. A  total of dose of 20 mg of triamcinolone acetonide was injected .The superior thyroid tubercle and the greater horn of the hyoid bone were used as landmarks to identify the entry  point after aspirating confirmed the needle was not endovascular.     Anesthesia type: none        Estimated Blood Loss: minimal  Complications: None  Disposition: Patient tolerated the procedure well     PLAN: I will have  him follow up with Dr. De Paz at the previously scheduled time on July 9, 2024.

## 2024-06-11 ENCOUNTER — OFFICE VISIT (OUTPATIENT)
Dept: OTOLARYNGOLOGY | Facility: CLINIC | Age: 54
End: 2024-06-11
Payer: OTHER MISCELLANEOUS

## 2024-06-11 VITALS — HEIGHT: 70 IN | BODY MASS INDEX: 30.06 KG/M2 | WEIGHT: 210 LBS

## 2024-06-11 DIAGNOSIS — R05.3 CHRONIC COUGH: Primary | ICD-10-CM

## 2024-06-11 PROCEDURE — 64408 NJX AA&/STRD VAGUS NRV: CPT | Performed by: OTOLARYNGOLOGY

## 2024-06-11 PROCEDURE — 99212 OFFICE O/P EST SF 10 MIN: CPT | Mod: 25 | Performed by: OTOLARYNGOLOGY

## 2024-06-11 NOTE — LETTER
6/11/2024       RE: Aris Colon  [address] [place] [id number]     Dear Colleague,    Thank you for referring your patient, Aris Colon, to the [facility] [place] at [facility]. Please see a copy of my visit note below.    HISTORY OF PRESENT ILLNESS: Aris Colon is a 54 year old male with a history of chronic cough. This is following a chemical exposure.  He was most recently seen on 5/14/2024.  Please see that note for a more complete history.  At that time we discussed the possibility of a superior laryngeal nerve block.  He wished to think about this.  He returns today for consideration of a superior laryngeal nerve block.  As expected his symptoms have been roughly the same although slightly worse with the increased humidity over the last few days.          PAST MEDICAL HISTORY: No past medical history on file.    PAST SURGICAL HISTORY: No past surgical history on file.    FAMILY HISTORY: No family history on file.    SOCIAL HISTORY:   Social History     Tobacco Use     Smoking status: Never     Passive exposure: Never     Smokeless tobacco: Never   Substance Use Topics     Alcohol use: Not on file       REVIEW OF SYSTEMS: Ten point review of systems was performed and is negative except for:       11/17/2023     8:05 AM    ENT ROS   Constitutional Weight gain    Appetite change    Problems with sleep   Ears, Nose, Throat Nasal congestion or drainage        ALLERGIES: Patient has no known allergies.    MEDICATIONS:   Current Outpatient Medications   Medication Sig Dispense Refill     albuterol (PROAIR HFA/PROVENTIL HFA/VENTOLIN HFA) 108 (90 Base) MCG/ACT inhaler 2 puffs every 4 hours as needed       benzonatate (TESSALON) 100 MG capsule Take 1 capsule (100 mg) by mouth 3 times daily as needed for cough 30 capsule 4     FLUoxetine (PROZAC) 20 MG capsule         fluticasone-vilanterol (BREO ELLIPTA) 100-25 MCG/ACT inhaler        hydrOXYzine (ATARAX) 10 MG tablet        LANsoprazole (PREVACID) 30 MG DR capsule Take 30 mg by mouth       omeprazole (PRILOSEC) 20 MG DR capsule TAKE 2 CAPSULES(40 MG) BY MOUTH TWICE DAILY       pantoprazole (PROTONIX) 40 MG EC tablet Take 1 tablet (40 mg) by mouth 2 times daily 180 tablet 2     sildenafil (REVATIO) 20 MG tablet TAKE 1-5 TABLETS BY MOUTH 30 MINS TO 4 HOURS BEFORE SEXUAL ACTIVITY       tiotropium (SPIRIVA RESPIMAT) 2.5 MCG/ACT inhaler Inhale 2 puffs into the lungs           PHYSICAL EXAMINATION:  He  is awake, alert and in no apparent distress.    His tympanic membranes are clear and intact bilaterally. External auditory canals are clear.  Nasal exam shows a mild septal deviation without obstruction.  Examination of the oral cavity shows no suspicious lesions.  There is symmetric movement of the tongue and soft palate.    The oropharynx is clear.  His neck is supple without significant adenopathy.  Pulse is regular.  Upper airway is clear.  Cranial nerves II-XII are grossly intact.       IMPRESSION/PLAN: Chronic cough.  He is interested in a superior laryngeal nerve block.    Superior laryngeal nerve block (CPT 30003)     Pre-procedure diagnosis: Chronic cough  Post-procedure diagnosis: Same  Indication for procedure: Aris Colon is a 54 year old male with  chronic cough  Procedure(s): right side superior laryngeal nerve block injection with  1cc of 50:50 solution of triamcinolone acetonide 200 mg/5 mL and 0.5% bupivacaine into the Right thyrohyoid space  Details of Procedure: Informed consent was obtained and the patient was told that there is a small possibility of pain, discomfort, dysphagia, dysphonia and blindness or stroke. The SLN block was performed in the clinic with the patient seated upright in the examination chair. 1.0 milliliters of a 50:50 solution of triamcinolone acetonide (Kenolog)   40mg/ml and a  local anesthetic ( 0.5% bupivacaine) were injected at the entry point of the internal branch of the superior laryngeal nerve in the posterior thyrohyoid membrane via a 27-gauge needle. A  total of dose of 20 mg of triamcinolone acetonide was injected .The superior thyroid tubercle and the greater horn of the hyoid bone were used as landmarks to identify the entry point after aspirating confirmed the needle was not endovascular.     Anesthesia type: none        Estimated Blood Loss: minimal  Complications: None  Disposition: Patient tolerated the procedure well     PLAN: I will have  him follow up with Dr. De Paz at the previously scheduled time on July 9, 2024.                       Again, thank you for allowing me to participate in the care of your patient.      Sincerely,    Bryson Chapa MD

## 2024-06-11 NOTE — PATIENT INSTRUCTIONS
1.  You were seen in the ENT Clinic today by . If you have any questions or concerns after your appointment, please call [phone]. Press option #1 for scheduling related needs. Press option #3 for Nurse advice.    2.   has recommended the following:   - return as scheduled to see Lucia on 7/9/24    3.  Plan is to return to clinic as needed      Ebony Valerio LPN  [phone]  [facility] - Otolaryngology

## 2024-06-13 ENCOUNTER — OFFICE VISIT (OUTPATIENT)
Dept: OTOLARYNGOLOGY | Facility: CLINIC | Age: 54
End: 2024-06-13
Payer: OTHER MISCELLANEOUS

## 2024-06-13 DIAGNOSIS — R05.3 CHRONIC COUGH: Primary | ICD-10-CM

## 2024-06-13 PROCEDURE — 92507 TX SP LANG VOICE COMM INDIV: CPT | Mod: GN | Performed by: SPEECH-LANGUAGE PATHOLOGIST

## 2024-06-13 NOTE — PATIENT INSTRUCTIONS
"Hello!     Please complete the exercises below and remember, a few minutes of practice many times throughout the day is more important than one large practice session. If you have any questions about your strategies, feel free to contact me at [email or web address] or via [email or web address]. Please call [phone] for scheduling alterations or to be seen sooner in case of cancellations.      Remember that your new goal is to KEEP THE AIR IN!!! Getting everything out leads to compression of your body, which is what makes you vomit and keep coughing.     Home Exercise Program:  1. Shoulders back/chest lifted!  2. Sip in & blow out with small straw  3. Repeat as needed, even if you're only getting little snippets of the exercise between coughs.      VOICE EXERCISES (1x/hour)  -- Inhale SILENTLY and feel your belly fill with air  -- Slowly exhale as your belly contracts, ROUND \"OO\" LIPS  -- Skinny Straw and 1 inch of water in a cup  -- \"Whoooooo\" (smooth bubbles like a motor boat or a simmering pot of water)  3x silently with just air for 5-7 seconds  5x steady pitch on 123 Hz or B2 (use the deanna)  3x sliding up and down on \"whoooo\" with bubbles  5 sentences, bubbling on \"whooo\" with the pitch pattern you would use when speaking.   -- Double-check that your LIPS are ROUNDED, chest lifted     BUZZY VOICE  (Periodically throughout the day)  Hold the large straw to rounded lips  Hum on a buzzy \"hmmmm\" on 123Hz or B2   Then chant/sing on 123Hz or B2:   Mmmememe  Mmmamama  Mmmomomo  Mmmoomoomoo  Mmmaymaymay  Mmmymymy  Words: mom, moon, man,   The goal is to feel a little bit of buzzing/tickle/vibration on your lips/nose/tongue/straw- somewhere above your chin.      Download the deanna Pitch Perfect (Gray background/White Music Symbols)  Open the deanna  Tap the second from the bottom left option \"Notes\"  Scroll until you find 123Hz or B2  Tap that line to hear your Target Pitch  Feel the air flow   BUZZY VOICE  Practice throughout " "the day, whenever you get a chance  1. 3x  whooommm  or \"hmmm\" for 5 seconds   Focus on \"nasal\" vibrations in your lips/nose on \"mmm\"  Create \"molar space\" to relax your jaw open slightly  2. Moomoomoo, moemoemoe, mememe, mamama, mymymy, maymaymay, mehmehmeh  3. Valera, mom, mean, man, mine, moan, men, main, morning, meaning, manners, mammals  4. Sentences:  Mother knits many mittens. Filipe makes much money. Nobody knows Alejandra s name.  Magaly s measles made Magaly miserable. Milking machines make much noise. Mild-mannered Kylie  sunday Rodrigues. Kelvin marched many, many miles. Millions made money in the market in March. My mother munches melon on Mondays.Maybe my memory needs mending. Genesis made more money. Magaly made many muffins. Merlin motored many miles. Kelvin makes major messes. My momma made meatloaf. Music makes mellow moods.  5.  Mmmy favorite ... is ...  or \"nnnI like...\"   6. Reading out loud, starting after each breath with a \"hmm...\"  7. Conversation, narrating yourself, road signs, license plates, etc.   Chanted on 1 pitch for now, like a monk or robot.     Fabián supposes his toeses are roses, but Fabián supposes erroneously. Fabián he knowses his toeses aren't roses as Fabián supposes his toeses to be.        ____________________________________________________     OLDER NOTES BELOW:      SILENT BREATHING STRATEGIES   These help to relax the throat and keep things calm. Noise, particularly higher pitch noise, as you inhale means you've likely tightened somewhere. The more noisy and tight your inhalation is, the more likely you'll start coughing.      Pick 1 inhalation and 1 exhalation, then repeat back and forth until your throat feels calmer.   INHALATION: EXHALATION:   1. Tongue tuck  2. Sniff  3. Puppy Yawn  4. Straw  5. Rounded lip  6. Lip Whistle or Kazoo 1. Tongue tuck  2. Ballooned cheeks  3. Fist balloon  4. Shhhhhhh  5. Sh sh sh sh sh...  6. Lip Whistle or Kazoo      Adding Voice:  For 2-3 minutes, " "here and there throughout the day, use your lip whistle or kazoo:   1. Inhale silently through the lip whistle or kazoo  2. Gently sigh out, letting air flow through the lip whistle or kazoo, adding your voice on \"Whoooooo\"  3. Play with pitch glides, gliding down, gliding up, gliding up and down like a siren  4. Double check that you hear the buzz/spin of the lip whistle/kazoo      ____________________________________________________     Sternocleidomastoid  1. Your SCM, or sternocleidomastoid, is the muscle that attaches to the bony bump behind each each and courses downward and slightly forward along your neck, attaching at the other end to your clavicle and sternum. If you turn your head to the side, you can feel the SCM muscle sticking out.   2. With your head facing forward, use the flat part of your thumb and pointer to gently pinch, knead, and massage the SCM on ONE SIDE at a time.   3. You can gently pinch and hold the SCM, or rub your thumb and pointer back and forth to knead the SCM, or gently jiggle the SCM forward and backward. Eventually add deep inhalations or sighs so that your SCM learns to also relax in the context of speaking/singing.   4. Now switch to the other side and repeat.   5. Remember that you never want to use more than 3 or 4/10 intensity. It's better to be gentle and repeat this strategy several times a day than to be aggressive and cause the SCM to tense up more in response to discomfort. If you get dizzy or feel a strong pulse, please move your finger or wait until we can review this.      Lateral Laryngeal Stretch:  1. Position your hands like praying, then rotate both sets of fingers toward yourself and place them along each side of your marco's apple. OR, place your thumb and two fingers on each side of your marco's apple.   2. Gentle shift your marco's apple side-to-side, only about a centimeter in each direction.   3. Decide which direction feels easier.   4. Shift your marco's " "apple about a centimeter in the easier direction and hold, breathing slow, deep breaths for about 120 seconds.   5. Shift the other direction and hold, breathing slow, deep breaths for about 120 seconds.      Downward Laryngeal Stretch:  1. Gently find the groove at the top of your marco's apple, then follow the groove to each side and place a thumb and pointer finger on each side of your marco's apple, within the slight indented groove along the top of each side.   2. Inhale slowly with a yawny sensation, gently pulling downward along your marco's apple (Don't pinch hard, just enough to \"suggest\" a downward movement).   3. Reset your fingers at the top of your larynx and repeat several times.   4. Always be gentle- don't use enough pressure that you would bruise a peach or banana if you were holding one between your fingers.   5. After repeating 3-5 times, add a gentle sigh as you exhale \"Huuuhhhhh\"   ____________________________________________________     BELLY BREATHING  Stretch: (each morning)  Stretch your arms up and take two slow, deep breaths, feeling your rib cage lift and stretch.    Then lean toward each side, taking 2 slow, deep breaths on each side, feeling your ribcage lift and expand along the sides.   Now leaning back, arching your back to feel a stretch along the front of your ribs, and take 2 more slow, deep breaths.    Lastly, lean forward and allow your back and neck to stretch and take 2 slow, deep breaths.      Practice: (5 breaths per hour or 10 breaths at each meal)  Sit hinged forward with your elbows on your knees. OR Sit normally and place your hands on either side of your belly and back or low ribs  If someone is nearby to help, they can place their hands on the sides of your back and ribs.   Slowly breathe in and feel your belly and back expand, like filling a balloon, pushing out against your waistband  Slowly breathe out on [facility] and feel your belly and back crunch inward, like zipping " "tight pants  Repeat slowly and take breaks if you get dizzy     HELPFUL HINTS:  -- Tie a string or piece of elastic around your low ribs when you get dressed. You'll be able to feel yourself expand against this as you breathe all day.   -- Some people find it easiest to practice while laying on their back or in a recliner, hands on their belly.  -- You can also lay on your stomach with your arms over your head to feel your back expand. It's not unusual for people to struggle noticing their back expanding to start with.      WHEN YOU COUGH:   Keep your sternum lifted  Make the abs crunch up and in, like someone punching you in the gut.   ____________________________________________________      COUGH STOPPER  1. Press your fist against your lips  2. Aim to balloon your cheeks and throat as you cough  3. ONLY breathe back in through your nose- inhaling through your mouth triggers more coughing  4. Keep repeating the balloon and sniff, balloon and sniff, until the spasm stops.     If you are willing, teach this to friends and family so that when you start coughing, someone next to you can say \"Make a fist! Balloon! Balloon!\"  OR put up post-it note reminders for yourself        Thank you and have a great day!  Rosendo Marmolejo. (voice) M.S., CCC-SLP  Speech-Language Pathologist  [facility]  [phone]  [email or web address]  Pronouns: she/her/hers  "

## 2024-06-13 NOTE — PROGRESS NOTES
[facility] VOICE CLINIC  PROGRESS REPORT (CPT 06300)    Patient: Aris Colon  Date of Service: 6/13/2024  Date of Last Service: 5/23/24  Number of Visits: 11/18 approved  Referring Physician: Dr. Kathy De Paz    Impressions from evaluation on 11/17/23 by Mark Dutta M.S., CCC-SLP:  Derrick Colon presents today with ongoing chronic cough which began in conjunction with workplace particle exposure, and with recent exacerbation including hemoptysis and emesis.  Laryngeal evaluation today was warranted given recent episodes of hemoptysis to ensure the patient was not recovering from hemorrhagic or ulcerative event.  This demonstrated no acute trauma which is fortunate, though diffuse change to the vocal folds and surrounding tissues from longstanding cough was seen consistent with previous evaluation if slightly worsened.  Cough response was aggressive to the application of oxymetazoline nasal spray, and guarding and light cough was noted to occur even prior to the administration, which underscores both the hypersensitivity as well as the stress base response to triggers that has developed over time.  Within the exam itself patient was able to attenuate cough through focused relaxation which bodes well for his ability to incorporate therapeutic strategies to reduce cough, though as we discussed full resolution of symptoms is often challenging due to their multifactorial nature especially in the case of irritant exposure.      SUBJECTIVE:  Since Derrick's last session, he reports the following:   He recently underwent a right side superior laryngeal nerve block injection with Dr. Chapa on 6/11.  He hasn't noticed any improvement with the SLN block so far.   He's thrown up 4x since his last appointment 3 weeks ago. One was cold air at night, food smells, cologne, and increased humidity after raining.   The carpets in the apartment hallways were shampooed the other day, with fans blowing the fumes  "everywhere, which set off a really bad coughing fit.   The smell of fresh cut grass triggered him.       OBJECTIVE:  Patient Specific Goal Metrics:      1/25/2024    11:00 AM 3/21/2024    12:00 PM 4/4/2024    12:00 PM   Dysponia SLP Goals   How severe is your cough /throat clearing, if 0 is no cough at all and 10 is the worst cough? 10 7 8   How much does your cough/throat-clearing problem bother you?            Very Much Quite a bit Very Much     FO: 92-96Hz    THERAPEUTIC ACTIVITIES  Today Derrick participated in the following therapeutic activities:  Counseling and Education:  Asked questions about the nature of his symptoms, and I answered all of these thoroughly.    I provided Derrick with explanation and skilled instruction of:  Resonant Voice Therapy (RVT) exercises to promote forward locus of resonance and optimized pattern of laryngeal adduction  Easy descending glide on /m/ utilized in conjunction with relaxed jaw, tongue, and lightly closed lips to facilitate forward resonant sound  Use of the carrier phrase \"mmhmm\" instructed to promote generalization to everyday speech  Word level exercises featuring nasal continuant loaded stimuli  Special care was taken to maintain sense of open pharyngeal space for broadened resonance in conjunction with forward resonant principles  Use of inhalation to \"set the instrument\" for the remainder of the statement was facilitating.  Phrase level exercises featuring nasal continuants in more complex phonemic contexts were employed  Good accuracy with moderate support  Tendency towards lower than optimal pitch    Instruction of Home Practice:  A revised regimen for home practice was instructed.  I provided an AVS of important notes of today's therapeutic activities to facilitate practice.    ASSESSMENT/PLAN  Progress toward long-term goals:  Adequate but incomplete progress; please see above    Impressions:  IMPRESSIONS: chronic cough   Derrick had a productive session of therapy " today, working on modification of fundamental frequency in combination with forward resonance.  He will continue to work on his exercises on a daily basis, and work on incorporating the techniques into his daily activities. Speech therapy continues to be medically necessary for Derrick to participate fully and engage in activities of daily living.     Plan:   I will see Derrick in 4 weeks and will plan to continue target pitch SOVT (123Hz) progressing from M word level. Add some jaw release/massage. Attempt manual laryngeal release, continue small straw sip and blow to stop cough.     For home practice goals, see AVS.     TOTAL SERVICE TIME: 55 minutes  TREATMENT ([id number])    Milagros Landa (voice) MNikolaiS., CCC-SLP  Speech-Language Pathologist  [facility]  [phone]  [email or web address]  Pronouns: she/her/hers      *this report was created in part through the use of computerized dictation software, and though reviewed following completion, some typographic errors may persist.  If there is confusion regarding any of this notes contents, please contact me for clarification    OBJECTIVE FINDINGS  PATIENT REPORTED MEASURES  Patient Supplied Answers To Last 2 VHI Questionnaires       No data to display                   Patient Supplied Answers To Last 2 CSI Questionnaires       No data to display                   Patient Supplied Answers To Last 2 EAT Questionnaires       No data to display                  Patient Supplied Answers to Dyspnea Index Questionnaire:       No data to display

## 2024-06-13 NOTE — LETTER
6/13/2024       RE: Aris Colon  [address] [place] [id number]     Dear Colleague,    Thank you for referring your patient, Aris Colon, to the [facility] VOICE CLINIC [place] [facility]. Please see a copy of my visit note below.    [facility]  PROGRESS REPORT (CPT 18733)    Patient: Aris Colon  Date of Service: 6/13/2024  Date of Last Service: 5/23/24  Number of Visits: 11/18 approved  Referring Physician: Dr. Kathy De Paz    Impressions from evaluation on 11/17/23 by Mark Dutta M.S., CCC-SLP:  Derrick Colon presents today with ongoing chronic cough which began in conjunction with workplace particle exposure, and with recent exacerbation including hemoptysis and emesis.  Laryngeal evaluation today was warranted given recent episodes of hemoptysis to ensure the patient was not recovering from hemorrhagic or ulcerative event.  This demonstrated no acute trauma which is fortunate, though diffuse change to the vocal folds and surrounding tissues from longstanding cough was seen consistent with previous evaluation if slightly worsened.  Cough response was aggressive to the application of oxymetazoline nasal spray, and guarding and light cough was noted to occur even prior to the administration, which underscores both the hypersensitivity as well as the stress base response to triggers that has developed over time.  Within the exam itself patient was able to attenuate cough through focused relaxation which bodes well for his ability to incorporate therapeutic strategies to reduce cough, though as we discussed full resolution of symptoms is often challenging due to their multifactorial nature especially in the case of irritant exposure.      SUBJECTIVE:  Since Derrick's last session, he reports the following:   He recently underwent a right side superior laryngeal nerve block injection with  "Dr. Chapa on 6/11.  He hasn't noticed any improvement with the SLN block so far.   He's thrown up 4x since his last appointment 3 weeks ago. One was cold air at night, food smells, cologne, and increased humidity after raining.   The carpets in the apartment hallways were shampooed the other day, with fans blowing the fumes everywhere, which set off a really bad coughing fit.   The smell of fresh cut grass triggered him.       OBJECTIVE:  Patient Specific Goal Metrics:      1/25/2024    11:00 AM 3/21/2024    12:00 PM 4/4/2024    12:00 PM   Dysponia SLP Goals   How severe is your cough /throat clearing, if 0 is no cough at all and 10 is the worst cough? 10 7 8   How much does your cough/throat-clearing problem bother you?            Very Much Quite a bit Very Much     FO: 92-96Hz    THERAPEUTIC ACTIVITIES  Today Derrick participated in the following therapeutic activities:  Counseling and Education:  Asked questions about the nature of his symptoms, and I answered all of these thoroughly.    I provided Derrick with explanation and skilled instruction of:  Resonant Voice Therapy (RVT) exercises to promote forward locus of resonance and optimized pattern of laryngeal adduction  Easy descending glide on /m/ utilized in conjunction with relaxed jaw, tongue, and lightly closed lips to facilitate forward resonant sound  Use of the carrier phrase \"mmhmm\" instructed to promote generalization to everyday speech  Word level exercises featuring nasal continuant loaded stimuli  Special care was taken to maintain sense of open pharyngeal space for broadened resonance in conjunction with forward resonant principles  Use of inhalation to \"set the instrument\" for the remainder of the statement was facilitating.  Phrase level exercises featuring nasal continuants in more complex phonemic contexts were employed  Good accuracy with moderate support  Tendency towards lower than optimal pitch    Instruction of Home Practice:  A revised " regimen for home practice was instructed.  I provided an AVS of important notes of today's therapeutic activities to facilitate practice.    ASSESSMENT/PLAN  Progress toward long-term goals:  Adequate but incomplete progress; please see above    Impressions:  IMPRESSIONS: chronic cough   Derrick had a productive session of therapy today, working on modification of fundamental frequency in combination with forward resonance.  He will continue to work on his exercises on a daily basis, and work on incorporating the techniques into his daily activities. Speech therapy continues to be medically necessary for Derrick to participate fully and engage in activities of daily living.     Plan:   I will see Derrick in 4 weeks and will plan to continue target pitch SOVT (123Hz) progressing from M word level. Add some jaw release/massage. Attempt manual laryngeal release, continue small straw sip and blow to stop cough.     For home practice goals, see AVS.     TOTAL SERVICE TIME: 55 minutes  TREATMENT ([id number])    Milagros Landa (voice), M.S., CCC-SLP  Speech-Language Pathologist  [facility]  [phone]  [email or web address]  Pronouns: she/her/hers      *this report was created in part through the use of computerized dictation software, and though reviewed following completion, some typographic errors may persist.  If there is confusion regarding any of this notes contents, please contact me for clarification    OBJECTIVE FINDINGS  PATIENT REPORTED MEASURES  Patient Supplied Answers To Last 2 VHI Questionnaires       No data to display                   Patient Supplied Answers To Last 2 CSI Questionnaires       No data to display                   Patient Supplied Answers To Last 2 EAT Questionnaires       No data to display                  Patient Supplied Answers to Dyspnea Index Questionnaire:       No data to display                   Again, thank you for allowing me to participate in the care of your  patient.      Sincerely,    Ora Shepard, SLP

## 2024-06-18 ENCOUNTER — TELEPHONE (OUTPATIENT)
Dept: OTOLARYNGOLOGY | Facility: CLINIC | Age: 54
End: 2024-06-18
Payer: COMMERCIAL

## 2024-06-18 NOTE — TELEPHONE ENCOUNTER
Spoke to patient in regards to symptoms he is experiencing. Writer provided information as per provider, to follow up with original provider pt saw for his cough issues for further recommendations since the nerve block injection did not have a positive affect on his cough. Pt is wanting some further guidance at this point since he is coughing daily to the point of vomiting. He states he has vomited 5 times between last Thursday and now. Pt states he is not having any trouble swallowing. He states everything triggers his cough and his exercises/straw breathing techniques are not helping. Writer advised patient she would relay this information to provider for any further recommendations at this time. Pt verbalized understanding of information given and appreciative of call.

## 2024-06-21 RX ORDER — PROMETHAZINE HYDROCHLORIDE AND CODEINE PHOSPHATE 6.25; 1 MG/5ML; MG/5ML
5 SYRUP ORAL EVERY 8 HOURS PRN
Qty: 75 ML | Refills: 0 | Status: CANCELLED | OUTPATIENT
Start: 2024-06-21 | End: 2024-06-26

## 2024-07-03 NOTE — PROGRESS NOTES
[facility]   at the [facility]   Otolaryngology Clinic     Patient: Aris Colon    MRN: [id number]    : 1970    Age/Gender: 54 year old male  Date of Service: 2024  Rendering Provider:   Kathy De Paz MD     Referring Provider   PCP: Juventino, Mena Núñez  Referring Physician: Referred MD Thong  No address on file  Reason for Consultation   Cough  R SLN block 24  History   HISTORY OF PRESENT ILLNESS: Mr. Colon is a 54 year old male who presents to us today with cough.        he presents today for evaluation. he reports:  Still coughing  Did voice therapy     PAST MEDICAL HISTORY: No past medical history on file.    PAST SURGICAL HISTORY: No past surgical history on file.    CURRENT MEDICATIONS:   Current Outpatient Medications:     albuterol (PROAIR HFA/PROVENTIL HFA/VENTOLIN HFA) 108 (90 Base) MCG/ACT inhaler, 2 puffs every 4 hours as needed, Disp: , Rfl:     benzonatate (TESSALON) 100 MG capsule, Take 1 capsule (100 mg) by mouth 3 times daily as needed for cough, Disp: 30 capsule, Rfl: 4    FLUoxetine (PROZAC) 20 MG capsule, , Disp: , Rfl:     fluticasone-vilanterol (BREO ELLIPTA) 100-25 MCG/ACT inhaler, , Disp: , Rfl:     hydrOXYzine (ATARAX) 10 MG tablet, , Disp: , Rfl:     LANsoprazole (PREVACID) 30 MG DR capsule, Take 30 mg by mouth, Disp: , Rfl:     omeprazole (PRILOSEC) 20 MG DR capsule, TAKE 2 CAPSULES(40 MG) BY MOUTH TWICE DAILY, Disp: , Rfl:     pantoprazole (PROTONIX) 40 MG EC tablet, Take 1 tablet (40 mg) by mouth 2 times daily, Disp: 180 tablet, Rfl: 2    sildenafil (REVATIO) 20 MG tablet, TAKE 1-5 TABLETS BY MOUTH 30 MINS TO 4 HOURS BEFORE SEXUAL ACTIVITY, Disp: , Rfl:     tiotropium (SPIRIVA RESPIMAT) 2.5 MCG/ACT inhaler, Inhale 2 puffs into the lungs, Disp: , Rfl:     ALLERGIES: Patient has no known allergies.    SOCIAL HISTORY:    Social History     Socioeconomic History    Marital status:      Spouse name: Not on file     Number of children: Not on file    Years of education: Not on file    Highest education level: Not on file   Occupational History    Not on file   Tobacco Use    Smoking status: Never     Passive exposure: Never    Smokeless tobacco: Never   Vaping Use    Vaping status: Never Used   Substance and Sexual Activity    Alcohol use: Not on file    Drug use: Not on file    Sexual activity: Not on file   Other Topics Concern    Not on file   Social History Narrative    Not on file     Social Determinants of Health     Financial Resource Strain: High Risk (1/1/2022)    [facility] [email or web address] [facility]    Financial Resource Strain     Difficulty of Paying Living Expenses: Not on file     Difficulty of Paying Living Expenses: Not on file   Food Insecurity: Not on file   Transportation Needs: Not on file   Physical Activity: Not on file   Stress: Not on file   Social Connections: Unknown (1/1/2022)    Received from [facility], [facility]    Social Connections     Frequency of Communication with Friends and Family: Not on file   Interpersonal Safety: Not on file   Housing Stability: Not on file         FAMILY HISTORY: No family history on file.  Non-contributory for problems with anesthesia    REVIEW OF SYSTEMS:   The patient was asked a 14 point review of systems regarding constitutional symptoms, eye symptoms, ears, nose, mouth, throat symptoms, cardiovascular symptoms, respiratory symptoms, gastrointestinal symptoms, genitourinary symptoms, musculoskeletal symptoms, integumentary symptoms, neurological symptoms, psychiatric symptoms, endocrine symptoms, hematologic/lymphatic symptoms, and allergic/ immunologic symptoms.   The pertinent factors have been included in the HPI and below.  Patient Supplied Answers to Review of Systems      6/11/2024     7:11 AM   [facility] ENT ROS   Constitutional  Weight gain    Problems with sleep   Cardiopulmonary Cough       Physical Examination   The patient underwent a physical examination as described below. The pertinent positive and negative findings are summarized after the description of the examination.  Constitutional: The patient's developmental and nutritional status was assessed. The patient's voice quality was assessed.  Head and Face: The head and face were inspected for deformities. The sinuses were palpated. The salivary glands were palpated. Facial muscle strength was assessed bilaterally.  Eyes: Extraocular movements and primary gaze alignment were assessed.  Ears, Nose, Mouth and Throat: The ears and nose were examined for deformities. The nasal septum, mucosa, and turbinates were inspected by anterior rhinoscopy. The lips, teeth, and gums were examined for abnormalities. The oral mucosa, tongue, palate, tonsils, lateral and posterior pharynx were inspected for the presence of asymmetry or mucosal lesions.    Neck: The tracheal position was noted, and the neck mass palpated to determine if there were any asymmetries, abnormal neck masses, thyromegally, or thyroid nodules.  Respiratory: The nature of the breathing and chest expansion/symmetry was observed.  Cardiovascular: The patient was examined to determine the presence of any edema or jugular venous distension.  Abdomen: The contour of the abdomen was noted.  Lymphatic: The patient was examined for infraclavicular lymphadenopathy.  Musculoskeletal: The patient was inspected for the presence of skeletal deformities.  Extremities: The extremities were examined for any clubbing or cyanosis.  Skin: The skin was examined for inflammatory or neoplastic conditions.  Neurologic: The patient's orientation, mood, and affect were noted. The cranial nerve  functions were examined.  Other pertinent positive and negative findings on physical examination:      OC/OP: no lesions, uvula midline, soft palate elevates  "symmetrically   Neck: no lesions, R>L TH tenderness to palpation    All other physical examination findings were within normal limits and noncontributory.   Labs:  No results found for: \"TSH\"  No results found for: \"NA\", \"CO2\", \"BUN\", \"CREAT\", \"GLUCOSE\", \"PHOS\"  No results found for: \"WBC\", \"HGB\", \"HCT\", \"MCV\", \"PLT\"  No results found for: \"PT\", \"PTT\", \"INR\"  No results found for: \"MAREN\"  No components found for: \"RHEUMATOIDFACTOR\", \"RF\"  No results found for: \"CRP\"  No components found for: \"CKTOT\", \"URICACID\"  No components found for: \"C3\", \"C4\", \"DSDNAAB\", \"NDNAABIFA\"  No results found for: \"MPOAB\"    Patient reported Quality of Life (QOL) Measures   Patient Supplied Answers To VHI Questionnaire       No data to display                  Patient Supplied Answers To EAT Questionnaire       No data to display                  Patient Supplied Answers To CSI Questionnaire       No data to display                  @[email or web address]@    Impression & Plan     IMPRESSION: Mr. 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 A few subpleural nodules in the right middle lobe of the lung measuring up to 3 mm are unchanged as is a 3 mm left lower lobe nodule. These are probably benign.  Ascending thoracic aortic aneurysm measuring 46 mm unchanged. Patient subglottis  - no ACEI   - allergy triggers and work up: none  - pulmonary triggers and work up: working with pulm, had chest CT and bronchoscopy, has inhalers and tessalon perles  - GI triggers and work up: wakes up coughing at night, denies feeling reflux, vomits from coughing  - ENT triggers and work " up: associated with fragrances, heat, humidity, and moisture  - scope shows postcricoid edema  - symptoms likely due to irritable larynx syndrome and laryngopharyngeal reflux, does have coughing at night and snoring so need to rule out sleep apnea  - symptoms today 7/9/24 not better, though he has been working with [facility], can't live his life, constantly coughing and vomiting, he is very distraught, SLN block did not work, made things worse  - reviewed SLP note 6/13/24 and [facility]ing note 6/11/24, pulm note 11/13/23 - on inhaler, reviewed Xray Video Swallow Exam at swallow rounds 9/13/23 - normal  - discussed starting neuromodulation vs botox   - will start with neuromodulation, reports trying gabapentin before so will start with lyrica instead, will also put in the request for botox   Plan  - pregablin or lyrica  - Start reflux gourmet after meals and at night   - will get the process going for botox injection - 1st week in September   - continue reflux medications twice daily  - continue work with [facility]    RETURN VISIT: 6 weeks virtual    Thank you for the kind referral and for allowing me to share in the care of Mr. Colon. If you have any questions, please do not hesitate to contact me.    Kathy De Paz MD    Laryngology    [facility] Voice [facility]  Department of  Otolaryngology - Head and Neck Surgery  Clinics & Surgery Center  [address] [place] [address]  Appointment line: [phone]  Fax: [phone]  [email or web address]    50 minutes spent on the date of the encounter doing chart review, patient visit, and documentation

## 2024-07-09 ENCOUNTER — OFFICE VISIT (OUTPATIENT)
Dept: OTOLARYNGOLOGY | Facility: CLINIC | Age: 54
End: 2024-07-09
Payer: OTHER MISCELLANEOUS

## 2024-07-09 ENCOUNTER — PREP FOR PROCEDURE (OUTPATIENT)
Dept: OTOLARYNGOLOGY | Facility: CLINIC | Age: 54
End: 2024-07-09
Payer: COMMERCIAL

## 2024-07-09 VITALS — WEIGHT: 210 LBS | BODY MASS INDEX: 30.06 KG/M2 | HEIGHT: 70 IN

## 2024-07-09 DIAGNOSIS — R05.3 CHRONIC COUGH: Primary | ICD-10-CM

## 2024-07-09 DIAGNOSIS — J38.3 LARYNGEAL DYSTONIA: ICD-10-CM

## 2024-07-09 DIAGNOSIS — J38.5 LARYNGEAL SPASM: ICD-10-CM

## 2024-07-09 PROCEDURE — 99215 OFFICE O/P EST HI 40 MIN: CPT | Performed by: OTOLARYNGOLOGY

## 2024-07-09 RX ORDER — PREGABALIN 75 MG/1
CAPSULE ORAL
Qty: 132 CAPSULE | Refills: 0 | Status: SHIPPED | OUTPATIENT
Start: 2024-07-09 | End: 2024-08-24

## 2024-07-09 NOTE — PATIENT INSTRUCTIONS
1) How to take pregablin or lyrica    Each tab is 75mg    Escalation  Day 1-2 - take 1 tab in the morning  Day 3-4 - take 1 tab in the morning and 1  in the evening  Day 5-6 - take 1 tab morning, 1 midday, 1 in the evening  Maximal treatment  Day 7-84 - take 1 tab in the morning, 1 midday, 2 in the evening    Meet at week 6 virtually    2) Start reflux gourmet after meals and at night   Start reflux gourmet  This will form an algae float to prevent regurgitation   Trial right before bedtime for at least 1 week.   Also can trial after meals.  Do not take after having carbonated beverage     This can be bought on the website of the company or on [employer]    [email or web address]/            3) will get the process going for botox injection   4) continue reflux medications twice daily  5) continue work with [facility]

## 2024-07-09 NOTE — LETTER
2024       RE: Aris Colon  [address] [place] [id number]     Dear Colleague,    Thank you for referring your patient, Aris Colon, to the [facility] EAR NOSE AND THROAT CLINIC [place] [facility]. Please see a copy of my visit note below.        [facility]   at the [facility]   Otolaryngology Clinic     Patient: Aris Colon    MRN: [id number]    : 1970    Age/Gender: 54 year old male  Date of Service: 2024  Rendering Provider:   Kathy De Paz MD     Referring Provider   PCP: Mena Jaruegui  Referring Physician: Referred Self, MD  No address on file  Reason for Consultation   Cough  R SLN block 24  History   HISTORY OF PRESENT ILLNESS: Mr. Colon is a 54 year old male who presents to us today with cough.        he presents today for evaluation. he reports:  Still coughing  Did voice therapy     PAST MEDICAL HISTORY: No past medical history on file.    PAST SURGICAL HISTORY: No past surgical history on file.    CURRENT MEDICATIONS:   Current Outpatient Medications:      albuterol (PROAIR HFA/PROVENTIL HFA/VENTOLIN HFA) 108 (90 Base) MCG/ACT inhaler, 2 puffs every 4 hours as needed, Disp: , Rfl:      benzonatate (TESSALON) 100 MG capsule, Take 1 capsule (100 mg) by mouth 3 times daily as needed for cough, Disp: 30 capsule, Rfl: 4     FLUoxetine (PROZAC) 20 MG capsule, , Disp: , Rfl:      fluticasone-vilanterol (BREO ELLIPTA) 100-25 MCG/ACT inhaler, , Disp: , Rfl:      hydrOXYzine (ATARAX) 10 MG tablet, , Disp: , Rfl:      LANsoprazole (PREVACID) 30 MG DR capsule, Take 30 mg by mouth, Disp: , Rfl:      omeprazole (PRILOSEC) 20 MG DR capsule, TAKE 2 CAPSULES(40 MG) BY MOUTH TWICE DAILY, Disp: , Rfl:      pantoprazole (PROTONIX) 40 MG EC tablet, Take 1 tablet (40 mg) by mouth 2 times daily, Disp: 180 tablet, Rfl: 2     sildenafil (REVATIO) 20 MG  tablet, TAKE 1-5 TABLETS BY MOUTH 30 MINS TO 4 HOURS BEFORE SEXUAL ACTIVITY, Disp: , Rfl:      tiotropium (SPIRIVA RESPIMAT) 2.5 MCG/ACT inhaler, Inhale 2 puffs into the lungs, Disp: , Rfl:     ALLERGIES: Patient has no known allergies.    SOCIAL HISTORY:    Social History     Socioeconomic History     Marital status:      Spouse name: Not on file     Number of children: Not on file     Years of education: Not on file     Highest education level: Not on file   Occupational History     Not on file   Tobacco Use     Smoking status: Never     Passive exposure: Never     Smokeless tobacco: Never   Vaping Use     Vaping status: Never Used   Substance and Sexual Activity     Alcohol use: Not on file     Drug use: Not on file     Sexual activity: Not on file   Other Topics Concern     Not on file   Social History Narrative     Not on file     Social Determinants of Health     Financial Resource Strain: High Risk (1/1/2022)    [facility], [employer] [facility]    Financial Resource Strain      Difficulty of Paying Living Expenses: Not on file      Difficulty of Paying Living Expenses: Not on file   Food Insecurity: Not on file   Transportation Needs: Not on file   Physical Activity: Not on file   Stress: Not on file   Social Connections: Unknown (1/1/2022)    Received from [facility], [employer] [facility]    Social Connections      Frequency of Communication with Friends and Family: Not on file   Interpersonal Safety: Not on file   Housing Stability: Not on file         FAMILY HISTORY: No family history on file.  Non-contributory for problems with anesthesia    REVIEW OF SYSTEMS:   The patient was asked a 14 point review of systems regarding constitutional symptoms, eye symptoms, ears, nose, mouth, throat symptoms, cardiovascular symptoms, respiratory symptoms, gastrointestinal symptoms,  genitourinary symptoms, musculoskeletal symptoms, integumentary symptoms, neurological symptoms, psychiatric symptoms, endocrine symptoms, hematologic/lymphatic symptoms, and allergic/ immunologic symptoms.   The pertinent factors have been included in the HPI and below.  Patient Supplied Answers to Review of Systems      6/11/2024     7:11 AM   [facility] ENT ROS   Constitutional Weight gain    Problems with sleep   Cardiopulmonary Cough       Physical Examination   The patient underwent a physical examination as described below. The pertinent positive and negative findings are summarized after the description of the examination.  Constitutional: The patient's developmental and nutritional status was assessed. The patient's voice quality was assessed.  Head and Face: The head and face were inspected for deformities. The sinuses were palpated. The salivary glands were palpated. Facial muscle strength was assessed bilaterally.  Eyes: Extraocular movements and primary gaze alignment were assessed.  Ears, Nose, Mouth and Throat: The ears and nose were examined for deformities. The nasal septum, mucosa, and turbinates were inspected by anterior rhinoscopy. The lips, teeth, and gums were examined for abnormalities. The oral mucosa, tongue, palate, tonsils, lateral and posterior pharynx were inspected for the presence of asymmetry or mucosal lesions.    Neck: The tracheal position was noted, and the neck mass palpated to determine if there were any asymmetries, abnormal neck masses, thyromegally, or thyroid nodules.  Respiratory: The nature of the breathing and chest expansion/symmetry was observed.  Cardiovascular: The patient was examined to determine the presence of any edema or jugular venous distension.  Abdomen: The contour of the abdomen was noted.  Lymphatic: The patient was examined for infraclavicular lymphadenopathy.  Musculoskeletal: The patient was inspected for the presence of skeletal deformities.  Extremities: The  "extremities were examined for any clubbing or cyanosis.  Skin: The skin was examined for inflammatory or neoplastic conditions.  Neurologic: The patient's orientation, mood, and affect were noted. The cranial nerve  functions were examined.  Other pertinent positive and negative findings on physical examination:      OC/OP: no lesions, uvula midline, soft palate elevates symmetrically   Neck: no lesions, R>L TH tenderness to palpation    All other physical examination findings were within normal limits and noncontributory.   Labs:  No results found for: \"TSH\"  No results found for: \"NA\", \"CO2\", \"BUN\", \"CREAT\", \"GLUCOSE\", \"PHOS\"  No results found for: \"WBC\", \"HGB\", \"HCT\", \"MCV\", \"PLT\"  No results found for: \"PT\", \"PTT\", \"INR\"  No results found for: \"MAREN\"  No components found for: \"RHEUMATOIDFACTOR\", \"RF\"  No results found for: \"CRP\"  No components found for: \"CKTOT\", \"URICACID\"  No components found for: \"C3\", \"C4\", \"DSDNAAB\", \"NDNAABIFA\"  No results found for: \"MPOAB\"    Patient reported Quality of Life (QOL) Measures   Patient Supplied Answers To VHI Questionnaire       No data to display                  Patient Supplied Answers To EAT Questionnaire       No data to display                  Patient Supplied Answers To CSI Questionnaire       No data to display                  @[email or web address]    Impression & Plan     IMPRESSION: Mr. 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 A few subpleural nodules in the right middle lobe of the lung measuring up to 3 mm are unchanged as is a 3 mm left lower lobe nodule. These are probably benign.  " Ascending thoracic aortic aneurysm measuring 46 mm unchanged. Patient subglottis  - no ACEI   - allergy triggers and work up: none  - pulmonary triggers and work up: working with pulm, had chest CT and bronchoscopy, has inhalers and tessalon perles  - GI triggers and work up: wakes up coughing at night, denies feeling reflux, vomits from coughing  - ENT triggers and work up: associated with fragrances, heat, humidity, and moisture  - scope shows postcricoid edema  - symptoms likely due to irritable larynx syndrome and laryngopharyngeal reflux, does have coughing at night and snoring so need to rule out sleep apnea  - symptoms today 7/9/24 not better, though he has been working with [facility], can't live his life, constantly coughing and vomiting, he is very distraught, SLN block did not work, made things worse  - reviewed SLP note 6/13/24 and [facility] note 6/11/24, pulm note 11/13/23 - on inhaler, reviewed Xray Video Swallow Exam at swallow rounds 9/13/23 - normal  - discussed starting neuromodulation vs botox   - will start with neuromodulation, reports trying gabapentin before so will start with lyrica instead, will also put in the request for botox   Plan  - pregablin or lyrica  - Start reflux gourmet after meals and at night   - will get the process going for botox injection - 1st week in September   - continue reflux medications twice daily  - continue work with [facility]ra    RETURN VISIT: 6 weeks virtual    Thank you for the kind referral and for allowing me to share in the care of Mr. Colon. If you have any questions, please do not hesitate to contact me.    Kathy De Paz MD    Laryngology    [facility] Voice [facility]  Department of  Otolaryngology - Head and Neck Surgery  Clinics & Surgery Center  [address] [place] [id number]  Appointment line: [phone]  Fax: [phone]  [email or web address]-[email or web address]    50 minutes spent on the date of the encounter  doing chart review, patient visit, and documentation       Again, thank you for allowing me to participate in the care of your patient.      Sincerely,    Kathy De Paz MD

## 2024-07-12 ENCOUNTER — OFFICE VISIT (OUTPATIENT)
Dept: OTOLARYNGOLOGY | Facility: CLINIC | Age: 54
End: 2024-07-12
Payer: OTHER MISCELLANEOUS

## 2024-07-12 DIAGNOSIS — J38.7 IRRITABLE LARYNX SYNDROME: ICD-10-CM

## 2024-07-12 DIAGNOSIS — R05.3 CHRONIC COUGH: Primary | ICD-10-CM

## 2024-07-12 PROCEDURE — 92507 TX SP LANG VOICE COMM INDIV: CPT | Mod: GN | Performed by: SPEECH-LANGUAGE PATHOLOGIST

## 2024-07-12 NOTE — PROGRESS NOTES
[facility] VOICE CLINIC  PROGRESS REPORT (CPT 69513)    Patient: Aris Colon  Date of Service: 7/12/2024  Date of Last Service: 6/13/24  Number of Visits: 12/18 approved  Referring Physician: Dr. Kathy De Paz    Impressions from evaluation on 11/17/23 by Mark Dutta M.S., CCC-SLP:  Derrick Colon presents today with ongoing chronic cough which began in conjunction with workplace particle exposure, and with recent exacerbation including hemoptysis and emesis.  Laryngeal evaluation today was warranted given recent episodes of hemoptysis to ensure the patient was not recovering from hemorrhagic or ulcerative event.  This demonstrated no acute trauma which is fortunate, though diffuse change to the vocal folds and surrounding tissues from longstanding cough was seen consistent with previous evaluation if slightly worsened.  Cough response was aggressive to the application of oxymetazoline nasal spray, and guarding and light cough was noted to occur even prior to the administration, which underscores both the hypersensitivity as well as the stress base response to triggers that has developed over time.  Within the exam itself patient was able to attenuate cough through focused relaxation which bodes well for his ability to incorporate therapeutic strategies to reduce cough, though as we discussed full resolution of symptoms is often challenging due to their multifactorial nature especially in the case of irritant exposure.      SUBJECTIVE:  Since Derrick's last session, he reports the following:   He feels like the area around his larynx has been closed up and constricted.   He's vomited 10 times since his last session, almost an 11th time while waiting in the waiting room today next to someone wearing perfume. Triggers have included food smells, talking, wet dog, rain, humidity, a/c in car, hot air, chilly air, smoke from fireworks, etc.   He expressed significant frustration with the severity of impact on  his QOL which the chronic coughing continues to have, stating he can barely leave the house without fear of triggers and possibly vomiting in public.     OBJECTIVE:  Patient Specific Goal Metrics:      1/25/2024    11:00 AM 3/21/2024    12:00 PM 4/4/2024    12:00 PM   Dysponia SLP Goals   How severe is your cough /throat clearing, if 0 is no cough at all and 10 is the worst cough? 10 7 8   How much does your cough/throat-clearing problem bother you?            Very Much Quite a bit Very Much       THERAPEUTIC ACTIVITIES  Today Derrick participated in the following therapeutic activities:  Counseling and Education:  Asked questions about the nature of his symptoms, and I answered all of these thoroughly.    I provided Derrick with explanation and skilled instruction of:  Sniff & ballooned cheeks seems most facilitative in staving off coughing fits and gagging episodes.   With ongoing verbal and visual coaching, along with a visual written handout for reinforcement, Derrick was able to gradually calm his coughing fits after being triggered by scents in the waiting room.   He did express that it is difficult to stop coughing to do the strategy once he starts, but was able to subsequently use the strategy with ongoing verbal cues.   Phonation was then added to the pursed lip/ballooned cheek exhalation, using SOVT technique, to good effect.   He was able to target gentle sighs, sustained pitch, and glides up and down while maintaining cheek inflation, demonstrating a smoother and less strained voice quality than is his habitual speaking voice production.   These tasks did not appear to trigger additional coughing.   We discussed the goal of general throat/laryngeal relaxation with the sniff and pursed lip exhalation, along with the additional goal of retraining his phonatory muscle patterns while combining phonation with pursed lip exhalation.     Instruction of Home Practice:  A revised regimen for home practice was  instructed.  I provided an AVS of important notes of today's therapeutic activities to facilitate practice.    ASSESSMENT/PLAN  Progress toward long-term goals:  Adequate but incomplete progress; please see above    Impressions:  IMPRESSIONS: chronic cough   Derrick had a productive session of therapy today, working on alternative SOVT tasks in combination with strategies to mitigate coughing fits.  He will continue to work on his exercises on a daily basis, and work on incorporating the techniques into his daily activities. Speech therapy continues to be medically necessary for Derrick to participate fully and engage in activities of daily living.     Plan:   I will see Derrick in 4 weeks and will plan to use pursed lip balloon SOVTs to target pitch SOVT (123Hz) progressing to M word level. Add some jaw release/massage. Attempt manual laryngeal release, continue small straw sip and blow to stop cough.     For home practice goals, see AVS.     TOTAL SERVICE TIME: 60 minutes  TREATMENT ([id number])    Milagros Landa (voice), M.S., CCC-SLP  Speech-Language Pathologist  [facility]  [phone]  [email or web address]  Pronouns: she/her/hers      *this report was created in part through the use of computerized dictation software, and though reviewed following completion, some typographic errors may persist.  If there is confusion regarding any of this notes contents, please contact me for clarification    OBJECTIVE FINDINGS  PATIENT REPORTED MEASURES  Patient Supplied Answers To Last 2 VHI Questionnaires       No data to display                   Patient Supplied Answers To Last 2 CSI Questionnaires       No data to display                   Patient Supplied Answers To Last 2 EAT Questionnaires       No data to display                  Patient Supplied Answers to Dyspnea Index Questionnaire:       No data to display

## 2024-07-12 NOTE — LETTER
7/12/2024       RE: Aris Colon  [address] [place] [id number]     Dear Colleague,    Thank you for referring your patient, Aris Colon, to the [facility]ICE [facility]IN[facility] [place] at [facility]. Please see a copy of my visit note below.    [facility]  PROGRESS REPORT (CPT [id number])    Patient: Aris Colon  Date of Service: 7/12/2024  Date of Last Service: 6/13/24  Number of Visits: 12/18 approved  Referring Physician: Dr. Kathy De Paz    Impressions from evaluation on 11/17/23 by Mark Dutta M.S., CCC-SLP:  Derrick Colon presents today with ongoing chronic cough which began in conjunction with workplace particle exposure, and with recent exacerbation including hemoptysis and emesis.  Laryngeal evaluation today was warranted given recent episodes of hemoptysis to ensure the patient was not recovering from hemorrhagic or ulcerative event.  This demonstrated no acute trauma which is fortunate, though diffuse change to the vocal folds and surrounding tissues from longstanding cough was seen consistent with previous evaluation if slightly worsened.  Cough response was aggressive to the application of oxymetazoline nasal spray, and guarding and light cough was noted to occur even prior to the administration, which underscores both the hypersensitivity as well as the stress base response to triggers that has developed over time.  Within the exam itself patient was able to attenuate cough through focused relaxation which bodes well for his ability to incorporate therapeutic strategies to reduce cough, though as we discussed full resolution of symptoms is often challenging due to their multifactorial nature especially in the case of irritant exposure.      SUBJECTIVE:  Since Derrick's last session, he reports the following:   He feels like the area around his larynx has been closed up and constricted.    He's vomited 10 times since his last session, almost an 11th time while waiting in the waiting room today next to someone wearing perfume. Triggers have included food smells, talking, wet dog, rain, humidity, a/c in car, hot air, chilly air, smoke from fireworks, etc.   He expressed significant frustration with the severity of impact on his QOL which the chronic coughing continues to have, stating he can barely leave the house without fear of triggers and possibly vomiting in public.     OBJECTIVE:  Patient Specific Goal Metrics:      1/25/2024    11:00 AM 3/21/2024    12:00 PM 4/4/2024    12:00 PM   Dysponia SLP Goals   How severe is your cough /throat clearing, if 0 is no cough at all and 10 is the worst cough? 10 7 8   How much does your cough/throat-clearing problem bother you?            Very Much Quite a bit Very Much       THERAPEUTIC ACTIVITIES  Today Derrick participated in the following therapeutic activities:  Counseling and Education:  Asked questions about the nature of his symptoms, and I answered all of these thoroughly.    I provided Derrick with explanation and skilled instruction of:  Sniff & ballooned cheeks seems most facilitative in staving off coughing fits and gagging episodes.   With ongoing verbal and visual coaching, along with a visual written handout for reinforcement, Derrick was able to gradually calm his coughing fits after being triggered by scents in the waiting room.   He did express that it is difficult to stop coughing to do the strategy once he starts, but was able to subsequently use the strategy with ongoing verbal cues.   Phonation was then added to the pursed lip/ballooned cheek exhalation, using SOVT technique, to good effect.   He was able to target gentle sighs, sustained pitch, and glides up and down while maintaining cheek inflation, demonstrating a smoother and less strained voice quality than is his habitual speaking voice production.   These tasks did not appear to  trigger additional coughing.   We discussed the goal of general throat/laryngeal relaxation with the sniff and pursed lip exhalation, along with the additional goal of retraining his phonatory muscle patterns while combining phonation with pursed lip exhalation.     Instruction of Home Practice:  A revised regimen for home practice was instructed.  I provided an AVS of important notes of today's therapeutic activities to facilitate practice.    ASSESSMENT/PLAN  Progress toward long-term goals:  Adequate but incomplete progress; please see above    Impressions:  IMPRESSIONS: chronic cough   Derrick had a productive session of therapy today, working on alternative SOVT tasks in combination with strategies to mitigate coughing fits.  He will continue to work on his exercises on a daily basis, and work on incorporating the techniques into his daily activities. Speech therapy continues to be medically necessary for Derrick to participate fully and engage in activities of daily living.     Plan:   I will see Derrick in 4 weeks and will plan to use pursed lip balloon SOVTs to target pitch SOVT (123Hz) progressing to M word level. Add some jaw release/massage. Attempt manual laryngeal release, continue small straw sip and blow to stop cough.     For home practice goals, see AVS.     TOTAL SERVICE TIME: 60 minutes  TREATMENT ([id number])    Milagros Landa (voice), M.S., CCC-SLP  Speech-Language Pathologist  [facility]  [phone]  [email or web address]  Pronouns: she/her/hers      *this report was created in part through the use of computerized dictation software, and though reviewed following completion, some typographic errors may persist.  If there is confusion regarding any of this notes contents, please contact me for clarification    OBJECTIVE FINDINGS  PATIENT REPORTED MEASURES  Patient Supplied Answers To Last 2 VHI Questionnaires       No data to display                   Patient Supplied Answers To Last 2  CSI Questionnaires       No data to display                   Patient Supplied Answers To Last 2 EAT Questionnaires       No data to display                  Patient Supplied Answers to Dyspnea Index Questionnaire:       No data to display                 Again, thank you for allowing me to participate in the care of your patient.      Sincerely,    Ora Shepard, SLP

## 2024-07-12 NOTE — PATIENT INSTRUCTIONS
"Hello!     Please complete the exercises below and remember, a few minutes of practice many times throughout the day is more important than one large practice session. If you have any questions about your strategies, feel free to contact me at [email or web address] or via [employer]. Please call [phone] for scheduling alterations or to be seen sooner in case of cancellations.      Remember that your new goal is to KEEP THE AIR IN!!! Getting everything out leads to compression of your body, which is what makes you vomit and keep coughing.     Home Exercise Program:  1. Shoulders back/chest lifted!  2. Sip in & blow out with small straw  3. Repeat as needed, even if you're only getting little snippets of the exercise between coughs.      VOICE EXERCISES (1x/hour)  -- Inhale SILENTLY and feel your belly fill with air  -- Slowly exhale as your belly contracts, ROUND \"OO\" LIPS  -- Skinny Straw and 1 inch of water in a cup  -- \"Whoooooo\" (smooth bubbles like a motor boat or a simmering pot of water)  3x silently with just air for 5-7 seconds  5x steady pitch on 123 Hz or B2 (use the deanna)  3x sliding up and down on \"whoooo\" with bubbles  5 sentences, bubbling on \"whooo\" with the pitch pattern you would use when speaking.   -- Double-check that your LIPS are ROUNDED, chest lifted     BUZZY VOICE  (Periodically throughout the day)  Hold the large straw to rounded lips  Hum on a buzzy \"hmmmm\" on 123Hz or B2   Then chant/sing on 123Hz or B2:   Mmmememe  Mmmamama  Mmmomomo  Mmmoomoomoo  Mmmaymaymay  Mmmymymy  Words: mom, moon, man,   The goal is to feel a little bit of buzzing/tickle/vibration on your lips/nose/tongue/straw- somewhere above your chin.      Download the deanna Pitch Perfect (Gray background/White Music Symbols)  Open the deanna  Tap the second from the bottom left option \"Notes\"  Scroll until you find 123Hz or B2  Tap that line to hear your Target Pitch  Feel the air flow " "    ____________________________________________________     OLDER NOTES BELOW:      SILENT BREATHING STRATEGIES   These help to relax the throat and keep things calm. Noise, particularly higher pitch noise, as you inhale means you've likely tightened somewhere. The more noisy and tight your inhalation is, the more likely you'll start coughing.      Pick 1 inhalation and 1 exhalation, then repeat back and forth until your throat feels calmer.   INHALATION: EXHALATION:   1. Tongue tuck  2. Sniff  3. Puppy Yawn  4. Straw  5. Rounded lip  6. Lip Whistle or Kazoo 1. Tongue tuck  2. Ballooned cheeks  3. Fist balloon  4. Shhhhhhh  5. Sh sh sh sh sh...  6. Lip Whistle or Kazoo      Adding Voice:  For 2-3 minutes, here and there throughout the day, use your lip whistle or kazoo:   1. Inhale silently through the lip whistle or kazoo  2. Gently sigh out, letting air flow through the lip whistle or kazoo, adding your voice on \"Whoooooo\"  3. Play with pitch glides, gliding down, gliding up, gliding up and down like a siren  4. Double check that you hear the buzz/spin of the lip whistle/kazoo      ____________________________________________________     Sternocleidomastoid  1. Your SCM, or sternocleidomastoid, is the muscle that attaches to the bony bump behind each each and courses downward and slightly forward along your neck, attaching at the other end to your clavicle and sternum. If you turn your head to the side, you can feel the SCM muscle sticking out.   2. With your head facing forward, use the flat part of your thumb and pointer to gently pinch, knead, and massage the SCM on ONE SIDE at a time.   3. You can gently pinch and hold the SCM, or rub your thumb and pointer back and forth to knead the SCM, or gently jiggle the SCM forward and backward. Eventually add deep inhalations or sighs so that your SCM learns to also relax in the context of speaking/singing.   4. Now switch to the other side and repeat.   5. Remember " "that you never want to use more than 3 or 4/10 intensity. It's better to be gentle and repeat this strategy several times a day than to be aggressive and cause the SCM to tense up more in response to discomfort. If you get dizzy or feel a strong pulse, please move your finger or wait until we can review this.      Lateral Laryngeal Stretch:  1. Position your hands like praying, then rotate both sets of fingers toward yourself and place them along each side of your marco's apple. OR, place your thumb and two fingers on each side of your marco's apple.   2. Gentle shift your marco's apple side-to-side, only about a centimeter in each direction.   3. Decide which direction feels easier.   4. Shift your marco's apple about a centimeter in the easier direction and hold, breathing slow, deep breaths for about 120 seconds.   5. Shift the other direction and hold, breathing slow, deep breaths for about 120 seconds.      Downward Laryngeal Stretch:  1. Gently find the groove at the top of your marco's apple, then follow the groove to each side and place a thumb and pointer finger on each side of your marco's apple, within the slight indented groove along the top of each side.   2. Inhale slowly with a yawny sensation, gently pulling downward along your marco's apple (Don't pinch hard, just enough to \"suggest\" a downward movement).   3. Reset your fingers at the top of your larynx and repeat several times.   4. Always be gentle- don't use enough pressure that you would bruise a peach or banana if you were holding one between your fingers.   5. After repeating 3-5 times, add a gentle sigh as you exhale \"Huuuhhhhh\"   ____________________________________________________     BELLY BREATHING  Stretch: (each morning)  Stretch your arms up and take two slow, deep breaths, feeling your rib cage lift and stretch.    Then lean toward each side, taking 2 slow, deep breaths on each side, feeling your ribcage lift and expand along the sides. " "  Now leaning back, arching your back to feel a stretch along the front of your ribs, and take 2 more slow, deep breaths.    Lastly, lean forward and allow your back and neck to stretch and take 2 slow, deep breaths.      Practice: (5 breaths per hour or 10 breaths at each meal)  Sit hinged forward with your elbows on your knees. OR Sit normally and place your hands on either side of your belly and back or low ribs  If someone is nearby to help, they can place their hands on the sides of your back and ribs.   Slowly breathe in and feel your belly and back expand, like filling a balloon, pushing out against your waistband  Slowly breathe out on [facility] and feel your belly and back crunch inward, like zipping tight pants  Repeat slowly and take breaks if you get dizzy     HELPFUL HINTS:  -- Tie a string or piece of elastic around your low ribs when you get dressed. You'll be able to feel yourself expand against this as you breathe all day.   -- Some people find it easiest to practice while laying on their back or in a recliner, hands on their belly.  -- You can also lay on your stomach with your arms over your head to feel your back expand. It's not unusual for people to struggle noticing their back expanding to start with.      WHEN YOU COUGH:   Keep your sternum lifted  Make the abs crunch up and in, like someone punching you in the gut.   ____________________________________________________      COUGH STOPPER  1. Press your fist against your lips  2. Aim to balloon your cheeks and throat as you cough  3. ONLY breathe back in through your nose- inhaling through your mouth triggers more coughing  4. Keep repeating the balloon and sniff, balloon and sniff, until the spasm stops.     If you are willing, teach this to friends and family so that when you start coughing, someone next to you can say \"Make a fist! Balloon! Balloon!\"  OR put up post-it note reminders for yourself        Thank you and have a great " day!  Milagros Marmolejo (voice), M.S., CCC-SLP  Speech-Language Pathologist  [facility]  [phone]  [email or web address]  Pronouns: she/her/hers

## 2024-07-16 ENCOUNTER — TELEPHONE (OUTPATIENT)
Dept: OTOLARYNGOLOGY | Facility: CLINIC | Age: 54
End: 2024-07-16
Payer: COMMERCIAL

## 2024-07-16 NOTE — TELEPHONE ENCOUNTER
Left patient a voicemail to schedule surgery for LARYNGOSCOPY, FLEXIBLE WITH INJECTION with botox (N/A) with Dr. De Paz - Left Surgery Scheduling line for callback [phone]    Franchesca Torres on 7/16/2024 at 10:36 AM

## 2024-07-18 NOTE — TELEPHONE ENCOUNTER
Left patient another voicemail to schedule surgery for LARYNGOSCOPY, FLEXIBLE WITH INJECTION with botox (N/A) with Dr. De Paz - Left Surgery Scheduling line for callback [phone]    Mike Rush  7/18/2024 at 2:55 PM

## 2024-07-21 ENCOUNTER — HEALTH MAINTENANCE LETTER (OUTPATIENT)
Age: 54
End: 2024-07-21

## 2024-08-01 ENCOUNTER — OFFICE VISIT (OUTPATIENT)
Dept: OTOLARYNGOLOGY | Facility: CLINIC | Age: 54
End: 2024-08-01
Payer: OTHER MISCELLANEOUS

## 2024-08-01 DIAGNOSIS — R05.3 CHRONIC COUGH: Primary | ICD-10-CM

## 2024-08-01 PROCEDURE — 92507 TX SP LANG VOICE COMM INDIV: CPT | Mod: GN | Performed by: SPEECH-LANGUAGE PATHOLOGIST

## 2024-08-01 NOTE — PATIENT INSTRUCTIONS
"Hello!     Please complete the exercises below and remember, a few minutes of practice many times throughout the day is more important than one large practice session. If you have any questions about your strategies, feel free to contact me at [email or web address] or via [email or web address]. Please call [phone] for scheduling alterations or to be seen sooner in case of cancellations.      Remember that your new goal is to KEEP THE AIR IN!!! Getting everything out leads to compression of your body, which is what makes you vomit and keep coughing.     Home Exercise Program:  1. Shoulders back/chest lifted!  2. Sip in & blow out with small straw  3. Repeat as needed, even if you're only getting little snippets of the exercise between coughs.      VOICE EXERCISES (1x/hour)  -- Inhale SILENTLY and feel your belly fill with air  -- Slowly exhale as your belly contracts, ROUND \"OO\" LIPS  -- Skinny Straw and 1 inch of water in a cup  -- \"Whoooooo\" (smooth bubbles like a motor boat or a simmering pot of water)  3x silently with just air for 5-7 seconds  5x steady pitch on 123 Hz or B2 (use the deanna)  3x sliding up and down on \"whoooo\" with bubbles  5 sentences, bubbling on \"whooo\" with the pitch pattern you would use when speaking.   -- Double-check that your LIPS are ROUNDED, chest lifted     BUZZY VOICE  (Periodically throughout the day)  Hold the large straw to rounded lips  Hum on a buzzy \"hmmmm\" on 123Hz or B2   Then chant/sing on 123Hz or B2:   Mmmememe  Mmmamama  Mmmomomo  Mmmoomoomoo  Mmmaymaymay  Mmmymymy  Words: mom, moon, man,   The goal is to feel a little bit of buzzing/tickle/vibration on your lips/nose/tongue/straw- somewhere above your chin.      Download the deanna Pitch Perfect (Gray background/White Music Symbols)  Open the deanna  Tap the second from the bottom left option \"Notes\"  Scroll until you find 123Hz or B2  Tap that line to hear your Target Pitch  Feel the air flow    " "  ____________________________________________________     OLDER NOTES BELOW:      SILENT BREATHING STRATEGIES   These help to relax the throat and keep things calm. Noise, particularly higher pitch noise, as you inhale means you've likely tightened somewhere. The more noisy and tight your inhalation is, the more likely you'll start coughing.      Pick 1 inhalation and 1 exhalation, then repeat back and forth until your throat feels calmer.   INHALATION: EXHALATION:   1. Tongue tuck  2. Sniff  3. Puppy Yawn  4. Straw  5. Rounded lip  6. Lip Whistle or Kazoo 1. Tongue tuck  2. Ballooned cheeks  3. Fist balloon  4. Shhhhhhh  5. Sh sh sh sh sh...  6. Lip Whistle or Kazoo      Adding Voice:  For 2-3 minutes, here and there throughout the day, use your lip whistle or kazoo:   1. Inhale silently through the lip whistle or kazoo  2. Gently sigh out, letting air flow through the lip whistle or kazoo, adding your voice on \"Whoooooo\"  3. Play with pitch glides, gliding down, gliding up, gliding up and down like a siren  4. Double check that you hear the buzz/spin of the lip whistle/kazoo      ____________________________________________________     Sternocleidomastoid  1. Your SCM, or sternocleidomastoid, is the muscle that attaches to the bony bump behind each each and courses downward and slightly forward along your neck, attaching at the other end to your clavicle and sternum. If you turn your head to the side, you can feel the SCM muscle sticking out.   2. With your head facing forward, use the flat part of your thumb and pointer to gently pinch, knead, and massage the SCM on ONE SIDE at a time.   3. You can gently pinch and hold the SCM, or rub your thumb and pointer back and forth to knead the SCM, or gently jiggle the SCM forward and backward. Eventually add deep inhalations or sighs so that your SCM learns to also relax in the context of speaking/singing.   4. Now switch to the other side and repeat.   5. Remember " "that you never want to use more than 3 or 4/10 intensity. It's better to be gentle and repeat this strategy several times a day than to be aggressive and cause the SCM to tense up more in response to discomfort. If you get dizzy or feel a strong pulse, please move your finger or wait until we can review this.      Lateral Laryngeal Stretch:  1. Position your hands like praying, then rotate both sets of fingers toward yourself and place them along each side of your marco's apple. OR, place your thumb and two fingers on each side of your marco's apple.   2. Gentle shift your marco's apple side-to-side, only about a centimeter in each direction.   3. Decide which direction feels easier.   4. Shift your marco's apple about a centimeter in the easier direction and hold, breathing slow, deep breaths for about 120 seconds.   5. Shift the other direction and hold, breathing slow, deep breaths for about 120 seconds.      Downward Laryngeal Stretch:  1. Gently find the groove at the top of your marco's apple, then follow the groove to each side and place a thumb and pointer finger on each side of your marco's apple, within the slight indented groove along the top of each side.   2. Inhale slowly with a yawny sensation, gently pulling downward along your marco's apple (Don't pinch hard, just enough to \"suggest\" a downward movement).   3. Reset your fingers at the top of your larynx and repeat several times.   4. Always be gentle- don't use enough pressure that you would bruise a peach or banana if you were holding one between your fingers.   5. After repeating 3-5 times, add a gentle sigh as you exhale \"Huuuhhhhh\"   ____________________________________________________     BELLY BREATHING  Stretch: (each morning)  Stretch your arms up and take two slow, deep breaths, feeling your rib cage lift and stretch.    Then lean toward each side, taking 2 slow, deep breaths on each side, feeling your ribcage lift and expand along the sides. " "  Now leaning back, arching your back to feel a stretch along the front of your ribs, and take 2 more slow, deep breaths.    Lastly, lean forward and allow your back and neck to stretch and take 2 slow, deep breaths.      Practice: (5 breaths per hour or 10 breaths at each meal)  Sit hinged forward with your elbows on your knees. OR Sit normally and place your hands on either side of your belly and back or low ribs  If someone is nearby to help, they can place their hands on the sides of your back and ribs.   Slowly breathe in and feel your belly and back expand, like filling a balloon, pushing out against your waistband  Slowly breathe out on [facility] and feel your belly and back crunch inward, like zipping tight pants  Repeat slowly and take breaks if you get dizzy     HELPFUL HINTS:  -- Tie a string or piece of elastic around your low ribs when you get dressed. You'll be able to feel yourself expand against this as you breathe all day.   -- Some people find it easiest to practice while laying on their back or in a recliner, hands on their belly.  -- You can also lay on your stomach with your arms over your head to feel your back expand. It's not unusual for people to struggle noticing their back expanding to start with.      WHEN YOU COUGH:   Keep your sternum lifted  Make the abs crunch up and in, like someone punching you in the gut.   ____________________________________________________      COUGH STOPPER  1. Press your fist against your lips  2. Aim to balloon your cheeks and throat as you cough  3. ONLY breathe back in through your nose- inhaling through your mouth triggers more coughing  4. Keep repeating the balloon and sniff, balloon and sniff, until the spasm stops.     If you are willing, teach this to friends and family so that when you start coughing, someone next to you can say \"Make a fist! Balloon! Balloon!\"  OR put up post-it note reminders for yourself        Thank you and have a great " day!  Milagros Marmolejo (voice), M.S., CCC-SLP  Speech-Language Pathologist  [facility]  [phone]  [email or web address]  Pronouns: she/her/hers

## 2024-08-01 NOTE — PROGRESS NOTES
[facility] VOICE CLINIC  PROGRESS REPORT (CPT 03688)    Patient: Aris Colon  Date of Service: 8/1/2024  Date of Last Service: 7/12/24  Number of Visits: 13/18 approved  Referring Physician: Dr. Kathy De Paz    Impressions from evaluation on 11/17/23 by Mark Dutta M.S., CCC-SLP:  Derrick Colon presents today with ongoing chronic cough which began in conjunction with workplace particle exposure, and with recent exacerbation including hemoptysis and emesis.  Laryngeal evaluation today was warranted given recent episodes of hemoptysis to ensure the patient was not recovering from hemorrhagic or ulcerative event.  This demonstrated no acute trauma which is fortunate, though diffuse change to the vocal folds and surrounding tissues from longstanding cough was seen consistent with previous evaluation if slightly worsened.  Cough response was aggressive to the application of oxymetazoline nasal spray, and guarding and light cough was noted to occur even prior to the administration, which underscores both the hypersensitivity as well as the stress base response to triggers that has developed over time.  Within the exam itself patient was able to attenuate cough through focused relaxation which bodes well for his ability to incorporate therapeutic strategies to reduce cough, though as we discussed full resolution of symptoms is often challenging due to their multifactorial nature especially in the case of irritant exposure.        SUBJECTIVE:  Since Derrick's last session, he reports the following:   His throat is hurting more than usual today.   He's been coughing a lot more recently.   He vomited about 8x in the past two weeks due to coughing.   He may be getting botox injections from Dr. De Paz, but it's currently in review by insurance.    OBJECTIVE:  Patient Specific Goal Metrics:      1/25/2024    11:00 AM 3/21/2024    12:00 PM 4/4/2024    12:00 PM   Dysponia SLP Goals   How severe is your cough /throat  "clearing, if 0 is no cough at all and 10 is the worst cough? 10 7 8   How much does your cough/throat-clearing problem bother you?            Very Much Quite a bit Very Much       THERAPEUTIC ACTIVITIES  Today Derrick participated in the following therapeutic activities:  Counseling and Education:  Asked questions about the nature of his symptoms, and I answered all of these thoroughly.    I provided Derrick with explanation and skilled instruction of:  Exercises to coordinate phonation with optimal flowing airstream and reduce phonatory impact.   Semi-occluded vocal tract exercises transitioning into M initial speech tasks were most facilitating  He progressed through the hum, syllable, and word level of phonatory complexity  He demonstrated fair ability to target a more \"forward, nasal\" resonance vs his habitual throaty resonance.   We discussed the tendency for throaty/posterior resonance to trigger more raspiness and throat irritation  Patient demonstrated good learning, but will need practice and additional reinforcement  All tasks were targeted at 130Hz.    Instruction of Home Practice:  A revised regimen for home practice was instructed.  I provided an AVS of important notes of today's therapeutic activities to facilitate practice.    ASSESSMENT/PLAN  Progress toward long-term goals:  Adequate but incomplete progress; please see above    Impressions:  IMPRESSIONS: chronic cough   Derrick had a productive session of therapy today, working on progression of resonant voice tasks.  He will continue to work on his exercises on a daily basis, and work on incorporating the techniques into his daily activities. Speech therapy continues to be medically necessary for Derrick to participate fully and engage in activities of daily living.     Plan:   I will see Derrick in 2 weeks and will plan to continue targeting resonant voice at 130Hz, progressing from M initial syllables and words as able.     For home practice goals, see " KERLINE.     TOTAL SERVICE TIME: 50 minutes  TREATMENT (16178)    Milagros Landa (voice), M.S., CCC-SLP  Speech-Language Pathologist  [facility]  [phone]  [email or web address]  Pronouns: she/her/hers      *this report was created in part through the use of computerized dictation software, and though reviewed following completion, some typographic errors may persist.  If there is confusion regarding any of this notes contents, please contact me for clarification    OBJECTIVE FINDINGS  PATIENT REPORTED MEASURES  Patient Supplied Answers To Last 2 VHI Questionnaires       No data to display                   Patient Supplied Answers To Last 2 CSI Questionnaires       No data to display                   Patient Supplied Answers To Last 2 EAT Questionnaires       No data to display                  Patient Supplied Answers to Dyspnea Index Questionnaire:       No data to display

## 2024-08-01 NOTE — LETTER
8/1/2024       RE: Aris Colon  [address] [place] [id number]     Dear Colleague,    Thank you for referring your patient, Aris Colon, to the [facility] VOICE [facility] [place] [facility]. Please see a copy of my visit note below.    [facility]  PROGRESS REPORT (CPT 36083)    Patient: Aris Colon  Date of Service: 8/1/2024  Date of Last Service: 7/12/24  Number of Visits: 13/18 approved  Referring Physician: Dr. Kathy De Paz    Impressions from evaluation on 11/17/23 by Mark Dutta M.S., CCC-SLP:  Derrick Colon presents today with ongoing chronic cough which began in conjunction with workplace particle exposure, and with recent exacerbation including hemoptysis and emesis.  Laryngeal evaluation today was warranted given recent episodes of hemoptysis to ensure the patient was not recovering from hemorrhagic or ulcerative event.  This demonstrated no acute trauma which is fortunate, though diffuse change to the vocal folds and surrounding tissues from longstanding cough was seen consistent with previous evaluation if slightly worsened.  Cough response was aggressive to the application of oxymetazoline nasal spray, and guarding and light cough was noted to occur even prior to the administration, which underscores both the hypersensitivity as well as the stress base response to triggers that has developed over time.  Within the exam itself patient was able to attenuate cough through focused relaxation which bodes well for his ability to incorporate therapeutic strategies to reduce cough, though as we discussed full resolution of symptoms is often challenging due to their multifactorial nature especially in the case of irritant exposure.        SUBJECTIVE:  Since Derrick's last session, he reports the following:   His throat is hurting more than usual today.   He's been coughing a lot more  "recently.   He vomited about 8x in the past two weeks due to coughing.   He may be getting botox injections from Dr. De Paz, but it's currently in review by insurance.    OBJECTIVE:  Patient Specific Goal Metrics:      1/25/2024    11:00 AM 3/21/2024    12:00 PM 4/4/2024    12:00 PM   Dysponia SLP Goals   How severe is your cough /throat clearing, if 0 is no cough at all and 10 is the worst cough? 10 7 8   How much does your cough/throat-clearing problem bother you?            Very Much Quite a bit Very Much       THERAPEUTIC ACTIVITIES  Today Derrick participated in the following therapeutic activities:  Counseling and Education:  Asked questions about the nature of his symptoms, and I answered all of these thoroughly.    I provided Derrick with explanation and skilled instruction of:  Exercises to coordinate phonation with optimal flowing airstream and reduce phonatory impact.   Semi-occluded vocal tract exercises transitioning into M initial speech tasks were most facilitating  He progressed through the hum, syllable, and word level of phonatory complexity  He demonstrated fair ability to target a more \"forward, nasal\" resonance vs his habitual throaty resonance.   We discussed the tendency for throaty/posterior resonance to trigger more raspiness and throat irritation  Patient demonstrated good learning, but will need practice and additional reinforcement  All tasks were targeted at 130Hz.    Instruction of Home Practice:  A revised regimen for home practice was instructed.  I provided an AVS of important notes of today's therapeutic activities to facilitate practice.    ASSESSMENT/PLAN  Progress toward long-term goals:  Adequate but incomplete progress; please see above    Impressions:  IMPRESSIONS: chronic cough   Derrick had a productive session of therapy today, working on progression of resonant voice tasks.  He will continue to work on his exercises on a daily basis, and work on incorporating the techniques into " his daily activities. Speech therapy continues to be medically necessary for Derrick to participate fully and engage in activities of daily living.     Plan:   I will see Derrick in 2 weeks and will plan to continue targeting resonant voice at 130Hz, progressing from M initial syllables and words as able.     For home practice goals, see AVS.     TOTAL SERVICE TIME: 50 minutes  TREATMENT (14451)    Milargos Landa (voice), M.S., CCC-SLP  Speech-Language Pathologist  [facility] Voice [facility]  [phone]  [email or web address]  Pronouns: she/her/hers      *this report was created in part through the use of computerized dictation software, and though reviewed following completion, some typographic errors may persist.  If there is confusion regarding any of this notes contents, please contact me for clarification    OBJECTIVE FINDINGS  PATIENT REPORTED MEASURES  Patient Supplied Answers To Last 2 VHI Questionnaires       No data to display                   Patient Supplied Answers To Last 2 CSI Questionnaires       No data to display                   Patient Supplied Answers To Last 2 EAT Questionnaires       No data to display                  Patient Supplied Answers to Dyspnea Index Questionnaire:       No data to display                 Again, thank you for allowing me to participate in the care of your patient.      Sincerely,    Ora Shepard, SLP

## 2024-08-08 ENCOUNTER — PATIENT OUTREACH (OUTPATIENT)
Dept: OTOLARYNGOLOGY | Facility: CLINIC | Age: 54
End: 2024-08-08
Payer: COMMERCIAL

## 2024-08-08 NOTE — PROGRESS NOTES
Called patient as scheduling has not been able to reach patient to schedule. Asked that when patient is ready to schedule they reach out to the clinic for coordination. Patient was agreeable and verbalized understanding of the situation. Fatemeh He RN on 8/8/2024 at 11:57 AM

## 2024-08-15 ENCOUNTER — OFFICE VISIT (OUTPATIENT)
Dept: OTOLARYNGOLOGY | Facility: CLINIC | Age: 54
End: 2024-08-15
Payer: OTHER MISCELLANEOUS

## 2024-08-15 DIAGNOSIS — R05.3 CHRONIC COUGH: Primary | ICD-10-CM

## 2024-08-15 PROCEDURE — 92507 TX SP LANG VOICE COMM INDIV: CPT | Mod: GN | Performed by: SPEECH-LANGUAGE PATHOLOGIST

## 2024-08-15 NOTE — LETTER
8/15/2024       RE: Aris Colon  [address] [place] [id number]     Dear Colleague,    Thank you for referring your patient, Aris Colon, to the [facility] VOICE CLINIC [place] at [facility]. Please see a copy of my visit note below.    [facility]  PROGRESS REPORT (CPT [id number])    Patient: Aris Colon  Date of Service: 8/15/2024  Date of Last Service: 8/1/24  Number of Visits: 14/18 approved  Referring Physician: Dr. Kathy De Paz    Impressions from evaluation on 11/17/23 by Mark Dutta M.S., CCC-SLP:  Derrick Colon presents today with ongoing chronic cough which began in conjunction with workplace particle exposure, and with recent exacerbation including hemoptysis and emesis.  Laryngeal evaluation today was warranted given recent episodes of hemoptysis to ensure the patient was not recovering from hemorrhagic or ulcerative event.  This demonstrated no acute trauma which is fortunate, though diffuse change to the vocal folds and surrounding tissues from longstanding cough was seen consistent with previous evaluation if slightly worsened.  Cough response was aggressive to the application of oxymetazoline nasal spray, and guarding and light cough was noted to occur even prior to the administration, which underscores both the hypersensitivity as well as the stress base response to triggers that has developed over time.  Within the exam itself patient was able to attenuate cough through focused relaxation which bodes well for his ability to incorporate therapeutic strategies to reduce cough, though as we discussed full resolution of symptoms is often challenging due to their multifactorial nature especially in the case of irritant exposure.        SUBJECTIVE:  Since Derrick's last session, he reports the following:   His independent medical exam ended up recommending that his insurance not cover  botox injections, thinking they would not be beneficial.   Since he's not able to pursue the botox intervention, we are agreement that additional and ongoing speech therapy sessions are warranted and are likely one of the only interventions available to him. He has been making consistent, though slow progress.   His cough has been the same the past two weeks and he vomited 5x since his last session.   Talking continues to be a significant trigger for his coughing. Gas fumes, freezer air, perfumes, etc also continue to be triggers.   He's been diligent with his resonant voice exercises since his last session, but needs to split the exercises into several sections or his throat seems to get more irritated.     OBJECTIVE:  Patient Specific Goal Metrics:      1/25/2024    11:00 AM 3/21/2024    12:00 PM 4/4/2024    12:00 PM   Dysponia SLP Goals   How severe is your cough /throat clearing, if 0 is no cough at all and 10 is the worst cough? 10 7 8   How much does your cough/throat-clearing problem bother you?            Very Much Quite a bit Very Much       THERAPEUTIC ACTIVITIES  Today Derrick participated in the following therapeutic activities:  Counseling and Education:  Asked questions about the nature of his symptoms, and I answered all of these thoroughly.    I provided Derrick with explanation and skilled instruction of:  Exercises to coordinate phonation with optimal flowing airstream and reduce phonatory impact.   Semi-occluded vocal tract exercises transitioning into M initial speech tasks were most facilitating  He progressed through the hum and single word level of phonatory complexity  He demonstrated improving ability to self-monitor and identify target voice production versus habitual voice production, benefiting from use of negative practice to develop somatosensory awareness  Added focus was given today to consistent abdominal respiratory pattern, as he reports feeling increased irritation along the base of  his throat when practicing at home, indicative of more vertical respiratory pattern and subsequent laryngeal strain.   Tactile reinforcement and ongoing reinforcement demonstrated not only improved voice quality with reduced perceptual strain, but was very effective in reducing the duration and severity of subsequent coughing fits.   Patient demonstrated good learning, but will need practice and additional reinforcement    Instruction of Home Practice:  A revised regimen for home practice was instructed.  I provided an AVS of important notes of today's therapeutic activities to facilitate practice.    ASSESSMENT/PLAN  Progress toward long-term goals:  Adequate but incomplete progress; please see above    Impressions:  IMPRESSIONS: chronic cough   Derrick had a productive session of therapy today, working on progression of resonant voice tasks targeting specific frequency, with increased attention to abdominal breathing pattern.  He will continue to work on his exercises on a daily basis, and work on incorporating the techniques into his daily activities. Speech therapy continues to be medically necessary for Derrick to participate fully and engage in activities of daily living.     Plan:   I will see Derrick in 2 weeks and will plan to continue targeting abdominal respiratory pattern with resonant voice tasks, progressing from hum/single word level as able. Continue reinforcing abdominal pattern and improved posture to mitigate coughing fits.     For home practice goals, see AVS.     TOTAL SERVICE TIME: 50 minutes  TREATMENT (04129)    Milagros Landa (voice) M.S., CCC-SLP  Speech-Language Pathologist  [facility]  [phone]  [email or web address]  Pronouns: she/her/hers      *this report was created in part through the use of computerized dictation software, and though reviewed following completion, some typographic errors may persist.  If there is confusion regarding any of this notes contents,  please contact me for clarification    OBJECTIVE FINDINGS  PATIENT REPORTED MEASURES  Patient Supplied Answers To Last 2 VHI Questionnaires       No data to display                   Patient Supplied Answers To Last 2 CSI Questionnaires       No data to display                   Patient Supplied Answers To Last 2 EAT Questionnaires       No data to display                  Patient Supplied Answers to Dyspnea Index Questionnaire:       No data to display                   Again, thank you for allowing me to participate in the care of your patient.      Sincerely,    Ora Shepard, SLP

## 2024-08-15 NOTE — PROGRESS NOTES
[facility] VOICE CLINIC  PROGRESS REPORT (CPT [id number])    Patient: Aris Colon  Date of Service: 8/15/2024  Date of Last Service: 8/1/24  Number of Visits: 14/18 approved  Referring Physician: Dr. Kathy De Paz    Impressions from evaluation on 11/17/23 by Mark Dutta M.S., CCC-SLP:  Derrick Colon presents today with ongoing chronic cough which began in conjunction with workplace particle exposure, and with recent exacerbation including hemoptysis and emesis.  Laryngeal evaluation today was warranted given recent episodes of hemoptysis to ensure the patient was not recovering from hemorrhagic or ulcerative event.  This demonstrated no acute trauma which is fortunate, though diffuse change to the vocal folds and surrounding tissues from longstanding cough was seen consistent with previous evaluation if slightly worsened.  Cough response was aggressive to the application of oxymetazoline nasal spray, and guarding and light cough was noted to occur even prior to the administration, which underscores both the hypersensitivity as well as the stress base response to triggers that has developed over time.  Within the exam itself patient was able to attenuate cough through focused relaxation which bodes well for his ability to incorporate therapeutic strategies to reduce cough, though as we discussed full resolution of symptoms is often challenging due to their multifactorial nature especially in the case of irritant exposure.        SUBJECTIVE:  Since Derrick's last session, he reports the following:   His independent medical exam ended up recommending that his insurance not cover botox injections, thinking they would not be beneficial.   Since he's not able to pursue the botox intervention, we are agreement that additional and ongoing speech therapy sessions are warranted and are likely one of the only interventions available to him. He has been making consistent, though slow progress.   His cough has been the  same the past two weeks and he vomited 5x since his last session.   Talking continues to be a significant trigger for his coughing. Gas fumes, freezer air, perfumes, etc also continue to be triggers.   He's been diligent with his resonant voice exercises since his last session, but needs to split the exercises into several sections or his throat seems to get more irritated.     OBJECTIVE:  Patient Specific Goal Metrics:      1/25/2024    11:00 AM 3/21/2024    12:00 PM 4/4/2024    12:00 PM   Dysponia SLP Goals   How severe is your cough /throat clearing, if 0 is no cough at all and 10 is the worst cough? 10 7 8   How much does your cough/throat-clearing problem bother you?            Very Much Quite a bit Very Much       THERAPEUTIC ACTIVITIES  Today Derrick participated in the following therapeutic activities:  Counseling and Education:  Asked questions about the nature of his symptoms, and I answered all of these thoroughly.    I provided Derrick with explanation and skilled instruction of:  Exercises to coordinate phonation with optimal flowing airstream and reduce phonatory impact.   Semi-occluded vocal tract exercises transitioning into M initial speech tasks were most facilitating  He progressed through the hum and single word level of phonatory complexity  He demonstrated improving ability to self-monitor and identify target voice production versus habitual voice production, benefiting from use of negative practice to develop somatosensory awareness  Added focus was given today to consistent abdominal respiratory pattern, as he reports feeling increased irritation along the base of his throat when practicing at home, indicative of more vertical respiratory pattern and subsequent laryngeal strain.   Tactile reinforcement and ongoing reinforcement demonstrated not only improved voice quality with reduced perceptual strain, but was very effective in reducing the duration and severity of subsequent coughing fits.    Patient demonstrated good learning, but will need practice and additional reinforcement    Instruction of Home Practice:  A revised regimen for home practice was instructed.  I provided an AVS of important notes of today's therapeutic activities to facilitate practice.    ASSESSMENT/PLAN  Progress toward long-term goals:  Adequate but incomplete progress; please see above    Impressions:  IMPRESSIONS: chronic cough   Derrick had a productive session of therapy today, working on progression of resonant voice tasks targeting specific frequency, with increased attention to abdominal breathing pattern.  He will continue to work on his exercises on a daily basis, and work on incorporating the techniques into his daily activities. Speech therapy continues to be medically necessary for Derrick to participate fully and engage in activities of daily living.     Plan:   I will see Derrick in 2 weeks and will plan to continue targeting abdominal respiratory pattern with resonant voice tasks, progressing from hum/single word level as able. Continue reinforcing abdominal pattern and improved posture to mitigate coughing fits.     For home practice goals, see AVS.     TOTAL SERVICE TIME: 50 minutes  TREATMENT ([id number])    Milagros Landa (voice), M.S., CCC-SLP  Speech-Language Pathologist  [facility]  [phone]  [email or web address]  Pronouns: she/her/hers      *this report was created in part through the use of computerized dictation software, and though reviewed following completion, some typographic errors may persist.  If there is confusion regarding any of this notes contents, please contact me for clarification    OBJECTIVE FINDINGS  PATIENT REPORTED MEASURES  Patient Supplied Answers To Last 2 VHI Questionnaires       No data to display                   Patient Supplied Answers To Last 2 CSI Questionnaires       No data to display                   Patient Supplied Answers To Last 2 EAT Questionnaires        No data to display                  Patient Supplied Answers to Dyspnea Index Questionnaire:       No data to display

## 2024-08-16 ENCOUNTER — DOCUMENTATION ONLY (OUTPATIENT)
Dept: OTOLARYNGOLOGY | Facility: CLINIC | Age: 54
End: 2024-08-16
Payer: COMMERCIAL

## 2024-08-16 DIAGNOSIS — R05.3 CHRONIC COUGH: Primary | ICD-10-CM

## 2024-08-16 DIAGNOSIS — J38.7 IRRITABLE LARYNX SYNDROME: ICD-10-CM

## 2024-08-16 NOTE — PROGRESS NOTES
Order for 10 sessions of  speech therapy faxed to Paulette العلي at [facility] vocation [facility]. Fax number [phone]. [facility] faxed.

## 2024-08-27 ENCOUNTER — DOCUMENTATION ONLY (OUTPATIENT)
Dept: OTOLARYNGOLOGY | Facility: CLINIC | Age: 54
End: 2024-08-27
Payer: COMMERCIAL

## 2024-08-27 DIAGNOSIS — J38.5 LARYNGEAL SPASM: Primary | ICD-10-CM

## 2024-08-27 DIAGNOSIS — J38.3 LARYNGEAL DYSTONIA: ICD-10-CM

## 2024-08-29 ENCOUNTER — VIRTUAL VISIT (OUTPATIENT)
Dept: OTOLARYNGOLOGY | Facility: CLINIC | Age: 54
End: 2024-08-29
Attending: OTOLARYNGOLOGY
Payer: OTHER MISCELLANEOUS

## 2024-08-29 DIAGNOSIS — R05.3 CHRONIC COUGH: Primary | ICD-10-CM

## 2024-08-29 PROCEDURE — 92507 TX SP LANG VOICE COMM INDIV: CPT | Mod: GN | Performed by: SPEECH-LANGUAGE PATHOLOGIST

## 2024-08-29 NOTE — LETTER
8/29/2024       RE: Aris Colon  [address] [place] [id number]     Dear Colleague,    Thank you for referring your patient, Aris Colon, to the [facility] VOICE CLINIC [place] at [facility]. Please see a copy of my visit note below.    Derrick Colon is a 54 year old male who is being evaluated via a billable video visit.      Derrick has been notified and verbally consented to the following:   This video visit will be conducted between you and your provider.  Patient has opted to conduct today's video visit vs an in-person appointment.   Video visits are billed at different rates depending on your insurance coverage. Please reach out to your insurance provider with any questions.   If during the course of the call the provider feels the appointment is not appropriate, you will not be charged for this service.  Provider has received verbal consent for billable virtual visit from the patient? Yes  Will anyone else be joining your video visit? No    Call initiated at: 1100   Type of Visit Platform Used: [employer] Video  Location of provider: [facility] Voice [facility]  Location of patient: [facility] VOICE [facility]  PROGRESS REPORT (CPT [id number])    Patient: Aris Colon  Date of Service: 8/29/2024  Date of Last Service: 8/15/24  Number of Visits: 15/18 approved (have requested 10 more)  Referring Physician: Dr. Kathy De Paz    Impressions from evaluation on 11/17/23 by Mark Dutta M.S., CCC-SLP:  Derrick Colon presents today with ongoing chronic cough which began in conjunction with workplace particle exposure, and with recent exacerbation including hemoptysis and emesis.  Laryngeal evaluation today was warranted given recent episodes of hemoptysis to ensure the patient was not recovering from hemorrhagic or ulcerative event.  This demonstrated no acute trauma which is fortunate, though diffuse change to  the vocal folds and surrounding tissues from longstanding cough was seen consistent with previous evaluation if slightly worsened.  Cough response was aggressive to the application of oxymetazoline nasal spray, and guarding and light cough was noted to occur even prior to the administration, which underscores both the hypersensitivity as well as the stress base response to triggers that has developed over time.  Within the exam itself patient was able to attenuate cough through focused relaxation which bodes well for his ability to incorporate therapeutic strategies to reduce cough, though as we discussed full resolution of symptoms is often challenging due to their multifactorial nature especially in the case of irritant exposure.      SUBJECTIVE:  Since Derrick's last session, he reports the following:   He reports being really stressed recently because he has to go to court next week and has been receiving a lot of information in the mail from the insurance company.   He's still coughing a lot and threw up about 4x since his last appointment.   He's been pulling himself away from social activities to avoid cough triggers.  Triggers continue to include talking, cold air/freezers, fumes/chemicals/perfumes, body odors, food odors, and elevated emotions.  He struggles to know if he's been doing the voice exercises correctly, but does continue to monitor his posture and breathing,     OBJECTIVE:  Patient Specific Goal Metrics:      3/21/2024    12:00 PM 4/4/2024    12:00 PM 8/29/2024    11:00 AM   Dysponia SLP Goals   How severe is your cough /throat clearing, if 0 is no cough at all and 10 is the worst cough? 7 8 7   How much does your cough/throat-clearing problem bother you?            Quite a bit Very Much Quite a bit     FO: 130-138Hz in spontaneous    THERAPEUTIC ACTIVITIES  Today Derrick participated in the following therapeutic activities:  Counseling and Education:  Asked questions about the nature of his  "symptoms, and I answered all of these thoroughly.  Discussion regarding his ongoing cough triggers and the goal of first improving the muscular patterns for phonation to begin reducing laryngeal irritation and hypersensitivity.   Ongoing verbal and visual cues for straw sip and balloon strategy to reduce coughing, including verbal prompts for \"chest up, shoulders back.\"   Discussion of his gradual improvement in cough, from initial therapy sessions when he couldn't speak without coughing fits and resulting vomiting, to today's session when he only had six single coughs, mitigated with strategies, and 10 throat clears.     I provided Derrick with explanation and skilled instruction of:  Exercises to coordinate phonation with optimal flowing airstream and reduce phonatory impact.   Semi-occluded vocal tract exercises transitioning into M initial speech tasks were most facilitating  He progressed through the syllable, word, and phrase level of phonatory complexity  He demonstrated significant improving ability to self-monitor and identify target voice production versus habitual voice production, benefiting from use of negative practice to develop somatosensory awareness  Patient demonstrated good learning, but will need practice and additional reinforcement  Instruction of Home Practice:  A revised regimen for home practice was instructed.  I provided an AVS of important notes of today's therapeutic activities to facilitate practice.    ASSESSMENT/PLAN  Progress toward long-term goals:  Adequate but incomplete progress; please see above    Impressions:  IMPRESSIONS: chronic cough   Derrick had a productive session of therapy today, working on progression of resonant voice tasks. He is notably beginning to habitualize target fundamental frequency, with coinciding reduction in coughing fits. He will continue to work on his exercises on a daily basis, and work on incorporating the techniques into his daily activities. Speech " therapy continues to be medically necessary for Derrick to participate fully and engage in activities of daily living.     Plan:   I will see Derrick in 4 weeks and will plan to continue targeting abdominal respiratory pattern with resonant voice tasks, progressing from word/phrase level as able. Continue reinforcing abdominal pattern and improved posture with sip & balloon to mitigate coughing fits.     For home practice goals, see AVS.     TOTAL SERVICE TIME: 55 minutes  TREATMENT ([id number])    Milagros Landa (voice), M.S., CCC-SLP  Speech-Language Pathologist  [facility]  [phone]  [email or web address]  Pronouns: she/her/hers      *this report was created in part through the use of computerized dictation software, and though reviewed following completion, some typographic errors may persist.  If there is confusion regarding any of this notes contents, please contact me for clarification    OBJECTIVE FINDINGS  PATIENT REPORTED MEASURES  Patient Supplied Answers To Last 2 VHI Questionnaires       No data to display                   Patient Supplied Answers To Last 2 CSI Questionnaires      8/29/2024    10:48 AM   Cough Severity Index (CSI)   My cough is worse when I lie down 3   My coughing problem causes me to restrict my personal and social life 4   I tend to avoid places because of my cough problem 3   I feel embarrassed because of my coughing problem 4   People ask, ''What's wrong?'' because I cough a lot 4   I run out of air when I cough 4   My coughing problem affects my voice 3   My coughing problem limits my physical activity 4   My coughing problem upsets me 4   People ask me if I am sick because I cough a lot 4   CSI Score 37          Patient Supplied Answers To Last 2 EAT Questionnaires       No data to display                  Patient Supplied Answers to Dyspnea Index Questionnaire:       No data to display                 Again, thank you for allowing me to participate in the care of  your patient.      Sincerely,    Ora Shepard, SLP

## 2024-08-29 NOTE — PATIENT INSTRUCTIONS
"Hello!    It was a pleasure to see you today. Please complete the exercises below and remember, a few minutes of practice many times throughout the day is more important than one large practice session. If you have any questions about your strategies, feel free to contact me at [email or web address] or via [employer]. Please call [phone] for scheduling alterations or to be seen sooner in case of cancellations.     Home Exercise Program:  When you start to cough:   SIPPY BREATH IN & BALLOON CHEEKS OUT, SIP & BALLOON, SIP & BALLOON...  Chest up and shoulders back, belly crunch        BUZZY VOICE- ALL .6Hz or C#3  Practice throughout the day, whenever you get a chance  1. 10x \"hmmm\" for 5 seconds   Focus on \"nasal\" or \"twangy\" vibrations in your lips/nose on \"mmm\"  Belly muscles crunches upward and inward  2. Hmm...moomoomoo, moemoemoe, mememe, mamama, mymymy, maymaymay, mehmehmeh  Focus on \"nasal\" or \"twangy\" vibrations in your lips/nose on \"mmm\"  Belly muscles crunches upward and inward  3. Hmm...moon, mom, mean, man, mine, moan, men, main, morning, meaning, manners, mammals  Focus on \"nasal\" or \"twangy\" vibrations in your lips/nose on \"mmm\"  Belly muscles crunches upward and inward  Chest stays tall  4. Sentences: Still chanted on 138.59Hz   Hmm...Mother knits many mittens.   Nobody knows Alejandra s name.    Magaly s measles made Magaly miserable.   Milking machines make much noise.   Mild-mannered Kylie  sunday Rodrigues.   Kelvin marched many, many miles.   Millions made money in the market in March.   My mother munches melon on Mondays.  Maybe my memory needs mending.   Genesis made more money.   Magaly made many muffins.   Merlin motored many miles.   Kelvin makes major messes.   My momma made meatloaf.   Music makes mellow moods.    Chanted on 1 pitch (138.59Hz or C#3) for now, like a monk or robot.   Focus on \"nasal\" or \"twangy\" vibrations in your lips/nose on \"mmm\"  Belly muscles crunches upward and " inward    Thank you and have a great day!  Rosendo Marmolejo. (voice) M.S., CCC-SLP  Speech-Language Pathologist  [facility]  [phone]  [email or web address]  Pronouns: she/her/hers

## 2024-08-29 NOTE — PROGRESS NOTES
Derrick Colon is a 54 year old male who is being evaluated via a billable video visit.      Derrick has been notified and verbally consented to the following:   This video visit will be conducted between you and your provider.  Patient has opted to conduct today's video visit vs an in-person appointment.   Video visits are billed at different rates depending on your insurance coverage. Please reach out to your insurance provider with any questions.   If during the course of the call the provider feels the appointment is not appropriate, you will not be charged for this service.  Provider has received verbal consent for billable virtual visit from the patient? Yes  Will anyone else be joining your video visit? No    Call initiated at: 1100   Type of Visit Platform Used: [employer] Video  Location of provider: [facility]  Location of patient: [facility]  PROGRESS REPORT (CPT 01275)    Patient: Aris Colon  Date of Service: 8/29/2024  Date of Last Service: 8/15/24  Number of Visits: 15/18 approved (have requested 10 more)  Referring Physician: Dr. Kathy De Paz    Impressions from evaluation on 11/17/23 by Mark Dutta M.S., CCC-SLP:  Derrick Colon presents today with ongoing chronic cough which began in conjunction with workplace particle exposure, and with recent exacerbation including hemoptysis and emesis.  Laryngeal evaluation today was warranted given recent episodes of hemoptysis to ensure the patient was not recovering from hemorrhagic or ulcerative event.  This demonstrated no acute trauma which is fortunate, though diffuse change to the vocal folds and surrounding tissues from longstanding cough was seen consistent with previous evaluation if slightly worsened.  Cough response was aggressive to the application of oxymetazoline nasal spray, and guarding and light cough was noted to occur even prior to the administration, which underscores both the hypersensitivity  as well as the stress base response to triggers that has developed over time.  Within the exam itself patient was able to attenuate cough through focused relaxation which bodes well for his ability to incorporate therapeutic strategies to reduce cough, though as we discussed full resolution of symptoms is often challenging due to their multifactorial nature especially in the case of irritant exposure.      SUBJECTIVE:  Since Derrick's last session, he reports the following:   He reports being really stressed recently because he has to go to court next week and has been receiving a lot of information in the mail from the insurance company.   He's still coughing a lot and threw up about 4x since his last appointment.   He's been pulling himself away from social activities to avoid cough triggers.  Triggers continue to include talking, cold air/freezers, fumes/chemicals/perfumes, body odors, food odors, and elevated emotions.  He struggles to know if he's been doing the voice exercises correctly, but does continue to monitor his posture and breathing,     OBJECTIVE:  Patient Specific Goal Metrics:      3/21/2024    12:00 PM 4/4/2024    12:00 PM 8/29/2024    11:00 AM   Dysponia SLP Goals   How severe is your cough /throat clearing, if 0 is no cough at all and 10 is the worst cough? 7 8 7   How much does your cough/throat-clearing problem bother you?            Quite a bit Very Much Quite a bit     FO: 130-138Hz in spontaneous    THERAPEUTIC ACTIVITIES  Today Derrick participated in the following therapeutic activities:  Counseling and Education:  Asked questions about the nature of his symptoms, and I answered all of these thoroughly.  Discussion regarding his ongoing cough triggers and the goal of first improving the muscular patterns for phonation to begin reducing laryngeal irritation and hypersensitivity.   Ongoing verbal and visual cues for straw sip and balloon strategy to reduce coughing, including verbal prompts for  "\"chest up, shoulders back.\"   Discussion of his gradual improvement in cough, from initial therapy sessions when he couldn't speak without coughing fits and resulting vomiting, to today's session when he only had six single coughs, mitigated with strategies, and 10 throat clears.     I provided Derrick with explanation and skilled instruction of:  Exercises to coordinate phonation with optimal flowing airstream and reduce phonatory impact.   Semi-occluded vocal tract exercises transitioning into M initial speech tasks were most facilitating  He progressed through the syllable, word, and phrase level of phonatory complexity  He demonstrated significant improving ability to self-monitor and identify target voice production versus habitual voice production, benefiting from use of negative practice to develop somatosensory awareness  Patient demonstrated good learning, but will need practice and additional reinforcement  Instruction of Home Practice:  A revised regimen for home practice was instructed.  I provided an AVS of important notes of today's therapeutic activities to facilitate practice.    ASSESSMENT/PLAN  Progress toward long-term goals:  Adequate but incomplete progress; please see above    Impressions:  IMPRESSIONS: chronic cough   Derrick had a productive session of therapy today, working on progression of resonant voice tasks. He is notably beginning to habitualize target fundamental frequency, with coinciding reduction in coughing fits. He will continue to work on his exercises on a daily basis, and work on incorporating the techniques into his daily activities. Speech therapy continues to be medically necessary for Derrick to participate fully and engage in activities of daily living.     Plan:   I will see Derrick in 4 weeks and will plan to continue targeting abdominal respiratory pattern with resonant voice tasks, progressing from word/phrase level as able. Continue reinforcing abdominal pattern and improved " posture with sip & balloon to mitigate coughing fits.     For home practice goals, see AVS.     TOTAL SERVICE TIME: 55 minutes  TREATMENT (01564)    Milagros Landa (voice), M.S., CCC-SLP  Speech-Language Pathologist  [facility]  [phone]  [email or web address]  Pronouns: she/her/hers      *this report was created in part through the use of computerized dictation software, and though reviewed following completion, some typographic errors may persist.  If there is confusion regarding any of this notes contents, please contact me for clarification    OBJECTIVE FINDINGS  PATIENT REPORTED MEASURES  Patient Supplied Answers To Last 2 VHI Questionnaires       No data to display                   Patient Supplied Answers To Last 2 CSI Questionnaires      8/29/2024    10:48 AM   Cough Severity Index (CSI)   My cough is worse when I lie down 3   My coughing problem causes me to restrict my personal and social life 4   I tend to avoid places because of my cough problem 3   I feel embarrassed because of my coughing problem 4   People ask, ''What's wrong?'' because I cough a lot 4   I run out of air when I cough 4   My coughing problem affects my voice 3   My coughing problem limits my physical activity 4   My coughing problem upsets me 4   People ask me if I am sick because I cough a lot 4   CSI Score 37          Patient Supplied Answers To Last 2 EAT Questionnaires       No data to display                  Patient Supplied Answers to Dyspnea Index Questionnaire:       No data to display

## 2024-09-03 ENCOUNTER — TELEPHONE (OUTPATIENT)
Dept: OTOLARYNGOLOGY | Facility: CLINIC | Age: 54
End: 2024-09-03
Payer: COMMERCIAL

## 2024-09-03 NOTE — TELEPHONE ENCOUNTER
LVM to reschedule 9/26 visit to RSVV or PAOVR (not PAOR)  slot nearby as provider wants to see patient virtually now. Then please add 2 additional 1x/month virtual visits. You can let him know that 3 more visits are in the insurance approved count.       Gave ENT number

## 2024-09-04 NOTE — PROGRESS NOTES
Derrick is a 54 year old who is being evaluated via a billable video visit.    How would you like to obtain your AVS? MyChart  If the video visit is dropped, the invitation should be resent by: Text to cell phone: [phone]  Will anyone else be joining your video visit? No    Video-Visit Details    Type of service:  Video Visit   Video End Time: 10:20  Originating Location (pt. Location): Home    Distant Location (provider location):  On-site  [facility]   at the [facility]   Otolaryngology Clinic     Patient: Aris Colon    MRN: [id number]    : 1970    Age/Gender: 54 year old male  Date of Service: 2024  Rendering Provider:   Kathy De Paz MD     Chief Complaint   Cough  R SLN block 24    Impression & Plan     IMPRESSION: Mr. 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 A few subpleural nodules in the right middle lobe of the lung measuring up to 3 mm are unchanged as is a 3 mm left lower lobe nodule. These are probably benign.  Ascending thoracic aortic aneurysm measuring 46 mm unchanged. Patient subglottis  - no ACEI   - allergy triggers and work up: none  - pulmonary triggers and work up: working with pulm, had chest CT and bronchoscopy, has inhalers and tessalon perles  - GI triggers and work up: wakes up coughing at night, denies feeling reflux, vomits from coughing  - ENT triggers and work up: associated with fragrances, heat, humidity, and moisture  - scope shows postcricoid edema  - symptoms likely due to irritable larynx syndrome  and laryngopharyngeal reflux, does have coughing at night and snoring so need to rule out sleep apnea  - symptoms today 7/9/24 not better, though he has been working with [employer], can't live his life, constantly coughing and vomiting, he is very distraught, SLN block did not work, made things worse  - reviewed SLP note 6/13/24 and [facility] note 6/11/24, pulm note 11/13/23 - on inhaler, reviewed Xray Video Swallow Exam at swallow rounds 9/13/23 - normal  - discussed starting neuromodulation vs botox   - will start with neuromodulation, reports trying gabapentin before so will start with lyrica instead, will also put in the request for botox   - symptoms 9/5/2024 are worsened given the current upheavel with his botox claim, had to go to court  - does not have side effects from lyrica, discussed maximizing the dosing by increasing to 2 pills in the evening and continuing with voice therapy    Plan  - voice therapy  - continue lyrica   - pending botox          RETURN VISIT: 2-3 months virtual      Interval History   HISTORY OF PRESENT ILLNESS: Mr. Colon is a 54 year old male is being followed for cough.   he was initially seen on 7/9/24. Please refer to this note for full history.    Today, he presents for follow up. he reports    - still coughing     PAST MEDICAL HISTORY: No past medical history on file.    PAST SURGICAL HISTORY: No past surgical history on file.    CURRENT MEDICATIONS:   Current Outpatient Medications:     albuterol (PROAIR HFA/PROVENTIL HFA/VENTOLIN HFA) 108 (90 Base) MCG/ACT inhaler, 2 puffs every 4 hours as needed, Disp: , Rfl:     benzonatate (TESSALON) 100 MG capsule, Take 1 capsule (100 mg) by mouth 3 times daily as needed for cough, Disp: 30 capsule, Rfl: 4    FLUoxetine (PROZAC) 20 MG capsule, , Disp: , Rfl:     fluticasone-vilanterol (BREO ELLIPTA) 100-25 MCG/ACT inhaler, , Disp: , Rfl:     hydrOXYzine (ATARAX) 10 MG tablet, , Disp: , Rfl:     LANsoprazole (PREVACID) 30 MG DR capsule, Take  30 mg by mouth, Disp: , Rfl:     omeprazole (PRILOSEC) 20 MG DR capsule, TAKE 2 CAPSULES(40 MG) BY MOUTH TWICE DAILY, Disp: , Rfl:     pantoprazole (PROTONIX) 40 MG EC tablet, Take 1 tablet (40 mg) by mouth 2 times daily, Disp: 180 tablet, Rfl: 2    pregabalin (LYRICA) 75 MG capsule, Take 1 capsule (75 mg) by mouth daily for 2 days, THEN 1 capsule (75 mg) 2 times daily for 2 days, THEN 1 capsule (75 mg) 3 times daily for 42 days., Disp: 132 capsule, Rfl: 0    sildenafil (REVATIO) 20 MG tablet, TAKE 1-5 TABLETS BY MOUTH 30 MINS TO 4 HOURS BEFORE SEXUAL ACTIVITY, Disp: , Rfl:     tiotropium (SPIRIVA RESPIMAT) 2.5 MCG/ACT inhaler, Inhale 2 puffs into the lungs, Disp: , Rfl:     Current Facility-Administered Medications:     botulinum toxin type A (BOTOX) 100 units injection 6 Units, 6 Units, Intramuscular, Q90 Days, Kathy De Paz MD    ALLERGIES: Patient has no known allergies.    SOCIAL HISTORY:    Social History     Socioeconomic History    Marital status:      Spouse name: Not on file    Number of children: Not on file    Years of education: Not on file    Highest education level: Not on file   Occupational History    Not on file   Tobacco Use    Smoking status: Never     Passive exposure: Never    Smokeless tobacco: Never   Vaping Use    Vaping status: Never Used   Substance and Sexual Activity    Alcohol use: Not on file    Drug use: Not on file    Sexual activity: Not on file   Other Topics Concern    Not on file   Social History Narrative    Not on file     Social Determinants of Health     Financial Resource Strain: High Risk (1/1/2022)    Received [facility], [facility]    Financial Resource Strain     Difficulty of Paying Living Expenses: Not on file     Difficulty of Paying Living Expenses: Not on file   Food Insecurity: Not on file   Transportation Needs: Not on file   Physical Activity: Not on file   Stress: Not on file    Social Connections: Unknown (1/1/2022)    [facility], [facility]    Social Connections     Frequency of Communication with Friends and Family: Not on file   Interpersonal Safety: Not on file   Housing Stability: Not on file         FAMILY HISTORY: No family history on file.   Non-contributory for problems with anesthesia    REVIEW OF SYSTEMS:   The patient was asked a 14 point review of systems regarding constitutional symptoms, eye symptoms, ears, nose, mouth, throat symptoms, cardiovascular symptoms, respiratory symptoms, gastrointestinal symptoms, genitourinary symptoms, musculoskeletal symptoms, integumentary symptoms, neurological symptoms, psychiatric symptoms, endocrine symptoms, hematologic/lymphatic symptoms, and allergic/ immunologic symptoms.   The pertinent factors have been included in the HPI and below.  Patient Supplied Answers to Review of Systems      7/9/2024     8:08 AM    ENT ROS   Constitutional Weight gain       Physical Examination   The patient underwent a physical examination as described below. The pertinent positive and negative findings are summarized after the description of the examination.  Constitutional: The patient's developmental and nutritional status was assessed. The patient's voice quality was assessed.  Head and Face: The head and face were inspected for deformities. Facial muscle strength was assessed bilaterally.  Eyes: Extraocular movements and primary gaze alignment were assessed.  Ears, Nose, Mouth and Throat: The ears and nose were examined for deformities.The lips were examined for abnormalities.   Neck: The neck was visualized for abnormal neck masses  Respiratory: The nature of the breathing was observed.  Extremities: The hand extremities were examined for any clubbing or cyanosis.  Skin: The skin was examined for inflammatory or neoplastic conditions.  Neurologic: The patient's  orientation, mood, and affect were noted. The cranial nerve  functions were examined.  Other pertinent positive and negative findings on physical examination:   Breathing comfortably on room air, no stridor with deep inspiration  Coughing throughout the visit     All other physical examination findings were within normal limits and noncontributory   Review of Relevant Clinical Data   I personally reviewed:  Notes:   Ora Shepard, SLP 7/12/24  Since Derrick's last session, he reports the following:   He feels like the area around his larynx has been closed up and constricted.   He's vomited 10 times since his last session, almost an 11th time while waiting in the waiting room today next to someone wearing perfume. Triggers have included food smells, talking, wet dog, rain, humidity, a/c in car, hot air, chilly air, smoke from fireworks, etc.   He expressed significant frustration with the severity of impact on his QOL which the chronic coughing continues to have, stating he can barely leave the house without fear of triggers and possibly vomiting in public.     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 SLP staff have noticed objective evidence for the same - continued training with SLP at [facility] is key to his recovery. Botox injections are planned, and hopefully they will help change the course of his long illness. I have at multiple times offered to help him seek another opinion at [facility] -- he again declines this offer today. He's been away from all dust exposure for many months. Continuing the work restriction for now is  still necessary.   The chest CT and bronchoscopy in December 2022 did not reveal parenchymal lung disease or lower airway disease.   We still recommend he use the ICS/LABA and tiotropium. He may use albuterol PRN. (Formulary changes mandated changing ICS/LABA product, and the albuterol from HHI to nebulizer (he declined nebulizer). Continue BID PPI.   See work restriction below. We will follow up by December 2024, when I will update the work restrictions.   Routine environmental allergy testing was negative earlier. No sign of eosinophilia. 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 He is already on appropriate treatment, currently unable to do PFTs: any forced expiration would likely be marked by severe cough artifact and thus be uninterpretable. Once symptoms come under much better control, we hope to check PFTs and methacholine challenge, to confirm obstruction and thus RADS as the diagnosis. We've seen some progress in suppression/control of cough but it's been very difficult for him to employ during a coughing spell; staff at [facility] have coached him and witnessed him effectively suppressing/aborting coughing spells, which gives us hope and reason to continue to work with [facility] as much as possible.   I have encouraged him in the past to continue to try to exercise. He's been limited due to the severity of the cough and dyspnea, and it's not realistic to expect that he could exercise under these circumstances.  He has insomnia and emotional disturbances due to the pervasive, life-altering refractory cough and the treatment from various elements of the support (including the recent vocational evaluation). I again recommended he consider seeing a mental health professional.   Laryngeal  "hyperreactivity/supraglottic hyperfunction:  He has seen [facility], who recommended a series of treatments - these are proving effective, but he still needs more training with them; plans for Botox may also help. There is hope for a more comfortable existence that is not dominated by refractory cough but we are still, frustratingly, very far from that time.   Remote history of minimal tobacco use but no ongoing exposure or vaping. No sign of underlying preexisting respiratory disease. he has had a long history of a high level of exercise tolerance (2 hours routinely in the past).   Workability -- he is still limited by cough and dyspnea but there are sedentary jobs he can do. He and the [facility] are working to clarify and eventually seek employment - so far, no prospects have panned out. See the work restriction letters below (two restriction letters exist: one for [employer], and one for general use).   Ascending aorta dilation -- I previously informed him of this finding and advised him to see his primary care clinic to determine the next steps. It is not likely related to the cough.   Tiny lung nodules -- in a nonsmoker, these are not of consequence and do not need follow up.     Follow up: by the end of December 2024     Labs:  No results found for: \"TSH\"  No results found for: \"NA\", \"CO2\", \"BUN\", \"CREAT\", \"GLUCOSE\", \"PHOS\"  No results found for: \"WBC\", \"HGB\", \"HCT\", \"MCV\", \"PLT\"  No results found for: \"PT\", \"PTT\", \"INR\"  No results found for: \"MAREN\"  No components found for: \"RHEUMATOIDFACTOR\", \"RF\"  No results found for: \"CRP\"  No components found for: \"CKTOT\", \"URICACID\"  No components found for: \"C3\", \"C4\", \"DSDNAAB\", \"NDNAABIFA\"  No results found for: \"MPOAB\"    Patient reported Quality of Life (QOL) Measures   Patient Supplied Answers To VHI Questionnaire       No data to display                  Patient Supplied Answers To EAT Questionnaire       No data to display                  Patient Supplied " Answers To CSI Questionnaire      8/29/2024    10:48 AM   Cough Severity Index (CSI)   My cough is worse when I lie down 3   My coughing problem causes me to restrict my personal and social life 4   I tend to avoid places because of my cough problem 3   I feel embarrassed because of my coughing problem 4   People ask, ''What's wrong?'' because I cough a lot 4   I run out of air when I cough 4   My coughing problem affects my voice 3   My coughing problem limits my physical activity 4   My coughing problem upsets me 4   People ask me if I am sick because I cough a lot 4   CSI Score 37         Patient Supplied Answers to Dyspnea Index Questionnaire:       No data to display               Scribe Disclosure:   I, Jessica English, am serving as a scribe; to document services personally performed by Kathy De Paz MD -based on data collection and the provider's statements to me.     Provider Disclosure:  I agree with above History, Review of Systems, Physical exam and Plan.  I have reviewed the content of the documentation and have edited it as needed. I have personally performed the services documented here and the documentation accurately represents those services and the decisions I have made.      Kathy De Paz MD    Laryngology    [facility]  Department of  Otolaryngology - Head and Neck Surgery  Clinics & Surgery Center  [address] [place] [address]  Appointment line: [phone]  Fax: [phone]    30 minutes spent on the date of the encounter doing chart review, patient visit, and documentation

## 2024-09-05 ENCOUNTER — VIRTUAL VISIT (OUTPATIENT)
Dept: OTOLARYNGOLOGY | Facility: CLINIC | Age: 54
End: 2024-09-05
Payer: OTHER MISCELLANEOUS

## 2024-09-05 VITALS — HEIGHT: 70 IN | WEIGHT: 210 LBS | BODY MASS INDEX: 30.06 KG/M2

## 2024-09-05 DIAGNOSIS — R05.3 CHRONIC COUGH: Primary | ICD-10-CM

## 2024-09-05 PROCEDURE — 99214 OFFICE O/P EST MOD 30 MIN: CPT | Mod: 95 | Performed by: OTOLARYNGOLOGY

## 2024-09-05 RX ORDER — PREGABALIN 75 MG/1
CAPSULE ORAL
Qty: 120 CAPSULE | Refills: 2 | Status: SHIPPED | OUTPATIENT
Start: 2024-09-05

## 2024-09-05 NOTE — PATIENT INSTRUCTIONS
1.  You were seen in the [facility] today by Dr. De Paz. If you have any questions or concerns after your appointment, please call [phone]. Press option #1 for scheduling related needs. Press option #3 for Nurse advice.    2.  Dr. De Paz has recommended the following:   - Lyrica take as instructed and review with pharmacist upon      3.  Plan is to return to clinic 11/12 at 10:20 am     How to Contact Us:  Send a Melinta message to your provider. Our team will respond to you via Melinta. Occasionally, we will need to call you to get further information.  For urgent matters (Monday-Friday, 8:00 AM-3:30 PM), call the ENT Clinic: [phone] and speak with a call center team member - they will route your call appropriately.   If you'd like to speak directly with a nurse, please call [phone]. We do our best to check voicemail frequently throughout the day, and will work to call you back within 1-2 days. For urgent matters, please use the general clinic phone numbers listed above.      Fatemeh He  [phone]  [facility]

## 2024-09-05 NOTE — Clinical Note
2024       RE: Aris Colon  [address]  [facility] [address]     Dear Colleague,    Thank you for referring your patient, Aris Colon, to the [facility] EAR NOSE AND THROAT CLINIC [place] at [facility]. Please see a copy of my visit note below.        [facility]   at the [facility]   Otolaryngology Clinic     Patient: Aris Colon    MRN: [id number]    : 1970    Age/Gender: 54 year old male  Date of Service: 2024  Rendering Provider:   Kathy De Paz MD     Chief Complaint   Cough  R SLN block 24      Impression & Plan     IMPRESSION: Mr. 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 A few subpleural nodules in the right middle lobe of the lung measuring up to 3 mm are unchanged as is a 3 mm left lower lobe nodule. These are probably benign.  Ascending thoracic aortic aneurysm measuring 46 mm unchanged. Patient subglottis  - no ACEI   - allergy triggers and work up: none  - pulmonary triggers and work up: working with pulm, had chest CT and bronchoscopy, has inhalers and tessalon perles  - GI triggers and work up: wakes up coughing at night, denies feeling reflux, vomits from coughing  - ENT triggers and work up: associated with fragrances, heat, humidity, and moisture  - scope shows postcricoid edema  - symptoms likely due to irritable larynx syndrome and laryngopharyngeal reflux, does have coughing at night and snoring so need to rule out sleep apnea  - symptoms today 24 not better,  though he has been working with [employer], can't live his life, constantly coughing and vomiting, he is very distraught, SLN block did not work, made things worse  - reviewed SLP note 6/13/24 and [facility] note 6/11/24, pulm note 11/13/23 - on inhaler, reviewed Xray Video Swallow Exam at swallow rounds 9/13/23 - normal  - discussed starting neuromodulation vs botox   - will start with neuromodulation, reports trying gabapentin before so will start with lyrica instead, will also put in the request for botox   Plan  ***     RETURN VISIT: ***      Interval History   HISTORY OF PRESENT ILLNESS: Mr. Colon is a 54 year old male is being followed for cough.   he was initially seen on 7/9/24. Please refer to this note for full history.   Of note ***    Today, he presents ***. he reports      ***    PAST MEDICAL HISTORY: No past medical history on file.    PAST SURGICAL HISTORY: No past surgical history on file.    CURRENT MEDICATIONS:   Current Outpatient Medications:     albuterol (PROAIR HFA/PROVENTIL HFA/VENTOLIN HFA) 108 (90 Base) MCG/ACT inhaler, 2 puffs every 4 hours as needed, Disp: , Rfl:     benzonatate (TESSALON) 100 MG capsule, Take 1 capsule (100 mg) by mouth 3 times daily as needed for cough, Disp: 30 capsule, Rfl: 4    FLUoxetine (PROZAC) 20 MG capsule, , Disp: , Rfl:     fluticasone-vilanterol (BREO ELLIPTA) 100-25 MCG/ACT inhaler, , Disp: , Rfl:     hydrOXYzine (ATARAX) 10 MG tablet, , Disp: , Rfl:     LANsoprazole (PREVACID) 30 MG DR capsule, Take 30 mg by mouth, Disp: , Rfl:     omeprazole (PRILOSEC) 20 MG DR capsule, TAKE 2 CAPSULES(40 MG) BY MOUTH TWICE DAILY, Disp: , Rfl:     pantoprazole (PROTONIX) 40 MG EC tablet, Take 1 tablet (40 mg) by mouth 2 times daily, Disp: 180 tablet, Rfl: 2    pregabalin (LYRICA) 75 MG capsule, Take 1 capsule (75 mg) by mouth daily for 2 days, THEN 1 capsule (75 mg) 2 times daily for 2 days, THEN 1 capsule (75 mg) 3 times daily for 42 days., Disp: 132 capsule, Rfl: 0     sildenafil (REVATIO) 20 MG tablet, TAKE 1-5 TABLETS BY MOUTH 30 MINS TO 4 HOURS BEFORE SEXUAL ACTIVITY, Disp: , Rfl:     tiotropium (SPIRIVA RESPIMAT) 2.5 MCG/ACT inhaler, Inhale 2 puffs into the lungs, Disp: , Rfl:     Current Facility-Administered Medications:     botulinum toxin type A (BOTOX) 100 units injection 6 Units, 6 Units, Intramuscular, Q90 Days, Kathy De Paz MD    ALLERGIES: Patient has no known allergies.    SOCIAL HISTORY:    Social History     Socioeconomic History    Marital status:      Spouse name: Not on file    Number of children: Not on file    Years of education: Not on file    Highest education level: Not on file   Occupational History    Not on file   Tobacco Use    Smoking status: Never     Passive exposure: Never    Smokeless tobacco: Never   Vaping Use    Vaping status: Never Used   Substance and Sexual Activity    Alcohol use: Not on file    Drug use: Not on file    Sexual activity: Not on file   Other Topics Concern    Not on file   Social History Narrative    Not on file     Social Determinants of Health     Financial Resource Strain: High Risk (1/1/2022)    [facility]    Financial Resource Strain     Difficulty of Paying Living Expenses: Not on file     Difficulty of Paying Living Expenses: Not on file   Food Insecurity: Not on file   Transportation Needs: Not on file   Physical Activity: Not on file   Stress: Not on file   Social Connections: Unknown (1/1/2022)    [facility], [facility]    Social Connections     Frequency of Communication with Friends and Family: Not on file   Interpersonal Safety: Not on file   Housing Stability: Not on file         FAMILY HISTORY: No family history on file.   Non-contributory for problems with anesthesia    REVIEW OF SYSTEMS:   The patient was asked a 14 point review  of systems regarding constitutional symptoms, eye symptoms, ears, nose, mouth, throat symptoms, cardiovascular symptoms, respiratory symptoms, gastrointestinal symptoms, genitourinary symptoms, musculoskeletal symptoms, integumentary symptoms, neurological symptoms, psychiatric symptoms, endocrine symptoms, hematologic/lymphatic symptoms, and allergic/ immunologic symptoms.   The pertinent factors have been included in the HPI and below.  Patient Supplied Answers to Review of Systems      7/9/2024     8:08 AM   [facility] ENT ROS   Constitutional Weight gain       Physical Examination   The patient underwent a physical examination as described below. The pertinent positive and negative findings are summarized after the description of the examination.  Constitutional: The patient's developmental and nutritional status was assessed. The patient's voice quality was assessed.  Head and Face: The head and face were inspected for deformities. Facial muscle strength was assessed bilaterally.  Eyes: Extraocular movements and primary gaze alignment were assessed.  Ears, Nose, Mouth and Throat: The ears and nose were examined for deformities.The lips were examined for abnormalities.   Neck: The neck was visualized for abnormal neck masses  Respiratory: The nature of the breathing was observed.  Extremities: The hand extremities were examined for any clubbing or cyanosis.  Skin: The skin was examined for inflammatory or neoplastic conditions.  Neurologic: The patient's orientation, mood, and affect were noted. The cranial nerve  functions were examined.  Other pertinent positive and negative findings on physical examination:   Breathing comfortably on room air, no stridor with deep inspiration  *** throat clearing throughout the visit     All other physical examination findings were within normal limits and noncontributory  BEHAVIORAL & QUALITATIVE EVALUATION OF VOICE AND RESONENCE   Comments: MPT: ***  Vocal Quality: ***    Pitch  "Range:  {Rgnormaltoseverelyreduced:386020::\"Normal\"}   Phrase Length:  {RGnormaltoseverelyreduced:051007::\"Normal\"}  Vocal Loudness: {RGnormaltoseverelyreduced:858079::\"Normal\"}  Dysarthria: {YES-NO  Default Yes:4444}    Review of Relevant Clinical Data   I personally reviewed:  Notes:   Ora Shepard, SLP 7/12/24  Since Derrick's last session, he reports the following:   He feels like the area around his larynx has been closed up and constricted.   He's vomited 10 times since his last session, almost an 11th time while waiting in the waiting room today next to someone wearing perfume. Triggers have included food smells, talking, wet dog, rain, humidity, a/c in car, hot air, chilly air, smoke from fireworks, etc.   He expressed significant frustration with the severity of impact on his QOL which the chronic coughing continues to have, stating he can barely leave the house without fear of triggers and possibly vomiting in public.     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 SLP staff have noticed objective evidence for the same - continued training with SLP at [facility] is key to his recovery. Botox injections are planned, and hopefully they will help change the course of his long illness. I have at multiple times offered to help him seek another opinion at [facility] -- he again declines this offer today. He's been away from all dust exposure for many months. Continuing the work restriction for now is still necessary.   The chest CT and bronchoscopy in December 2022 did not reveal parenchymal lung disease or lower airway disease. "   We still recommend he use the ICS/LABA and tiotropium. He may use albuterol PRN. (Formulary changes mandated changing ICS/LABA product, and the albuterol from HHI to nebulizer (he declined nebulizer). Continue BID PPI.   See work restriction below. We will follow up by December 2024, when I will update the work restrictions.   Routine environmental allergy testing was negative earlier. No sign of eosinophilia. 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 He is already on appropriate treatment, currently unable to do PFTs: any forced expiration would likely be marked by severe cough artifact and thus be uninterpretable. Once symptoms come under much better control, we hope to check PFTs and methacholine challenge, to confirm obstruction and thus RADS as the diagnosis. We've seen some progress in suppression/control of cough but it's been very difficult for him to employ during a coughing spell; staff at [facility] have coached him and witnessed him effectively suppressing/aborting coughing spells, which gives us hope and reason to continue to work with [facility] as much as possible.   I have encouraged him in the past to continue to try to exercise. He's been limited due to the severity of the cough and dyspnea, and it's not realistic to expect that he could exercise under these circumstances.  He has insomnia and emotional disturbances due to the pervasive, life-altering refractory cough and the treatment from various elements of the support (including the recent vocational evaluation). I again recommended he consider seeing a mental health professional.   Laryngeal hyperreactivity/supraglottic hyperfunction:  He has seen [facility], who recommended a series of treatments - these are proving  "effective, but he still needs more training with them; plans for Botox may also help. There is hope for a more comfortable existence that is not dominated by refractory cough but we are still, frustratingly, very far from that time.   Remote history of minimal tobacco use but no ongoing exposure or vaping. No sign of underlying preexisting respiratory disease. he has had a long history of a high level of exercise tolerance (2 hours routinely in the past).   Workability -- he is still limited by cough and dyspnea but there are sedentary jobs he can do. He and the [facility] are working to clarify and eventually seek employment - so far, no prospects have panned out. See the work restriction letters below (two restriction letters exist: one for [employer], and one for general use).   Ascending aorta dilation -- I previously informed him of this finding and advised him to see his primary care clinic to determine the next steps. It is not likely related to the cough.   Tiny lung nodules -- in a nonsmoker, these are not of consequence and do not need follow up.     Follow up: by the end of December 2024     Labs:  No results found for: \"TSH\"  No results found for: \"NA\", \"CO2\", \"BUN\", \"CREAT\", \"GLUCOSE\", \"PHOS\"  No results found for: \"WBC\", \"HGB\", \"HCT\", \"MCV\", \"PLT\"  No results found for: \"PT\", \"PTT\", \"INR\"  No results found for: \"MAREN\"  No components found for: \"RHEUMATOIDFACTOR\", \"RF\"  No results found for: \"CRP\"  No components found for: \"CKTOT\", \"URICACID\"  No components found for: \"C3\", \"C4\", \"DSDNAAB\", \"NDNAABIFA\"  No results found for: \"MPOAB\"    Patient reported Quality of Life (QOL) Measures   Patient Supplied Answers To VHI Questionnaire       No data to display                  Patient Supplied Answers To EAT Questionnaire       No data to display                  Patient Supplied Answers To CSI Questionnaire      8/29/2024    10:48 AM   Cough Severity Index (CSI)   My cough is worse when I lie down 3   My coughing " problem causes me to restrict my personal and social life 4   I tend to avoid places because of my cough problem 3   I feel embarrassed because of my coughing problem 4   People ask, ''What's wrong?'' because I cough a lot 4   I run out of air when I cough 4   My coughing problem affects my voice 3   My coughing problem limits my physical activity 4   My coughing problem upsets me 4   People ask me if I am sick because I cough a lot 4   CSI Score 37         Patient Supplied Answers to Dyspnea Index Questionnaire:       No data to display               Scribe Disclosure:   I, Jessica English, am serving as a scribe; to document services personally performed by Kathy De Paz MD -based on data collection and the provider's statements to me.     Provider Disclosure:  I agree with above History, Review of Systems, Physical exam and Plan.  I have reviewed the content of the documentation and have edited it as needed. I have personally performed the services documented here and the documentation accurately represents those services and the decisions I have made.      Kathy De Paz MD    Laryngology    [facility]  Department of  Otolaryngology - Head and Neck Surgery  Clinics & Surgery Center  [address] [place] [address]  Appointment line: [phone]  Fax: [phone]    *** minutes spent on the date of the encounter doing { E&M time in:643270}         [facility]   at the [facility]   Otolaryngology Clinic     Patient: Aris Colon    MRN: [id number]    : 1970    Age/Gender: 54 year old male  Date of Service: 2024  Rendering Provider:   Kathy De Paz MD     Chief Complaint   Cough  R SLN block 24      Impression & Plan     IMPRESSION: Mr. 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 A few subpleural nodules in the right middle lobe of the lung measuring up to 3 mm are unchanged as is a 3 mm left lower lobe nodule. These are probably benign.  Ascending thoracic aortic aneurysm measuring 46 mm unchanged. Patient subglottis  - no ACEI   - allergy triggers and work up: none  - pulmonary triggers and work up: working with pulm, had chest CT and bronchoscopy, has inhalers and tessalon perles  - GI triggers and work up: wakes up coughing at night, denies feeling reflux, vomits from coughing  - ENT triggers and work up: associated with fragrances, heat, humidity, and moisture  - scope shows postcricoid edema  - symptoms likely due to irritable larynx syndrome and laryngopharyngeal reflux, does have coughing at night and snoring so need to rule out sleep apnea  - symptoms today 7/9/24 not better, though he has been working with [employer], can't live his life, constantly coughing and vomiting, he is very distraught, SLN block did not work, made things worse  - reviewed SLP note 6/13/24 and [facility] note 6/11/24, pulm note 11/13/23 - on inhaler, reviewed Xray Video Swallow Exam at swallow rounds 9/13/23 - normal  - discussed starting neuromodulation vs botox   - will start with neuromodulation, reports trying gabapentin before so will start with lyrica instead, will also put in the request for botox   - symptoms 9/5/2024 are worsened given the current upheavel with his botox claim, had to go to court  - does not have side effects from lyrica, discussed maximizing the dosing by increasing to 2 pills in the evening and continuing with voice therapy    Plan  - voice therapy  - continue    Plan  ***         RETURN VISIT: ***      Interval History   HISTORY OF PRESENT  ILLNESS: Mr. Colon is a 54 year old male is being followed for cough.   he was initially seen on 7/9/24. Please refer to this note for full history.   Of note ***    Today, he presents ***. he reports      ***    PAST MEDICAL HISTORY: No past medical history on file.    PAST SURGICAL HISTORY: No past surgical history on file.    CURRENT MEDICATIONS:   Current Outpatient Medications:      albuterol (PROAIR HFA/PROVENTIL HFA/VENTOLIN HFA) 108 (90 Base) MCG/ACT inhaler, 2 puffs every 4 hours as needed, Disp: , Rfl:      benzonatate (TESSALON) 100 MG capsule, Take 1 capsule (100 mg) by mouth 3 times daily as needed for cough, Disp: 30 capsule, Rfl: 4     FLUoxetine (PROZAC) 20 MG capsule, , Disp: , Rfl:      fluticasone-vilanterol (BREO ELLIPTA) 100-25 MCG/ACT inhaler, , Disp: , Rfl:      hydrOXYzine (ATARAX) 10 MG tablet, , Disp: , Rfl:      LANsoprazole (PREVACID) 30 MG DR capsule, Take 30 mg by mouth, Disp: , Rfl:      omeprazole (PRILOSEC) 20 MG DR capsule, TAKE 2 CAPSULES(40 MG) BY MOUTH TWICE DAILY, Disp: , Rfl:      pantoprazole (PROTONIX) 40 MG EC tablet, Take 1 tablet (40 mg) by mouth 2 times daily, Disp: 180 tablet, Rfl: 2     pregabalin (LYRICA) 75 MG capsule, Take 1 capsule (75 mg) by mouth daily for 2 days, THEN 1 capsule (75 mg) 2 times daily for 2 days, THEN 1 capsule (75 mg) 3 times daily for 42 days., Disp: 132 capsule, Rfl: 0     sildenafil (REVATIO) 20 MG tablet, TAKE 1-5 TABLETS BY MOUTH 30 MINS TO 4 HOURS BEFORE SEXUAL ACTIVITY, Disp: , Rfl:      tiotropium (SPIRIVA RESPIMAT) 2.5 MCG/ACT inhaler, Inhale 2 puffs into the lungs, Disp: , Rfl:     Current Facility-Administered Medications:      botulinum toxin type A (BOTOX) 100 units injection 6 Units, 6 Units, Intramuscular, Q90 Days, Kathy De Paz MD    ALLERGIES: Patient has no known allergies.    SOCIAL HISTORY:    Social History     Socioeconomic History     Marital status:      Spouse name: Not on file     Number of children: Not on  file     Years of education: Not on file     Highest education level: Not on file   Occupational History     Not on file   Tobacco Use     Smoking status: Never     Passive exposure: Never     Smokeless tobacco: Never   Vaping Use     Vaping status: Never Used   Substance and Sexual Activity     Alcohol use: Not on file     Drug use: Not on file     Sexual activity: Not on file   Other Topics Concern     Not on file   Social History Narrative     Not on file     Social Determinants of Health     Financial Resource Strain: High Risk (1/1/2022)    [email or web address]    Financial Resource Strain      Difficulty of Paying Living Expenses: Not on file      Difficulty of Paying Living Expenses: Not on file   Food Insecurity: Not on file   Transportation Needs: Not on file   Physical Activity: Not on file   Stress: Not on file   Social Connections: Unknown (1/1/2022)    [facility]    Social Connections      Frequency of Communication with Friends and Family: Not on file   Interpersonal Safety: Not on file   Housing Stability: Not on file         FAMILY HISTORY: No family history on file.   Non-contributory for problems with anesthesia    REVIEW OF SYSTEMS:   The patient was asked a 14 point review of systems regarding constitutional symptoms, eye symptoms, ears, nose, mouth, throat symptoms, cardiovascular symptoms, respiratory symptoms, gastrointestinal symptoms, genitourinary symptoms, musculoskeletal symptoms, integumentary symptoms, neurological symptoms, psychiatric symptoms, endocrine symptoms, hematologic/lymphatic symptoms, and allergic/ immunologic symptoms.   The pertinent factors have been included in the HPI and below.  Patient Supplied Answers to Review of Systems      7/9/2024     8:08 AM   [facility] ENT ROS   Constitutional Weight gain  "      Physical Examination   The patient underwent a physical examination as described below. The pertinent positive and negative findings are summarized after the description of the examination.  Constitutional: The patient's developmental and nutritional status was assessed. The patient's voice quality was assessed.  Head and Face: The head and face were inspected for deformities. Facial muscle strength was assessed bilaterally.  Eyes: Extraocular movements and primary gaze alignment were assessed.  Ears, Nose, Mouth and Throat: The ears and nose were examined for deformities.The lips were examined for abnormalities.   Neck: The neck was visualized for abnormal neck masses  Respiratory: The nature of the breathing was observed.  Extremities: The hand extremities were examined for any clubbing or cyanosis.  Skin: The skin was examined for inflammatory or neoplastic conditions.  Neurologic: The patient's orientation, mood, and affect were noted. The cranial nerve  functions were examined.  Other pertinent positive and negative findings on physical examination:   Breathing comfortably on room air, no stridor with deep inspiration  *** throat clearing throughout the visit     All other physical examination findings were within normal limits and noncontributory  BEHAVIORAL & QUALITATIVE EVALUATION OF VOICE AND RESONENCE   Comments: MPT: ***  Vocal Quality: ***    Pitch Range:  {Rgnormaltoseverelyreduced:598004::\"Normal\"}   Phrase Length:  {RGnormaltoseverelyreduced:503995::\"Normal\"}  Vocal Loudness: {RGnormaltoseverelyreduced:381203::\"Normal\"}  Dysarthria: {YES-NO  Default Yes:4444}    Review of Relevant Clinical Data   I personally reviewed:  Notes:   Ora Shepard, SLP 7/12/24  Since Derrick's last session, he reports the following:   He feels like the area around his larynx has been closed up and constricted.   He's vomited 10 times since his last session, almost an 11th time while waiting in the waiting room today next " to someone wearing perfume. Triggers have included food smells, talking, wet dog, rain, humidity, a/c in car, hot air, chilly air, smoke from fireworks, etc.   He expressed significant frustration with the severity of impact on his QOL which the chronic coughing continues to have, stating he can barely leave the house without fear of triggers and possibly vomiting in public.     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 SLP staff have noticed objective evidence for the same - continued training with SLP at [facility] is key to his recovery. Botox injections are planned, and hopefully they will help change the course of his long illness. I have at multiple times offered to help him seek another opinion at [facility] -- he again declines this offer today. He's been away from all dust exposure for many months. Continuing the work restriction for now is still necessary.   The chest CT and bronchoscopy in December 2022 did not reveal parenchymal lung disease or lower airway disease.   We still recommend he use the ICS/LABA and tiotropium. He may use albuterol PRN. (Formulary changes mandated changing ICS/LABA product, and the albuterol from HHI to nebulizer (he declined nebulizer). Continue BID PPI.   See work restriction below. We will follow up by December 2024, when I will update the work restrictions.   Routine environmental allergy testing was negative earlier. No sign of eosinophilia. 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 He is already on appropriate treatment, currently unable to do PFTs: any forced expiration would likely be marked by severe cough artifact and thus be uninterpretable. Once symptoms come under much better control, we hope to check PFTs and methacholine challenge, to confirm obstruction and thus RADS as the diagnosis. We've seen some progress in suppression/control of cough but it's been very difficult for him to employ during a coughing spell; staff at [facility] have coached him and witnessed him effectively suppressing/aborting coughing spells, which gives us hope and reason to continue to work with [facility] as much as possible.   I have encouraged him in the past to continue to try to exercise. He's been limited due to the severity of the cough and dyspnea, and it's not realistic to expect that he could exercise under these circumstances.  He has insomnia and emotional disturbances due to the pervasive, life-altering refractory cough and the treatment from various elements of the support (including the recent vocational evaluation). I again recommended he consider seeing a mental health professional.   Laryngeal hyperreactivity/supraglottic hyperfunction:  He has seen [facility], who recommended a series of treatments - these are proving effective, but he still needs more training with them; plans for Botox may also help. There is hope for a more comfortable existence that is not dominated by refractory cough but we are still, frustratingly, very far from that time.   Remote history of minimal tobacco use but no ongoing exposure or vaping. No sign of underlying preexisting respiratory disease. he has had a long history of a high level of exercise tolerance (2 hours routinely in the past).   Workability -- he is still limited by cough and dyspnea but there are sedentary jobs he can do.  "He and the [facility] are working to clarify and eventually seek employment - so far, no prospects have panned out. See the work restriction letters below (two restriction letters exist: one for [employer], and one for general use).   Ascending aorta dilation -- I previously informed him of this finding and advised him to see his primary care clinic to determine the next steps. It is not likely related to the cough.   Tiny lung nodules -- in a nonsmoker, these are not of consequence and do not need follow up.     Follow up: by the end of December 2024     Labs:  No results found for: \"TSH\"  No results found for: \"NA\", \"CO2\", \"BUN\", \"CREAT\", \"GLUCOSE\", \"PHOS\"  No results found for: \"WBC\", \"HGB\", \"HCT\", \"MCV\", \"PLT\"  No results found for: \"PT\", \"PTT\", \"INR\"  No results found for: \"MAREN\"  No components found for: \"RHEUMATOIDFACTOR\", \"RF\"  No results found for: \"CRP\"  No components found for: \"CKTOT\", \"URICACID\"  No components found for: \"C3\", \"C4\", \"DSDNAAB\", \"NDNAABIFA\"  No results found for: \"MPOAB\"    Patient reported Quality of Life (QOL) Measures   Patient Supplied Answers To VHI Questionnaire       No data to display                  Patient Supplied Answers To EAT Questionnaire       No data to display                  Patient Supplied Answers To CSI Questionnaire      8/29/2024    10:48 AM   Cough Severity Index (CSI)   My cough is worse when I lie down 3   My coughing problem causes me to restrict my personal and social life 4   I tend to avoid places because of my cough problem 3   I feel embarrassed because of my coughing problem 4   People ask, ''What's wrong?'' because I cough a lot 4   I run out of air when I cough 4   My coughing problem affects my voice 3   My coughing problem limits my physical activity 4   My coughing problem upsets me 4   People ask me if I am sick because I cough a lot 4   CSI Score 37         Patient Supplied Answers to Dyspnea Index Questionnaire:       No data to display             "   Scribe Disclosure:   I, Jessica English, am serving as a scribe; to document services personally performed by Kathy De Paz MD -based on data collection and the provider's statements to me.     Provider Disclosure:  I agree with above History, Review of Systems, Physical exam and Plan.  I have reviewed the content of the documentation and have edited it as needed. I have personally performed the services documented here and the documentation accurately represents those services and the decisions I have made.      Kathy De Paz MD    Laryngology    [facility]  Department of  Otolaryngology - Head and Neck Surgery  Clinics & [facility] Center  [address] [place] [address]  Appointment line: [phone]  Fax: [phone]    *** minutes spent on the date of the encounter doing {2021 E&M time in:777638}       Again, thank you for allowing me to participate in the care of your patient.      Sincerely,    Kathy De Paz MD

## 2024-09-10 ENCOUNTER — VIRTUAL VISIT (OUTPATIENT)
Dept: OTOLARYNGOLOGY | Facility: CLINIC | Age: 54
End: 2024-09-10
Payer: OTHER MISCELLANEOUS

## 2024-09-10 DIAGNOSIS — R05.3 CHRONIC COUGH: Primary | ICD-10-CM

## 2024-09-10 PROCEDURE — 92507 TX SP LANG VOICE COMM INDIV: CPT | Mod: GN | Performed by: SPEECH-LANGUAGE PATHOLOGIST

## 2024-09-10 NOTE — PATIENT INSTRUCTIONS
"Hello!     It was a pleasure to see you today. Please complete the exercises below and remember, a few minutes of practice many times throughout the day is more important than one large practice session. If you have any questions about your strategies, feel free to contact me at [email or web address] or via [employer]. Please call [phone] for scheduling alterations or to be seen sooner in case of cancellations.      Home Exercise Program:  When you start to cough:   SIPPY BREATH IN & BALLOON CHEEKS OUT, SIP & BALLOON, SIP & BALLOON...  Chest up and shoulders back, belly crunch       Seasonal/Dryness Strategies:  Around the fall season, there can be more dryness in the air and people start being more prone to catching colds. Some strategies to help keep yourself feeling better are:  1. Gargling a little bit of warm water mixed with salt and baking soda can be tremendously helpful. It's about 2 oz warm water mixed with 1/4-1/2 tsp mixture (half salt/half baking soda). Plan to do this every morning and evening before brushing your teeth. If you find it helpful, you can gargle and spit in the sink multiple times a day.   2. If you feel mucusy in your throat (or if you can't spit in the sink) then gargle plain water and swallow it.   3. A humidifier can be really helpful in the fall/winter, but you can just use a kitchen bowl filled with hot, steamy water on the table next to you or your nightstand.   4. You can wring out a washcloth with hot or cold water, holding it over your nose and mouth for a few minutes while breathing through it.         BUZZY VOICE- ALL .6Hz or C#3  Practice throughout the day, whenever you get a chance  1. 10x \"hmmm\" for 5-7 seconds   Focus on \"nasal\" or \"twangy\" vibrations in your lips/nose on \"mmm\"  Belly muscles crunches upward and inward  2. Hmm...moomoomoo, moemoemoe, mememe, mamama, mymymy, maymaymay, mehmehmeh  Focus on \"nasal\" or \"twangy\" vibrations in your lips/nose on " "\"mmm\"  Belly muscles crunches upward and inward  3. Hmm...moon, mom, mean, man, mine, moan, men, main, morning, meaning, manners, mammals  You can start doing 2-3 words together   Focus on \"nasal\" or \"twangy\" vibrations in your lips/nose on \"mmm\"  Belly muscles crunches upward and inward  Chest stays tall  4. Sentences: Still chanted on 138.59Hz   Hmm...Mother knits many mittens.   Nobody knows Alejandra s name.              Magaly s measles made Magaly miserable.   Milking machines make much noise.    Mild-mannered Kylie  sunday Rodrigues.   Kelvin marched many, many miles.       Millions made money in the market in March.   My mother munches melon on Mondays.  Maybe my memory needs mending.   Genesis made more money.   Magaly made many muffins.   Merlin motored many miles.   Kelvin makes major messes.   My momma made meatloaf.   Music makes mellow moods.  Hmmy favorite____ is ____; Nnnni like _____  HmmI'm going_____; HmmI need to_____. Starting with the buzzy sound each phrase.      Chanted on 1 pitch (138.59Hz or C#3) for now, like a monk or robot.   Focus on \"nasal\" or \"twangy\" vibrations in your lips/nose on \"mmm\"  Belly muscles crunches upward and inward     Thank you and have a great day!  Rosendo Marmolejo. (voice), M.S., CCC-SLP  Speech-Language Pathologist  [facility]  [phone]  [email or web address]  Pronouns: she/her/hers  "

## 2024-09-10 NOTE — LETTER
9/10/2024       RE: Aris Colon  [address] [place] [id number]     Dear Colleague,    Thank you for referring your patient, Aris Colon, to the [facility]ICE [facility] [place] at [facility]. Please see a copy of my visit note below.    Derrick Colon is a 54 year old male who is being evaluated via a billable video visit.      Derrick has been notified and verbally consented to the following:   This video visit will be conducted between you and your provider.  Patient has opted to conduct today's video visit vs an in-person appointment.   Video visits are billed at different rates depending on your insurance coverage. Please reach out to your insurance provider with any questions.   If during the course of the call the provider feels the appointment is not appropriate, you will not be charged for this service.  Provider has received verbal consent for billable virtual visit from the patient? Yes  Will anyone else be joining your video visit? No    Call initiated at: 1000   Type of Visit Platform Used: [employer] Video  Location of provider: Home  Location of patient: [facility]ICE CLINIC  PROGRESS REPORT (CPT 20441)    Patient: Aris Colon  Date of Service: 9/10/2024  Date of Last Service: 8/29/24  Number of Visits: 16/18 approved (had requested 10 more, was denied)  Referring Physician: Dr. Kathy De Paz    Impressions from evaluation on 11/17/23 by Mark Dutta M.S., CCC-SLP:  Derrick Colon presents today with ongoing chronic cough which began in conjunction with workplace particle exposure, and with recent exacerbation including hemoptysis and emesis.  Laryngeal evaluation today was warranted given recent episodes of hemoptysis to ensure the patient was not recovering from hemorrhagic or ulcerative event.  This demonstrated no acute trauma which is fortunate, though diffuse change to the vocal  folds and surrounding tissues from longstanding cough was seen consistent with previous evaluation if slightly worsened.  Cough response was aggressive to the application of oxymetazoline nasal spray, and guarding and light cough was noted to occur even prior to the administration, which underscores both the hypersensitivity as well as the stress base response to triggers that has developed over time.  Within the exam itself patient was able to attenuate cough through focused relaxation which bodes well for his ability to incorporate therapeutic strategies to reduce cough, though as we discussed full resolution of symptoms is often challenging due to their multifactorial nature especially in the case of irritant exposure.      SUBJECTIVE:  Since Derrick's last session, he reports the following:   He went to court on 9/4/24 and will be waiting to see if the  determines his cough is work-related or not, and in the meantime all additional medical services are on hold/denied.   He thinks his cough has been a little bit better the past two weeks.   He's been able to try controlling the cough better with his strategies, but has also still been avoiding social activities/leaving his house to stay away from triggers.   Dr. De Paz increased his Lyrica and recommended ongoing speech pathology.   His eyes have been watering a little more and he thinks he has a little bit of a cold.     OBJECTIVE:  Patient Specific Goal Metrics:      4/4/2024    12:00 PM 8/29/2024    11:00 AM 9/10/2024    10:00 AM   Dysponia SLP Goals   How severe is your cough /throat clearing, if 0 is no cough at all and 10 is the worst cough? 8 7 7   How much does your cough/throat-clearing problem bother you?            Very Much Quite a bit Quite a bit     Spontaneous FO: 114Hz >> Shifted to 138Hz by end of session with strategies.    THERAPEUTIC ACTIVITIES  Today Derrick participated in the following therapeutic activities:  Counseling and  "Education:  Asked questions about the nature of his symptoms, and I answered all of these thoroughly.    I provided Derrick with explanation and skilled instruction of:  Exercises to coordinate phonation with optimal flowing airstream and reduce phonatory impact.   Semi-occluded vocal tract exercises transitioning into M initial speech tasks were most facilitating  He progressed through the carrier phrase level of phonatory complexity  He moved much more quickly through the hum, syllable, and word levels with greater accy and independence.   He progressed to sets of 2-3 words per breath  He demonstrated improving ability to self-monitor and identify target voice production versus habitual voice production, benefiting from use of negative practice to develop somatosensory awareness  His initial spontaneous fundamental frequency was 110-114Hz, though he acknowledged he does have a bit of a cold. This improved to spontaneous fundamental frequency of target 138Hz by the end of today's session.   Of note, he demonstrated much more single coughs and throat clears while initially speaking at 110-114Hz with significantly reduced frequency once voice tasks were implemented.   He only cleared his throat x6 during the 35 minutes of voice tasks, as opposed to 14 coughs and 4 throat clears in the first 20 minutes.   There was not a single cough or throat clear throughout the hum and syllable levels, with only 1 throat clear during the word level.   He had a single episode of initial coughing \"fit\" during carrier phrase tasks, which he rapidly mitigated through use of the sip & balloon strategy.   He laughed x2 during the session and did not trigger a coughing fit, as previously observed.  Patient demonstrated good learning, but will need practice and additional reinforcement    Instruction of Home Practice:  A revised regimen for home practice was instructed.  I provided an AVS of important notes of today's therapeutic activities " to facilitate practice.    ASSESSMENT/PLAN  Progress toward long-term goals:  Adequate but incomplete progress; please see above    Impressions:  IMPRESSIONS: chronic cough   Derrick had a productive session of therapy today, working on progression of resonant voice tasks. He is very clearly continuing to demonstrate improvement in symptoms through use of therapeutic strategies.  He will continue to work on his exercises on a daily basis, and work on incorporating the techniques into his daily activities. Speech therapy continues to be medically necessary for Derrick to participate fully and engage in activities of daily living.     Plan:   I will see Derrick in 5 weeks and will plan to continue targeting abdominal respiratory pattern with resonant voice tasks, progressing from M/N initial spontaneous phrase level as able. Progress to completely spontaneous speech as able. Possibly begin transitioning to more speech-like inflection patterns. Continue reinforcing abdominal pattern and improved posture with sip & balloon to mitigate coughing fits.     For home practice goals, see AVS.     TOTAL SERVICE TIME: 60 minutes  TREATMENT (12154)    Mliagros Landa (voice) MNikolaiS., CCC-SLP  Speech-Language Pathologist  [facility]  [phone]  [email or web address]  Pronouns: she/her/hers      *this report was created in part through the use of computerized dictation software, and though reviewed following completion, some typographic errors may persist.  If there is confusion regarding any of this notes contents, please contact me for clarification    OBJECTIVE FINDINGS  PATIENT REPORTED MEASURES  Patient Supplied Answers To Last 2 VHI Questionnaires       No data to display                   Patient Supplied Answers To Last 2 CSI Questionnaires      8/29/2024    10:48 AM 9/10/2024     9:47 AM   Cough Severity Index (CSI)   My cough is worse when I lie down 3 3   My coughing problem causes me to restrict my  personal and social life 4 3   I tend to avoid places because of my cough problem 3 3   I feel embarrassed because of my coughing problem 4 3   People ask, ''What's wrong?'' because I cough a lot 4 4   I run out of air when I cough 4 3   My coughing problem affects my voice 3 3   My coughing problem limits my physical activity 4 3   My coughing problem upsets me 4 3   People ask me if I am sick because I cough a lot 4 4   CSI Score 37 32          Patient Supplied Answers To Last 2 EAT Questionnaires       No data to display                  Patient Supplied Answers to Dyspnea Index Questionnaire:       No data to display                   Again, thank you for allowing me to participate in the care of your patient.      Sincerely,    Ora Shepard, SLP

## 2024-09-10 NOTE — PROGRESS NOTES
Derrick Colon is a 54 year old male who is being evaluated via a billable video visit.      Derrick has been notified and verbally consented to the following:   This video visit will be conducted between you and your provider.  Patient has opted to conduct today's video visit vs an in-person appointment.   Video visits are billed at different rates depending on your insurance coverage. Please reach out to your insurance provider with any questions.   If during the course of the call the provider feels the appointment is not appropriate, you will not be charged for this service.  Provider has received verbal consent for billable virtual visit from the patient? Yes  Will anyone else be joining your video visit? No    Call initiated at: 1000   Type of Visit Platform Used: Plurality Video  Location of provider: Home  Location of patient: [facility] VOICE CLINIC  PROGRESS REPORT (CPT 81924)    Patient: Aris Colon  Date of Service: 9/10/2024  Date of Last Service: 8/29/24  Number of Visits: 16/18 approved (had requested 10 more, was denied)  Referring Physician: Dr. Kathy De Paz    Impressions from evaluation on 11/17/23 by Mark Dutta M.S., CCC-SLP:  Derrick Colon presents today with ongoing chronic cough which began in conjunction with workplace particle exposure, and with recent exacerbation including hemoptysis and emesis.  Laryngeal evaluation today was warranted given recent episodes of hemoptysis to ensure the patient was not recovering from hemorrhagic or ulcerative event.  This demonstrated no acute trauma which is fortunate, though diffuse change to the vocal folds and surrounding tissues from longstanding cough was seen consistent with previous evaluation if slightly worsened.  Cough response was aggressive to the application of oxymetazoline nasal spray, and guarding and light cough was noted to occur even prior to the administration, which underscores both the hypersensitivity as well  as the stress base response to triggers that has developed over time.  Within the exam itself patient was able to attenuate cough through focused relaxation which bodes well for his ability to incorporate therapeutic strategies to reduce cough, though as we discussed full resolution of symptoms is often challenging due to their multifactorial nature especially in the case of irritant exposure.      SUBJECTIVE:  Since Derrick's last session, he reports the following:   He went to court on 9/4/24 and will be waiting to see if the  determines his cough is work-related or not, and in the meantime all additional medical services are on hold/denied.   He thinks his cough has been a little bit better the past two weeks.   He's been able to try controlling the cough better with his strategies, but has also still been avoiding social activities/leaving his house to stay away from triggers.   Dr. De Paz increased his Lyrica and recommended ongoing speech pathology.   His eyes have been watering a little more and he thinks he has a little bit of a cold.     OBJECTIVE:  Patient Specific Goal Metrics:      4/4/2024    12:00 PM 8/29/2024    11:00 AM 9/10/2024    10:00 AM   Dysponia SLP Goals   How severe is your cough /throat clearing, if 0 is no cough at all and 10 is the worst cough? 8 7 7   How much does your cough/throat-clearing problem bother you?            Very Much Quite a bit Quite a bit     Spontaneous FO: 114Hz >> Shifted to 138Hz by end of session with strategies.    THERAPEUTIC ACTIVITIES  Today Derrick participated in the following therapeutic activities:  Counseling and Education:  Asked questions about the nature of his symptoms, and I answered all of these thoroughly.    I provided Derrick with explanation and skilled instruction of:  Exercises to coordinate phonation with optimal flowing airstream and reduce phonatory impact.   Semi-occluded vocal tract exercises transitioning into M initial speech tasks were most  "facilitating  He progressed through the carrier phrase level of phonatory complexity  He moved much more quickly through the hum, syllable, and word levels with greater accy and independence.   He progressed to sets of 2-3 words per breath  He demonstrated improving ability to self-monitor and identify target voice production versus habitual voice production, benefiting from use of negative practice to develop somatosensory awareness  His initial spontaneous fundamental frequency was 110-114Hz, though he acknowledged he does have a bit of a cold. This improved to spontaneous fundamental frequency of target 138Hz by the end of today's session.   Of note, he demonstrated much more single coughs and throat clears while initially speaking at 110-114Hz with significantly reduced frequency once voice tasks were implemented.   He only cleared his throat x6 during the 35 minutes of voice tasks, as opposed to 14 coughs and 4 throat clears in the first 20 minutes.   There was not a single cough or throat clear throughout the hum and syllable levels, with only 1 throat clear during the word level.   He had a single episode of initial coughing \"fit\" during carrier phrase tasks, which he rapidly mitigated through use of the sip & balloon strategy.   He laughed x2 during the session and did not trigger a coughing fit, as previously observed.  Patient demonstrated good learning, but will need practice and additional reinforcement    Instruction of Home Practice:  A revised regimen for home practice was instructed.  I provided an AVS of important notes of today's therapeutic activities to facilitate practice.    ASSESSMENT/PLAN  Progress toward long-term goals:  Adequate but incomplete progress; please see above    Impressions:  IMPRESSIONS: chronic cough   Derrick had a productive session of therapy today, working on progression of resonant voice tasks. He is very clearly continuing to demonstrate improvement in symptoms through use " of therapeutic strategies.  He will continue to work on his exercises on a daily basis, and work on incorporating the techniques into his daily activities. Speech therapy continues to be medically necessary for Derrick to participate fully and engage in activities of daily living.     Plan:   I will see Derrick in 5 weeks and will plan to continue targeting abdominal respiratory pattern with resonant voice tasks, progressing from M/N initial spontaneous phrase level as able. Progress to completely spontaneous speech as able. Possibly begin transitioning to more speech-like inflection patterns. Continue reinforcing abdominal pattern and improved posture with sip & balloon to mitigate coughing fits.     For home practice goals, see AVS.     TOTAL SERVICE TIME: 60 minutes  TREATMENT (64107)    Milagros Landa (voice), M.S., CCC-SLP  Speech-Language Pathologist  [facility]  [phone]  [email or web address]  Pronouns: she/her/hers      *this report was created in part through the use of computerized dictation software, and though reviewed following completion, some typographic errors may persist.  If there is confusion regarding any of this notes contents, please contact me for clarification    OBJECTIVE FINDINGS  PATIENT REPORTED MEASURES  Patient Supplied Answers To Last 2 VHI Questionnaires       No data to display                   Patient Supplied Answers To Last 2 CSI Questionnaires      8/29/2024    10:48 AM 9/10/2024     9:47 AM   Cough Severity Index (CSI)   My cough is worse when I lie down 3 3   My coughing problem causes me to restrict my personal and social life 4 3   I tend to avoid places because of my cough problem 3 3   I feel embarrassed because of my coughing problem 4 3   People ask, ''What's wrong?'' because I cough a lot 4 4   I run out of air when I cough 4 3   My coughing problem affects my voice 3 3   My coughing problem limits my physical activity 4 3   My coughing problem  upsets me 4 3   People ask me if I am sick because I cough a lot 4 4   CSI Score 37 32          Patient Supplied Answers To Last 2 EAT Questionnaires       No data to display                  Patient Supplied Answers to Dyspnea Index Questionnaire:       No data to display

## 2024-09-13 ENCOUNTER — DOCUMENTATION ONLY (OUTPATIENT)
Dept: OTOLARYNGOLOGY | Facility: CLINIC | Age: 54
End: 2024-09-13
Payer: COMMERCIAL

## 2024-09-13 NOTE — PROGRESS NOTES
Formulary exception form for pregabalin completed and faxed to [facility] c/o [employer], for review. Fax number [phone]. 1 pg faxed.

## 2024-09-18 ENCOUNTER — MYC MEDICAL ADVICE (OUTPATIENT)
Dept: OTOLARYNGOLOGY | Facility: CLINIC | Age: 54
End: 2024-09-18
Payer: COMMERCIAL

## 2024-10-21 ENCOUNTER — VIRTUAL VISIT (OUTPATIENT)
Dept: OTOLARYNGOLOGY | Facility: CLINIC | Age: 54
End: 2024-10-21
Payer: OTHER MISCELLANEOUS

## 2024-10-21 DIAGNOSIS — R05.3 CHRONIC COUGH: Primary | ICD-10-CM

## 2024-10-21 PROCEDURE — 92507 TX SP LANG VOICE COMM INDIV: CPT | Mod: GN | Performed by: SPEECH-LANGUAGE PATHOLOGIST

## 2024-10-21 NOTE — PATIENT INSTRUCTIONS
"Hello!     It was a pleasure to see you today. Please complete the exercises below and remember, a few minutes of practice many times throughout the day is more important than one large practice session. If you have any questions about your strategies, feel free to contact me at [email or web address] or via Yabbly. Please call [phone] for scheduling alterations or to be seen sooner in case of cancellations.      Home Exercise Program:  \"Weird\" is not always bad. Sometimes it's just new or different than what you're used to. It might mean \"change in the right direction.\"     When you start to cough:   SIPPY BREATH IN & BALLOON CHEEKS OUT, SIP & BALLOON, SIP & BALLOON...  Chest up and shoulders back, belly crunch     THROAT STRETCH  - This may very likely trigger coughing.   - The point of this exercise is to slowly stretch and relax tissue that is currently really tight, like the spot along the left-side.   1. Either sit in your recliner or on your couch, or just in a chair with your back against the wall.   2. Slowly tilt your head back, lifting your chin.   3. Only go as far as feels comfortable or doesn't trigger coughing. Hold, slowly breathing in and  humming, 5x.   4. Then release. Next time, you can tilt your head back a little further.        Seasonal/Dryness Strategies:  Around the fall season, there can be more dryness in the air and people start being more prone to catching colds. Some strategies to help keep yourself feeling better are:  1. Gargling a little bit of warm water mixed with salt and baking soda can be tremendously helpful. It's about 2 oz warm water mixed with 1/4-1/2 tsp mixture (half salt/half baking soda). Plan to do this every morning and evening before brushing your teeth. If you find it helpful, you can gargle and spit in the sink multiple times a day.   2. If you feel mucusy in your throat (or if you can't spit in the sink) then gargle plain water and swallow it.   3. A " "humidifier can be really helpful in the fall/winter, but you can just use a kitchen bowl filled with hot, steamy water on the table next to you or your nightstand.   4. You can wring out a washcloth with hot or cold water, holding it over your nose and mouth for a few minutes while breathing through it.         BUZZY VOICE- ALL .6Hz or C#3  Practice throughout the day, whenever you get a chance  1. 10x \"hmmm\" for 5-7 seconds   Focus on \"nasal\" or \"twangy\" vibrations in your lips/nose on \"mmm\"  Belly muscles crunches upward and inward  2. Hmm...moomoomoo, moemoemoe, mememe, mamama, mymymy, maymaymay, mehmehmeh  Focus on \"nasal\" or \"twangy\" vibrations in your lips/nose on \"mmm\"  Belly muscles crunches upward and inward  3. Hmm...moon, mom, mean, man, mine, moan, men, main, morning, meaning, manners, mammals  You can start doing 2-3 words together   Focus on \"nasal\" or \"twangy\" vibrations in your lips/nose on \"mmm\"  Belly muscles crunches upward and inward  Chest stays tall  4. Sentences: Still chanted on 138.59Hz   Hmm...Mother knits many mittens.   Nobody knows Alejandra s name.              Magaly s measles made Magaly miserable.   Milking machines make much noise.    Mild-mannered Kylie  sunday Rodrigues.   Kelvin marched many, many miles.       Millions made money in the market in March.   My mother munches melon on Mondays.  Maybe my memory needs mending.   Genesis made more money.   Magaly made many muffins.   Merlin motored many miles.   Kelvin makes major messes.   My momma made meatloaf.   Music makes mellow moods.  Hmmy favorite____ is ____; Nnnni like _____  HmmI'm going_____; HmmI need to_____. Starting with the buzzy sound each phrase.      Chanted on 1 pitch (138.59Hz or C#3) for now, like a monk or robot.   Focus on \"nasal\" or \"twangy\" vibrations in your lips/nose on \"mmm\"  Belly muscles crunches upward and inward     ____________________________________________________    ANTI-TIGHT THROAT BREATHS   (4-5 " "breaths, several times a day)  These strategies are designed to help open and relax your throat, specifically whenever you're feeling tight or like it's hard to breathe.     SNIFF & BULLFROG: Imagine smelling something delicious, then blowing through ballooned cheeks to cool it down.   STRAW SIP & BULLFROG: Breathe in through rounded lips, like sipping air, then blow out with ballooned cheeks. You already use this to avoid or stop coughing, but it also helps loosen the throat.  TONGUE TUCK: Place the tip of your tongue against the roof of your mouth, creating a column in the center. Breathe in and feel cool & silent air sweep in along the sides of your tongue and down the back of your throat. Now keep your tongue tip in place and simply exhale.   This strategy will make your mouth dry, so stop to take a sip of water. Or, if you don't have any water nearby, you can pause to gently nibble the sides of your tongue with your molars. This tricks your brain into thinking you're eating and your mouth start to water, then move some saliva around with your tongue whenever needed, then return to your strategy.   The tongue tuck is the best strategy if you need to wear a face mask  MARK or \"Hand Breath\": Inhale with a BIG OPEN MOUTH through an ice-cream cone shaped hand. The inhale should be SILENT! Then blow out through a fist so your cheeks balloon. Go very slowly so you don't get dizzy.     Thank you and have a great day!  Rosendo Marmolejo. (voice), M.S., CCC-SLP  Speech-Language Pathologist  [facility]  [phone]  [email or web address]  Pronouns: she/her/hers  "

## 2024-10-21 NOTE — LETTER
10/21/2024       RE: Aris Colon  [address] [place] [id number]     Dear Colleague,    Thank you for referring your patient, Aris Colon, to the [facility]ICE CL[facility] [place] at [facility]. Please see a copy of my visit note below.    Derrick Colon is a 54 year old male who is being evaluated via a billable video visit.      Derrick has been notified and verbally consented to the following:   This video visit will be conducted between you and your provider.  Patient has opted to conduct today's video visit vs an in-person appointment.   Video visits are billed at different rates depending on your insurance coverage. Please reach out to your insurance provider with any questions.   If during the course of the call the provider feels the appointment is not appropriate, you will not be charged for this service.  Provider has received verbal consent for billable virtual visit from the patient? Yes  Will anyone else be joining your video visit? No    Call initiated at: 1400   Type of Visit Platform Used: Gameotic Video  Location of provider: Home  Location of patient: [facility]ICE CLINIC  PROGRESS REPORT (CPT 27674)    Patient: Aris Colon  Date of Service: 10/21/2024  Date of Last Service: 9/10/24  Number of Visits: 17/18 approved   Referring Physician: Dr. Kathy De Paz    Impressions from evaluation on 11/17/23 by Mark Dutta M.S., CCC-SLP:  Derrick Colon presents today with ongoing chronic cough which began in conjunction with workplace particle exposure, and with recent exacerbation including hemoptysis and emesis.  Laryngeal evaluation today was warranted given recent episodes of hemoptysis to ensure the patient was not recovering from hemorrhagic or ulcerative event.  This demonstrated no acute trauma which is fortunate, though diffuse change to the vocal folds and surrounding tissues  from longstanding cough was seen consistent with previous evaluation if slightly worsened.  Cough response was aggressive to the application of oxymetazoline nasal spray, and guarding and light cough was noted to occur even prior to the administration, which underscores both the hypersensitivity as well as the stress base response to triggers that has developed over time.  Within the exam itself patient was able to attenuate cough through focused relaxation which bodes well for his ability to incorporate therapeutic strategies to reduce cough, though as we discussed full resolution of symptoms is often challenging due to their multifactorial nature especially in the case of irritant exposure.      SUBJECTIVE:  Since Derrick's last session, he reports the following:   He's continued doing all of his exercises and breathing strategies  He feels like his cough had improved for a little while, but now has been picking back up with the colder weather.   The weather jumping back up to 80s yesterday and today caused more sweating, which tends to then trigger more throat tightness and difficulty breathing.   Both hot and cold air and strong scents can trigger not only coughing, but the difficulty breathing.   He thinks his voice has improved some and doesn't trigger as much coughing, but he's still frustrated when the cough flares back up.   There hasn't been any verdict yet on his case and additional medical coverage.   He's switched to a new location for pool, only on Thursday evenings, due to being able to carpool and the new place doesn't have a food menu so there are fewer smell triggers. There's also a back room where they've been able to play several times, away from the main room and potential triggers.     OBJECTIVE:  Patient Specific Goal Metrics:      4/4/2024    12:00 PM 8/29/2024    11:00 AM 9/10/2024    10:00 AM   Dysponia SLP Goals   How severe is your cough /throat clearing, if 0 is no cough at all and 10 is  the worst cough? 8 7 7   How much does your cough/throat-clearing problem bother you?            Very Much Quite a bit Quite a bit       THERAPEUTIC ACTIVITIES  Today Derrick participated in the following therapeutic activities:  Counseling and Education:  Asked questions about the nature of his symptoms, and I answered all of these thoroughly.    I provided Derrick with explanation and skilled instruction of:  Rescue breathing strategies using oral configurations to promote improved vocal fold abduction were instructed  Patient reported slow sniff inhalation and pursed lip exhalation was most beneficial  Patient was able to demonstrate use of these techniques in combination with low respiratory engagement with adequate accuracy and moderate clinician support  He found the sniff & balloon, and straw sip and balloon most helpful.   The puppy yawn triggered a brief coughing fit, which he was able to stop with the sip & blow.   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   A plan for when and how to implement these strategies was developed, and the patient was encouraged to practice the techniques independent of distress at least twice daily to habituate their use.   Anterior neck stretch with head tilt back was introduced, in combination with gentle phonation at target C#3.   He noted increased sensation/annoyance along the left side of his throat following the laryngeal aBductory maneuvers. We discussed that, if the rest of his throat felt more relaxed after use of the rescue breaths, then it's likely the left-side was tighter to start with and he is now more aware of it as the rest of the tissue releases.   Incremental progression of the anterior neck release was instructed, with good accy and understanding demonstrated.   Of note, he had 1 brief coughing fit during today's session, mitigated with the sip & blow. He cleared his throat 7x during the entire session. He was able to  speak throughout the session with significantly less coughing than would have previously been triggered with voice use.     Instruction of Home Practice:  A revised regimen for home practice was instructed.  I provided an AVS of important notes of today's therapeutic activities to facilitate practice.    ASSESSMENT/PLAN  Progress toward long-term goals:  Adequate but incomplete progress; please see above    Impressions:  IMPRESSIONS: chronic cough   Derrick had a productive session of therapy today, working on laryngeal aBductory maneuvers and an anterior neck stretch.  He will continue to work on his exercises on a daily basis, and work on incorporating the techniques into his daily activities. Speech therapy continues to be medically necessary for Derrick to participate fully and engage in activities of daily living.     Plan:   I will see Derrick in 4 weeks and will plan to check on 4 rescue breaths and anterior release. Transition from chanted C#3 spontaneous speech to more natural speaking inflection. Add sessions if approved.     For home practice goals, see AVS.     TOTAL SERVICE TIME: 50 minutes  TREATMENT (01882)    Milagros Landa (voice), M.S., CCC-SLP  Speech-Language Pathologist  [facility]  [phone]  [email or web address]  Pronouns: she/her/hers      *this report was created in part through the use of computerized dictation software, and though reviewed following completion, some typographic errors may persist.  If there is confusion regarding any of this notes contents, please contact me for clarification    OBJECTIVE FINDINGS  PATIENT REPORTED MEASURES  Patient Supplied Answers To Last 2 VHI Questionnaires       No data to display                   Patient Supplied Answers To Last 2 CSI Questionnaires      8/29/2024    10:48 AM 9/10/2024     9:47 AM   Cough Severity Index (CSI)   My cough is worse when I lie down 3 3   My coughing problem causes me to restrict my personal and social  life 4 3   I tend to avoid places because of my cough problem 3 3   I feel embarrassed because of my coughing problem 4 3   People ask, ''What's wrong?'' because I cough a lot 4 4   I run out of air when I cough 4 3   My coughing problem affects my voice 3 3   My coughing problem limits my physical activity 4 3   My coughing problem upsets me 4 3   People ask me if I am sick because I cough a lot 4 4   CSI Score 37 32          Patient Supplied Answers To Last 2 EAT Questionnaires       No data to display                  Patient Supplied Answers to Dyspnea Index Questionnaire:       No data to display                   Again, thank you for allowing me to participate in the care of your patient.      Sincerely,    Ora Shepard, SLP

## 2024-10-21 NOTE — NURSING NOTE
Unable to complete questionnaire due to time  restraints.       Marcie Daniel, Virtual Facilitator

## 2024-11-13 ENCOUNTER — TELEPHONE (OUTPATIENT)
Dept: OTOLARYNGOLOGY | Facility: CLINIC | Age: 54
End: 2024-11-13
Payer: COMMERCIAL

## 2024-11-13 NOTE — TELEPHONE ENCOUNTER
Left patient a voicemail to schedule surgery for LARYNGOSCOPY, FLEXIBLE WITH INJECTION with botox with Dr. De Paz - Left Surgery Scheduling line for callback [phone]    Franchesca Torres on 11/13/2024 at 8:17 AM

## 2024-11-18 NOTE — TELEPHONE ENCOUNTER
Scheduled surgery with Dr. De Paz on 12/6/2024    Spoke with: Patient    Surgery is located at [facility]    Patient does not require an H&P    Anesthesia type: Local      Holding on scheduling 1 week and 6 week post op until after confirmation on date. Need voice therapy set up for 2-3 weeks after the injection      Patient was not provided a start time for surgery & is aware they will receive this information 3-5 days before surgery    Additional comments: Patient repeated several times that he was told this only takes 10 minutes and was told he could get squeezed in anytime. Wants to do this week or next if possible     Patient will call back if they have any further questions or concerns    Franchesca Torres on 11/18/2024 at 1:50 PM

## 2024-11-19 PROBLEM — R05.3 CHRONIC COUGH: Status: ACTIVE | Noted: 2024-07-09

## 2024-11-25 ENCOUNTER — VIRTUAL VISIT (OUTPATIENT)
Dept: OTOLARYNGOLOGY | Facility: CLINIC | Age: 54
End: 2024-11-25
Payer: OTHER MISCELLANEOUS

## 2024-11-25 DIAGNOSIS — R05.3 CHRONIC COUGH: Primary | ICD-10-CM

## 2024-11-25 NOTE — PROGRESS NOTES
Derirck Colon is a 54 year old male who is being evaluated via a billable video visit.      Derrick has been notified and verbally consented to the following:   This video visit will be conducted between you and your provider.  Patient has opted to conduct today's video visit vs an in-person appointment.   Video visits are billed at different rates depending on your insurance coverage. Please reach out to your insurance provider with any questions.   If during the course of the call the provider feels the appointment is not appropriate, you will not be charged for this service.  Provider has received verbal consent for billable virtual visit from the patient? Yes  Will anyone else be joining your video visit? No    Call initiated at: 1200   Type of Visit Platform Used: [employer] Video  Location of provider: Home  Location of patient: [facility] VOICE CLINIC  PROGRESS REPORT (CPT 14987)    Patient: Aris Colon  Date of Service: 11/25/2024  Date of Last Service: 10/21/24  Number of Visits: 18/28 approved  Certification Dates: not needed  Referring Physician: Dr. Kathy De Paz    Impressions from evaluation on 11/17/23 by Mark Dutta M.S., CCC-SLP:  Derrick Colon presents today with ongoing chronic cough which began in conjunction with workplace particle exposure, and with recent exacerbation including hemoptysis and emesis.  Laryngeal evaluation today was warranted given recent episodes of hemoptysis to ensure the patient was not recovering from hemorrhagic or ulcerative event.  This demonstrated no acute trauma which is fortunate, though diffuse change to the vocal folds and surrounding tissues from longstanding cough was seen consistent with previous evaluation if slightly worsened.  Cough response was aggressive to the application of oxymetazoline nasal spray, and guarding and light cough was noted to occur even prior to the administration, which underscores both the hypersensitivity as well  as the stress base response to triggers that has developed over time.  Within the exam itself patient was able to attenuate cough through focused relaxation which bodes well for his ability to incorporate therapeutic strategies to reduce cough, though as we discussed full resolution of symptoms is often challenging due to their multifactorial nature especially in the case of irritant exposure.     SUBJECTIVE:  Since Derrick's last session, he reports the following:   He couldn't stop coughing during the video visit check-in process today.   He won his court case and was approved for 10 more speech visits.   Botox injection is scheduled for Dec 6th with Dr. De Paz.   He's continued going to pool in the past month, avoiding the french where there are more food smells.   When he has a coughing fit, he uses the sniff and balloon to help reduce the severity or duration.   He has been searching for a job, but he has a lot of restrictions (lifting, air quality) which makes it very difficult. 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   The recent change in weather and colder air has worsened his cough lately.   He occasionally tries to challenge himself and see if his cough is any better, but wasn't able to sleep without being inclined last night.     OBJECTIVE:  Patient Specific Goal Metrics:      8/29/2024    11:00 AM 9/10/2024    10:00 AM 11/25/2024    12:00 PM   Dysponia SLP Goals   How severe is your cough /throat clearing, if 0 is no cough at all and 10 is the worst cough? 7 7 7   How much does your cough/throat-clearing problem bother you?            Quite a bit Quite a bit Very Much     FO: 131-156Hz    THERAPEUTIC ACTIVITIES  Today Derrick participated in the following therapeutic activities:  Counseling and Education:  Asked questions about the nature of his symptoms, and I answered all of these thoroughly.    I provided Derrick with explanation and skilled  "instruction of:  Review of rescue breathing strategies using oral configurations to promote improved vocal fold abduction were instructed  Patient was able to demonstrate use of these techniques with significant improvement in combination with low respiratory engagement with good accuracy and minimal to moderate clinician support  A plan for when and how to implement these strategies was developed, and the patient was encouraged to practice the techniques independent of distress at least twice daily to habituate their use.   He is not yet able to add phonation with the anterior neck stretch due to triggering coughing, but has improved in him ability to simply stretch without coughing.     Resonant Voice Therapy (RVT) exercises to promote forward locus of resonance and optimized pattern of laryngeal adduction  Easy descending glide on /m/ utilized in conjunction with relaxed jaw, tongue, and lightly closed lips to facilitate forward resonant sound  Use of the carrier phrase \"mmhmm\" instructed to promote generalization to everyday speech  Word, phrase, and spontaneous speech level exercises featuring nasal continuant loaded stimuli  Special care was taken to maintain sense of open pharyngeal space for broadened resonance in conjunction with forward resonant principles  Use of inhalation to \"set the instrument\" for the remainder of the statement was facilitating.  Phrase level exercises featuring nasal continuants in more complex phonemic contexts were employed  Good accuracy with moderate support  Tendency towards lower than optimal pitch    Instruction of Home Practice:  A revised regimen for home practice was instructed.  I provided an AVS of important notes of today's therapeutic activities to facilitate practice.    ASSESSMENT/PLAN  Progress toward long-term goals:  Adequate but incomplete progress; please see above    Impressions:  IMPRESSIONS: chronic cough   Derrick had a productive session of therapy today, " working on review of rescue breathing tasks to redcue cough & progression of resonant voice tasks to spontaneous speech.  He will continue to work on his exercises on a daily basis, and work on incorporating the techniques into his daily activities. Speech therapy continues to be medically necessary for Derrick to participate fully and engage in activities of daily living.     Plan:   I will see Derrick in 3 weeks and will plan to transition from chanted C#3 spontaneous speech to more natural speaking inflection. Potentially reinstruct full cough avoidance strategies, or target flow phonation.    For home practice goals, see AVS.     TOTAL SERVICE TIME: 60 minutes  TREATMENT (01288)    Milagros Landa (voice), M.S., CCC-SLP  Speech-Language Pathologist  [facility]  [phone]  [email or web address]  Pronouns: she/her/hers      *this report was created in part through the use of computerized dictation software, and though reviewed following completion, some typographic errors may persist.  If there is confusion regarding any of this notes contents, please contact me for clarification    OBJECTIVE FINDINGS  PATIENT REPORTED MEASURES  Patient Supplied Answers To Last 2 VHI Questionnaires       No data to display                   Patient Supplied Answers To Last 2 CSI Questionnaires      9/10/2024     9:47 AM 11/25/2024    11:55 AM   Cough Severity Index (CSI)   My cough is worse when I lie down 3  4    My coughing problem causes me to restrict my personal and social life 3  3    I tend to avoid places because of my cough problem 3  4    I feel embarrassed because of my coughing problem 3  4    People ask, ''What's wrong?'' because I cough a lot 4  4    I run out of air when I cough 3  4    My coughing problem affects my voice 3  4    My coughing problem limits my physical activity 3  4    My coughing problem upsets me 3  4    People ask me if I am sick because I cough a lot 4  4    CSI Score 32 39         Patient-reported          Patient Supplied Answers To Last 2 EAT Questionnaires       No data to display                  Patient Supplied Answers to Dyspnea Index Questionnaire:       No data to display

## 2024-11-25 NOTE — LETTER
11/25/2024       RE: Aris Colon  [address] [place] [id number]     Dear Colleague,    Thank you for referring your patient, Aris Colon, to the [facility]ICE CLINIC [place] [facility]. Please see a copy of my visit note below.    Derrick Colon is a 54 year old male who is being evaluated via a billable video visit.      Derrick has been notified and verbally consented to the following:   This video visit will be conducted between you and your provider.  Patient has opted to conduct today's video visit vs an in-person appointment.   Video visits are billed at different rates depending on your insurance coverage. Please reach out to your insurance provider with any questions.   If during the course of the call the provider feels the appointment is not appropriate, you will not be charged for this service.  Provider has received verbal consent for billable virtual visit from the patient? Yes  Will anyone else be joining your video visit? No    Call initiated at: 1200   Type of Visit Platform Used: [employer] Video  Location of provider: Home  Location of patient: [facility] VOICE CLINIC  PROGRESS REPORT (CPT 00980)    Patient: Aris Colon  Date of Service: 11/25/2024  Date of Last Service: 10/21/24  Number of Visits: 18/28 approved  Certification Dates: not needed  Referring Physician: Dr. Kathy De Paz    Impressions from evaluation on 11/17/23 by Mark Dutta M.S., CCC-SLP:  Derrick Colon presents today with ongoing chronic cough which began in conjunction with workplace particle exposure, and with recent exacerbation including hemoptysis and emesis.  Laryngeal evaluation today was warranted given recent episodes of hemoptysis to ensure the patient was not recovering from hemorrhagic or ulcerative event.  This demonstrated no acute trauma which is fortunate, though diffuse change to the vocal  folds and surrounding tissues from longstanding cough was seen consistent with previous evaluation if slightly worsened.  Cough response was aggressive to the application of oxymetazoline nasal spray, and guarding and light cough was noted to occur even prior to the administration, which underscores both the hypersensitivity as well as the stress base response to triggers that has developed over time.  Within the exam itself patient was able to attenuate cough through focused relaxation which bodes well for his ability to incorporate therapeutic strategies to reduce cough, though as we discussed full resolution of symptoms is often challenging due to their multifactorial nature especially in the case of irritant exposure.     SUBJECTIVE:  Since Derrick's last session, he reports the following:   He couldn't stop coughing during the video visit check-in process today.   He won his court case and was approved for 10 more speech visits.   Botox injection is scheduled for Dec 6th with Dr. De Paz.   He's continued going to pool in the past month, avoiding the french where there are more food smells.   When he has a coughing fit, he uses the sniff and balloon to help reduce the severity or duration.   He has been searching for a job, but he has a lot of restrictions (lifting, air quality) which makes it very difficult. 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   The recent change in weather and colder air has worsened his cough lately.   He occasionally tries to challenge himself and see if his cough is any better, but wasn't able to sleep without being inclined last night.     OBJECTIVE:  Patient Specific Goal Metrics:      8/29/2024    11:00 AM 9/10/2024    10:00 AM 11/25/2024    12:00 PM   Dysponia SLP Goals   How severe is your cough /throat clearing, if 0 is no cough at all and 10 is the worst cough? 7 7 7   How much does your cough/throat-clearing problem  "bother you?            Quite a bit Quite a bit Very Much     FO: 131-156Hz    THERAPEUTIC ACTIVITIES  Today Derrick participated in the following therapeutic activities:  Counseling and Education:  Asked questions about the nature of his symptoms, and I answered all of these thoroughly.    I provided Derrick with explanation and skilled instruction of:  Review of rescue breathing strategies using oral configurations to promote improved vocal fold abduction were instructed  Patient was able to demonstrate use of these techniques with significant improvement in combination with low respiratory engagement with good accuracy and minimal to moderate clinician support  A plan for when and how to implement these strategies was developed, and the patient was encouraged to practice the techniques independent of distress at least twice daily to habituate their use.   He is not yet able to add phonation with the anterior neck stretch due to triggering coughing, but has improved in him ability to simply stretch without coughing.     Resonant Voice Therapy (RVT) exercises to promote forward locus of resonance and optimized pattern of laryngeal adduction  Easy descending glide on /m/ utilized in conjunction with relaxed jaw, tongue, and lightly closed lips to facilitate forward resonant sound  Use of the carrier phrase \"mmhmm\" instructed to promote generalization to everyday speech  Word, phrase, and spontaneous speech level exercises featuring nasal continuant loaded stimuli  Special care was taken to maintain sense of open pharyngeal space for broadened resonance in conjunction with forward resonant principles  Use of inhalation to \"set the instrument\" for the remainder of the statement was facilitating.  Phrase level exercises featuring nasal continuants in more complex phonemic contexts were employed  Good accuracy with moderate support  Tendency towards lower than optimal pitch    Instruction of Home Practice:  A revised regimen " for home practice was instructed.  I provided an AVS of important notes of today's therapeutic activities to facilitate practice.    ASSESSMENT/PLAN  Progress toward long-term goals:  Adequate but incomplete progress; please see above    Impressions:  IMPRESSIONS: chronic cough   Derrick had a productive session of therapy today, working on review of rescue breathing tasks to redcue cough & progression of resonant voice tasks to spontaneous speech.  He will continue to work on his exercises on a daily basis, and work on incorporating the techniques into his daily activities. Speech therapy continues to be medically necessary for Derrick to participate fully and engage in activities of daily living.     Plan:   I will see Derrick in 3 weeks and will plan to transition from chanted C#3 spontaneous speech to more natural speaking inflection. Potentially reinstruct full cough avoidance strategies, or target flow phonation.    For home practice goals, see AVS.     TOTAL SERVICE TIME: 60 minutes  TREATMENT (21716)    Milagros Landa (voice) MNikolaiS., CCC-SLP  Speech-Language Pathologist  [facility]  [phone]  [email or web address]  Pronouns: she/her/hers      *this report was created in part through the use of computerized dictation software, and though reviewed following completion, some typographic errors may persist.  If there is confusion regarding any of this notes contents, please contact me for clarification    OBJECTIVE FINDINGS  PATIENT REPORTED MEASURES  Patient Supplied Answers To Last 2 VHI Questionnaires       No data to display                   Patient Supplied Answers To Last 2 CSI Questionnaires      9/10/2024     9:47 AM 11/25/2024    11:55 AM   Cough Severity Index (CSI)   My cough is worse when I lie down 3  4    My coughing problem causes me to restrict my personal and social life 3  3    I tend to avoid places because of my cough problem 3  4    I feel embarrassed because of my coughing  problem 3  4    People ask, ''What's wrong?'' because I cough a lot 4  4    I run out of air when I cough 3  4    My coughing problem affects my voice 3  4    My coughing problem limits my physical activity 3  4    My coughing problem upsets me 3  4    People ask me if I am sick because I cough a lot 4  4    CSI Score 32 39        Patient-reported          Patient Supplied Answers To Last 2 EAT Questionnaires       No data to display                  Patient Supplied Answers to Dyspnea Index Questionnaire:       No data to display                 Again, thank you for allowing me to participate in the care of your patient.      Sincerely,    Ora Shepard, SLP

## 2024-11-25 NOTE — PATIENT INSTRUCTIONS
"Hello!     It was a pleasure to see you today. Please complete the exercises below and remember, a few minutes of practice many times throughout the day is more important than one large practice session. If you have any questions about your strategies, feel free to contact me at [email or web address] or via [employer]. Please call [phone] for scheduling alterations or to be seen sooner in case of cancellations.      Home Exercise Program:  \"Weird\" is not always bad. Sometimes it's just new or different than what you're used to. It might mean \"change in the right direction.\"      When you start to cough:   SIPPY BREATH IN & BALLOON CHEEKS OUT, SIP & BALLOON, SIP & BALLOON...  Chest up and shoulders back, belly crunch     THROAT STRETCH  - This may very likely trigger coughing.   - The point of this exercise is to slowly stretch and relax tissue that is currently really tight, like the spot along the left-side.   1. Lay on your couch, with your head over the armrest.   2. Slowly tilt your head back, lifting your chin.   3. Only go as far as feels comfortable or doesn't trigger coughing. Hold, slowly breathing in and humming, 5x.   4. Then release. Next time, you can tilt your head back a little further.         Seasonal/Dryness Strategies:  Around the fall season, there can be more dryness in the air and people start being more prone to catching colds. Some strategies to help keep yourself feeling better are:  1. Gargling a little bit of warm water mixed with salt and baking soda can be tremendously helpful. It's about 2 oz warm water mixed with 1/4-1/2 tsp mixture (half salt/half baking soda). Plan to do this every morning and evening before brushing your teeth. If you find it helpful, you can gargle and spit in the sink multiple times a day.   2. If you feel mucusy in your throat (or if you can't spit in the sink) then gargle plain water and swallow it.   3. A humidifier can be really helpful in the " "fall/winter, but you can just use a kitchen bowl filled with hot, steamy water on the table next to you or your nightstand.   4. You can wring out a washcloth with hot or cold water, holding it over your nose and mouth for a few minutes while breathing through it.         BUZZY VOICE- ALL .6Hz or C#3  Practice throughout the day, whenever you get a chance  1. 10x \"hmmm\" for 5-7 seconds   Focus on \"nasal\" or \"twangy\" vibrations in your lips/nose on \"mmm\"  Belly muscles crunches upward and inward  2. Hmm...moomoomoo, moemoemoe, mememe, mamama, mymymy, maymaymay, mehmehmeh  Focus on \"nasal\" or \"twangy\" vibrations in your lips/nose on \"mmm\"  Belly muscles crunches upward and inward  3. Hmm...moon, mom, mean, man, mine, moan, men, main, morning, meaning, manners, mammals  You can start doing 2-3 words together   Focus on \"nasal\" or \"twangy\" vibrations in your lips/nose on \"mmm\"  Belly muscles crunches upward and inward  Chest stays tall  4. Sentences: Still chanted on 138.59Hz   Hmm...Mother knits many mittens.   Nobody knows Alejandra s name.              Magaly s measles made Magaly miserable.   Milking machines make much noise.    Mild-mannered Kylie  sunday Rodrigues.   Kelvin marched many, many miles.       Millions made money in the market in March.   My mother munches melon on Mondays.  Maybe my memory needs mending.   Genesis made more money.   Magayl made many muffins.   Merlin motored many miles.   Kelvin makes major messes.   My momma made meatloaf.   Music makes mellow moods.  Hmmy favorite____ is ____; Nnnni like _____  HmmI'm going_____; HmmI need to_____. Starting with the buzzy sound each phrase.  Conversation, talking to yourself around the house, road signs/license plates, 20 questions, etc.       1st: Chanted on 1 pitch (138.59Hz or C#3) for now, like a monk or robot.   Focus on \"nasal\" or \"twangy\" vibrations in your lips/nose on \"mmm\"  Belly muscles crunches upward and inward, BUT try to use your belly " "muscles a little less now.   If you feel a tickle, SNIFF/SIP & Balloon until the tickle goes away.   ____________________________________________________     ANTI-TIGHT THROAT BREATHS   (4-5 breaths, several times a day)  These strategies are designed to help open and relax your throat, specifically whenever you're feeling tight or like it's hard to breathe.      SNIFF & BULLFROG: Imagine smelling something delicious, then blowing through ballooned cheeks to cool it down.   STRAW SIP & BULLFROG: Breathe in through rounded lips, like sipping air, then blow out with ballooned cheeks. You already use this to avoid or stop coughing, but it also helps loosen the throat.  TONGUE TUCK: Place the tip of your tongue against the roof of your mouth, creating a column in the center. Breathe in and feel cool & silent air sweep in along the sides of your tongue and down the back of your throat. Now keep your tongue tip in place and simply exhale.   This strategy will make your mouth dry, so stop to take a sip of water. Or, if you don't have any water nearby, you can pause to gently nibble the sides of your tongue with your molars. This tricks your brain into thinking you're eating and your mouth start to water, then move some saliva around with your tongue whenever needed, then return to your strategy.   The tongue tuck is the best strategy if you need to wear a face mask  MARK or \"Hand Breath\": Inhale with a BIG OPEN MOUTH through an ice-cream cone shaped hand. The inhale should be SILENT! Then blow out through a fist so your cheeks balloon.     Go very slowly so you don't get dizzy. Don't blow so hard that you see spots/diamonds/get dizzy. Take breaks as needed.      Thank you and have a great day!  Rosendo Marmolejo. (voice), M.S., CCC-SLP  Speech-Language Pathologist  [facility]  [phone]  [email or web address]  Pronouns: she/her/hers  "

## 2024-12-06 ENCOUNTER — HOSPITAL ENCOUNTER (OUTPATIENT)
Facility: AMBULATORY SURGERY CENTER | Age: 54
Discharge: HOME | End: 2024-12-06
Attending: OTOLARYNGOLOGY | Admitting: OTOLARYNGOLOGY
Payer: COMMERCIAL

## 2024-12-06 VITALS
BODY MASS INDEX: 30.06 KG/M2 | DIASTOLIC BLOOD PRESSURE: 90 MMHG | HEART RATE: 83 BPM | HEIGHT: 70 IN | SYSTOLIC BLOOD PRESSURE: 109 MMHG | TEMPERATURE: 97.7 F | RESPIRATION RATE: 14 BRPM | WEIGHT: 210 LBS | OXYGEN SATURATION: 99 %

## 2024-12-06 PROCEDURE — 64617 CHEMODENER MUSCLE LARYNX EMG: CPT | Mod: RT | Performed by: OTOLARYNGOLOGY

## 2024-12-06 RX ORDER — LIDOCAINE HYDROCHLORIDE 40 MG/ML
SOLUTION TOPICAL PRN
Status: DISCONTINUED | OUTPATIENT
Start: 2024-12-06 | End: 2024-12-06 | Stop reason: HOSPADM

## 2024-12-06 NOTE — DISCHARGE INSTRUCTIONS
[facility]  Home Care Following Your Procedure  Call a doctor if you have signs of infection (fever, growing tenderness at the surgery site, a large amount of drainage or bleeding, severe pain, foul-smelling drainage, redness, swelling).             Tylenol/Acetaminophen Consumption    If you feel your pain relief is insufficient, you may take Tylenol/Acetaminophen in addition to your narcotic pain medication.   Be careful not to exceed 4,000 mg of Tylenol/Acetaminophen in a 24 hour period from all sources.  If you are taking extra strength Tylenol/acetaminophen (500 mg), the maximum dose is 8 tablets in 24 hours.  If you are taking regular strength acetaminophen (325 mg), the maximum dose is 12 tablets in 24 hours.      Your doctor is:  Dr. Kathy De Paz, ENT Otolaryngology: [phone]                Diet restrictions: NPO until 11AM  Voice restrictions: none  Use tylenol as needed over the counter for pain   Other:   Sore throat and discomfort are normal for a few days since you've had an awake procedure    Or dial [phone] and ask for the resident on call for:  Ophthalmology  For emergency care, call the:  [place]:  [phone] (TTY for hearing impaired: [phone])

## 2024-12-06 NOTE — OP NOTE
Operative Note   Otolaryngology - Head and Neck Surgery       DATE OF OPERATION:   December 6, 2024    PREOPERATIVE DIAGNOSIS:   Laryngeal spasm  Chronic cough     POSTOPERATIVE DIAGNOSIS:   Same    NAME OF OPERATION:   Laryngoscopy with chemodenervation of the larynx  with botulinum toxin    ANESTHESIA  Type: local    SURGEON:   Kathy De Paz MD    RESIDENT SURGEON(S):   Bryson Casanova MD    INDICATIONS FOR PROCEDURE:   The patient is a 54 year old male with hyperfunctional larynx. Botox injections at regular intervals are routine therapy for hyperfunctional larynx such as spasmodic dysphonia, laryngospasm, chronic cough, paradoxical vocal fold motion, laryngeal synkinesis.The risks, benefits and alternatives of the surgery were discussed. The patient wishes to proceed with surgery and has signed an informed consent.     FINDINGS:     1.0 units of Botox into the Right thyroarytenoid muscle, did not inject fully into the left vocal fold     DESCRIPTION OF PROCEDURE:   The patient was brought into the operating room and  seated upright in the chair. Topical anesthesia was achieved by spraying 4% topical lidocaine directly onto the vocal folds through a working channel in the endoscope.  After adequate anesthesia was achieved, a flexible needle was used to inject directly the material into the thyroartyenoid muscle under visualization. Having completed this the operation was completed and the scope withdrawn atraumatically.     COMPLICATIONS:   None.  .   ESTIMATED BLOOD LOSS:   Less than 10cc    DISPOSITION:   PACU.    SPECIMENS:  * No specimens in log *       PHOTODOCUMENTATION:

## 2024-12-18 ENCOUNTER — VIRTUAL VISIT (OUTPATIENT)
Dept: OTOLARYNGOLOGY | Facility: CLINIC | Age: 54
End: 2024-12-18
Payer: OTHER MISCELLANEOUS

## 2024-12-18 DIAGNOSIS — R05.3 CHRONIC COUGH: Primary | ICD-10-CM

## 2024-12-18 NOTE — PATIENT INSTRUCTIONS
"Hello!     It was a pleasure to see you today. Please complete the exercises below and remember, a few minutes of practice many times throughout the day is more important than one large practice session. If you have any questions about your strategies, feel free to contact me at [email or web address] or via "[email or web address]. Please call [phone] for scheduling alterations or to be seen sooner in case of cancellations.      Home Exercise Program:  \"Weird\" is not always bad. Sometimes it's just new or different than what you're used to. It might mean \"change in the right direction.\"      When you start to cough:   SNIFF BREATH IN & BALLOON CHEEKS OUT, SNIFF & BALLOON, SNIFF & BALLOON...  FEEL THE AIR FLOWING OUT AGAINST YOUR FINGERS/HAND  Chest up and shoulders back, belly crunch     THROAT STRETCH  - This may very likely trigger coughing.   - The point of this exercise is to slowly stretch and relax tissue that is currently really tight, like the spot along the left-side.   1. Lay on your couch, with your head over the armrest.   2. Slowly tilt your head back, lifting your chin.   3. Only go as far as feels comfortable or doesn't trigger coughing. Hold, slowly breathing in and humming, 5x.   4. Then release. Next time, you can tilt your head back a little further.      ____________________________________________________    Seasonal/Dryness Strategies:  Around the fall season, there can be more dryness in the air and people start being more prone to catching colds. Some strategies to help keep yourself feeling better are:  1. Gargling a little bit of warm water mixed with salt and baking soda can be tremendously helpful. It's about 2 oz warm water mixed with 1/4-1/2 tsp mixture (half salt/half baking soda). Plan to do this every morning and evening before brushing your teeth. If you find it helpful, you can gargle and spit in the sink multiple times a day.   2. If you feel mucusy in your throat (or if you can't " "spit in the sink) then gargle plain water and swallow it.   3. A humidifier can be really helpful in the fall/winter, but you can just use a kitchen bowl filled with hot, steamy water on the table next to you or your nightstand.   4. You can wring out a washcloth with hot or cold water, holding it over your nose and mouth for a few minutes while breathing through it.    ____________________________________________________       BUZZY VOICE- ALL .6Hz or C#3  Practice throughout the day, whenever you get a chance. Your hands can rest of your belly, but not pull.   1. 10x \"hmmm\" for 5-7 seconds   Focus on \"nasal\" or \"twangy\" vibrations in your lips/nose on \"mmm\"  Belly muscles crunches upward and inward  2. Hmm...moomoomoo, moemoemoe, mememe, mamama, mymymy, maymaymay, mehmehmeh  Focus on \"nasal\" or \"twangy\" vibrations in your lips/nose on \"mmm\"  Belly muscles crunches upward and inward  3. Hmm...moon, mom, mean, man, mine, moan, men, main, morning, meaning, manners, mammals  You can start doing 2-3 words together   Focus on \"nasal\" or \"twangy\" vibrations in your lips/nose on \"mmm\"  Belly muscles crunches upward and inward  Chest stays tall  4. Sentences: Still chanted on 138.59Hz   Hmm...Mother knits many mittens.   Nobody knows Alejandra s name.              Magaly s measles made Magaly miserable.   Milking machines make much noise.    Mild-mannered Kylie  sunday Rodrigues.   Kelvin marched many, many miles.       Millions made money in the market in March.   My mother munches melon on Mondays.  Maybe my memory needs mending.   Genesis made more money.   Magaly made many muffins.   Merlin motored many miles.   Kelvin makes major messes.   My momma made meatloaf.   Music makes mellow moods.  Hmmy favorite____ is ____; Nnnni like _____  HmmI'm going_____; HmmI need to_____. Starting with the buzzy sound each phrase.  Conversation, talking to yourself around the house, road signs/license plates, 20 questions, etc.       1st: " "Chanted on 1 pitch (138.59Hz or C#3) for now, like a monk or robot.   Focus on \"nasal\" or \"twangy\" vibrations in your lips/nose on \"mmm\"  Monitor your belly muscles with your hands, without pulling.  If you feel a tickle, SNIFF & Balloon until the tickle goes away.   ____________________________________________________     ANTI-TIGHT THROAT BREATHS   (4-5 breaths, several times a day)  These strategies are designed to help open and relax your throat, specifically whenever you're feeling tight or like it's hard to breathe.      SNIFF & BULLFROG: Imagine smelling something delicious, then blowing through ballooned cheeks to cool it down.   STRAW SIP & BULLFROG: Breathe in through rounded lips, like sipping air, then blow out with ballooned cheeks. You already use this to avoid or stop coughing, but it also helps loosen the throat.  TONGUE TUCK: Place the tip of your tongue against the roof of your mouth, creating a column in the center. Breathe in and feel cool & silent air sweep in along the sides of your tongue and down the back of your throat. Now keep your tongue tip in place and simply exhale.   This strategy will make your mouth dry, so stop to take a sip of water. Or, if you don't have any water nearby, you can pause to gently nibble the sides of your tongue with your molars. This tricks your brain into thinking you're eating and your mouth start to water, then move some saliva around with your tongue whenever needed, then return to your strategy.   The tongue tuck is the best strategy if you need to wear a face mask  MARK or \"Hand Breath\": Inhale with a BIG OPEN MOUTH through an ice-cream cone shaped hand. The inhale should be SILENT! Then blow out through a fist so your cheeks balloon.      Go very slowly so you don't get dizzy. Don't blow so hard that you see spots/diamonds/get dizzy. Take breaks as needed.      Thank you and have a great day!  Rosendo Marmolejo. (voice), M.S., " CCC-SLP  Speech-Language Pathologist  [facility]  [phone]  [email or web address]  Pronouns: she/her/hers

## 2024-12-18 NOTE — PROGRESS NOTES
Derrick Colon is a 54 year old male who is being evaluated via a billable video visit.      Derrick has been notified and verbally consented to the following:   This video visit will be conducted between you and your provider.  Patient has opted to conduct today's video visit vs an in-person appointment.   Video visits are billed at different rates depending on your insurance coverage. Please reach out to your insurance provider with any questions.   If during the course of the call the provider feels the appointment is not appropriate, you will not be charged for this service.  Provider has received verbal consent for billable virtual visit from the patient? Yes  Will anyone else be joining your video visit? No    Call initiated at: 1000   Type of Visit Platform Used: DashBurst Video  Location of provider: Home  Location of patient: [facility]  PROGRESS REPORT (CPT 08897)    Patient: Aris Colon  Date of Service: 12/18/2024  Date of Last Service: 11/25/24  Number of Visits: 19/28 approved  Referring Physician: Dr. Kathy De Paz    Impressions from evaluation on 11/17/23 by Mark Dutta M.S., CCC-SLP:  Derrick Colon presents today with ongoing chronic cough which began in conjunction with workplace particle exposure, and with recent exacerbation including hemoptysis and emesis.  Laryngeal evaluation today was warranted given recent episodes of hemoptysis to ensure the patient was not recovering from hemorrhagic or ulcerative event.  This demonstrated no acute trauma which is fortunate, though diffuse change to the vocal folds and surrounding tissues from longstanding cough was seen consistent with previous evaluation if slightly worsened.  Cough response was aggressive to the application of oxymetazoline nasal spray, and guarding and light cough was noted to occur even prior to the administration, which underscores both the hypersensitivity as well as the stress base response to  triggers that has developed over time.  Within the exam itself patient was able to attenuate cough through focused relaxation which bodes well for his ability to incorporate therapeutic strategies to reduce cough, though as we discussed full resolution of symptoms is often challenging due to their multifactorial nature especially in the case of irritant exposure.        SUBJECTIVE:  Since Derrick's last session, he reports the following:   He had botox injections done on December 6th with Dr. De Paz, but feels they didn't work. He feels like the same triggers are still an issue for triggering his cough. Smells, cold air, fumes still trigger his coughing.   He threw up due to coughing 3x since his injection on December 6th.  He tries to use his sip and balloon technique, but sometimes feels like the inhalation makes the cough worse.     OBJECTIVE:  Patient Specific Goal Metrics:      8/29/2024    11:00 AM 9/10/2024    10:00 AM 11/25/2024    12:00 PM   Dysponia SLP Goals   How severe is your cough /throat clearing, if 0 is no cough at all and 10 is the worst cough? 7 7 7   How much does your cough/throat-clearing problem bother you?            Quite a bit Quite a bit Very Much     FO: 92-98Hz    THERAPEUTIC ACTIVITIES  Today Derrick participated in the following therapeutic activities:  Counseling and Education:  Asked questions about the nature of his symptoms, and I answered all of these thoroughly.  Additional education regarding botox injections and the possible outcomes was provided, specifically that there are varying degrees of effectiveness depending on each patient receiving an injection. We also discussed that, even if the effectiveness of the botox is small, the goal is to use that small improvement to progress in therapy at the same time.   We will add a session mid-January in order to optimize therapeutic outcomes while the botox is still present.    I provided Derrick with explanation and skilled instruction  "of:  Resonant Voice Therapy (RVT) exercises to promote forward locus of resonance and optimized pattern of laryngeal adduction  Easy descending glide on /m/ utilized in conjunction with relaxed jaw, tongue, and lightly closed lips to facilitate forward resonant sound  Use of the carrier phrase \"mmhmm\" instructed to promote generalization to everyday speech  Word level exercises featuring nasal continuant loaded stimuli  Special care was taken to maintain sense of open pharyngeal space for broadened resonance in conjunction with forward resonant principles  Use of inhalation to \"set the instrument\" for the remainder of the statement was facilitating.  Phrase level exercises featuring nasal continuants in more complex phonemic contexts were employed  Good accuracy with moderate support  Tendency towards lower than optimal pitch  He is not yet able to generalize the target pitch to normal speaking inflection, consistently dropping by half an octave to ~92-98Hz and resulting in coughing.     He notably started the session with 5 coughing fits, which were relatively short (2-3 coughs) followed by sniff and balloon strategy to mitigate further coughing.   This frequency decreased to 30 minutes without any coughing while targeting elevated speaking frequency of 139Hz, though therapist observed patient pausing to use his sniff & balloon strategies 2-3 times during conversation.   Once he shifted from phonatory target practice to spontaneous conversation, 4 additional coughing fits were observed, again mitigated with sniff and balloon strategy.   He was able to laugh x2 during conversation without triggering cough, which is an improvement.     Instruction of Home Practice:  A revised regimen for home practice was instructed.  I provided an AVS of important notes of today's therapeutic activities to facilitate practice.    ASSESSMENT/PLAN  Progress toward long-term goals:  Adequate but incomplete progress; please see " above    Impressions:  IMPRESSIONS: chronic cough   Derrick had a productive session of therapy today, working on reinforcement of chanted target speaking pitch.  He will continue to work on his exercises on a daily basis, and work on incorporating the techniques into his daily activities. Speech therapy continues to be medically necessary for Derrick to participate fully and engage in activities of daily living.     Plan:   I will see Derrick in 3 weeks and will plan to continue reinforcing chanted C#3 spontaneous speech to more natural speaking inflection. Continue monitoring for less drastic abdominal contraction. Potentially reinstruct full cough avoidance strategies, or target flow phonation.     For home practice goals, see AVS.     TOTAL SERVICE TIME: 60 minutes  TREATMENT ([id number])    Milagros Landa (voice), M.S., CCC-SLP  Speech-Language Pathologist  [facility]  [phone]  [email or web address]  Pronouns: she/her/hers      *this report was created in part through the use of computerized dictation software, and though reviewed following completion, some typographic errors may persist.  If there is confusion regarding any of this notes contents, please contact me for clarification    OBJECTIVE FINDINGS  PATIENT REPORTED MEASURES  Patient Supplied Answers To Last 2 VHI Questionnaires       No data to display                   Patient Supplied Answers To Last 2 CSI Questionnaires      11/25/2024    11:55 AM 12/18/2024     9:45 AM   Cough Severity Index (CSI)   My cough is worse when I lie down 4  2    My coughing problem causes me to restrict my personal and social life 3  4    I tend to avoid places because of my cough problem 4  4    I feel embarrassed because of my coughing problem 4  4    People ask, ''What's wrong?'' because I cough a lot 4  4    I run out of air when I cough 4  4    My coughing problem affects my voice 4  2    My coughing problem limits my physical activity 4  4    My  coughing problem upsets me 4  4    People ask me if I am sick because I cough a lot 4  4    CSI Score 39  36        Patient-reported          Patient Supplied Answers To Last 2 EAT Questionnaires       No data to display                  Patient Supplied Answers to Dyspnea Index Questionnaire:       No data to display

## 2024-12-18 NOTE — LETTER
12/18/2024       RE: Aris Colon  [address] [place] [id number]     Dear Colleague,    Thank you for referring your patient, Aris Colon, to the [facility] VOICE CLINIC [place] at [facility]. Please see a copy of my visit note below.    Derrick Colon is a 54 year old male who is being evaluated via a billable video visit.      Derrick has been notified and verbally consented to the following:   This video visit will be conducted between you and your provider.  Patient has opted to conduct today's video visit vs an in-person appointment.   Video visits are billed at different rates depending on your insurance coverage. Please reach out to your insurance provider with any questions.   If during the course of the call the provider feels the appointment is not appropriate, you will not be charged for this service.  Provider has received verbal consent for billable virtual visit from the patient? Yes  Will anyone else be joining your video visit? No    Call initiated at: 1000   Type of Visit Platform Used: Orchestrate Video  Location of provider: Home  Location of patient: [facility] VOICE CLINIC  PROGRESS REPORT (CPT 65243)    Patient: Aris Colon  Date of Service: 12/18/2024  Date of Last Service: 11/25/24  Number of Visits: 19/28 approved  Referring Physician: Dr. Kathy De Paz    Impressions from evaluation on 11/17/23 by Mark Dutta M.S., CCC-SLP:  Derrick Colon presents today with ongoing chronic cough which began in conjunction with workplace particle exposure, and with recent exacerbation including hemoptysis and emesis.  Laryngeal evaluation today was warranted given recent episodes of hemoptysis to ensure the patient was not recovering from hemorrhagic or ulcerative event.  This demonstrated no acute trauma which is fortunate, though diffuse change to the vocal folds and surrounding tissues  from longstanding cough was seen consistent with previous evaluation if slightly worsened.  Cough response was aggressive to the application of oxymetazoline nasal spray, and guarding and light cough was noted to occur even prior to the administration, which underscores both the hypersensitivity as well as the stress base response to triggers that has developed over time.  Within the exam itself patient was able to attenuate cough through focused relaxation which bodes well for his ability to incorporate therapeutic strategies to reduce cough, though as we discussed full resolution of symptoms is often challenging due to their multifactorial nature especially in the case of irritant exposure.        SUBJECTIVE:  Since Derrick's last session, he reports the following:   He had botox injections done on December 6th with Dr. De Paz, but feels they didn't work. He feels like the same triggers are still an issue for triggering his cough. Smells, cold air, fumes still trigger his coughing.   He threw up due to coughing 3x since his injection on December 6th.  He tries to use his sip and balloon technique, but sometimes feels like the inhalation makes the cough worse.     OBJECTIVE:  Patient Specific Goal Metrics:      8/29/2024    11:00 AM 9/10/2024    10:00 AM 11/25/2024    12:00 PM   Dysponia SLP Goals   How severe is your cough /throat clearing, if 0 is no cough at all and 10 is the worst cough? 7 7 7   How much does your cough/throat-clearing problem bother you?            Quite a bit Quite a bit Very Much     FO: 92-98Hz    THERAPEUTIC ACTIVITIES  Today Derrick participated in the following therapeutic activities:  Counseling and Education:  Asked questions about the nature of his symptoms, and I answered all of these thoroughly.  Additional education regarding botox injections and the possible outcomes was provided, specifically that there are varying degrees of effectiveness depending on each patient receiving an  "injection. We also discussed that, even if the effectiveness of the botox is small, the goal is to use that small improvement to progress in therapy at the same time.   We will add a session mid-January in order to optimize therapeutic outcomes while the botox is still present.    I provided Derrick with explanation and skilled instruction of:  Resonant Voice Therapy (RVT) exercises to promote forward locus of resonance and optimized pattern of laryngeal adduction  Easy descending glide on /m/ utilized in conjunction with relaxed jaw, tongue, and lightly closed lips to facilitate forward resonant sound  Use of the carrier phrase \"mmhmm\" instructed to promote generalization to everyday speech  Word level exercises featuring nasal continuant loaded stimuli  Special care was taken to maintain sense of open pharyngeal space for broadened resonance in conjunction with forward resonant principles  Use of inhalation to \"set the instrument\" for the remainder of the statement was facilitating.  Phrase level exercises featuring nasal continuants in more complex phonemic contexts were employed  Good accuracy with moderate support  Tendency towards lower than optimal pitch  He is not yet able to generalize the target pitch to normal speaking inflection, consistently dropping by half an octave to ~92-98Hz and resulting in coughing.     He notably started the session with 5 coughing fits, which were relatively short (2-3 coughs) followed by sniff and balloon strategy to mitigate further coughing.   This frequency decreased to 30 minutes without any coughing while targeting elevated speaking frequency of 139Hz, though therapist observed patient pausing to use his sniff & balloon strategies 2-3 times during conversation.   Once he shifted from phonatory target practice to spontaneous conversation, 4 additional coughing fits were observed, again mitigated with sniff and balloon strategy.   He was able to laugh x2 during conversation " without triggering cough, which is an improvement.     Instruction of Home Practice:  A revised regimen for home practice was instructed.  I provided an AVS of important notes of today's therapeutic activities to facilitate practice.    ASSESSMENT/PLAN  Progress toward long-term goals:  Adequate but incomplete progress; please see above    Impressions:  IMPRESSIONS: chronic cough   Derrick had a productive session of therapy today, working on reinforcement of chanted target speaking pitch.  He will continue to work on his exercises on a daily basis, and work on incorporating the techniques into his daily activities. Speech therapy continues to be medically necessary for Derrick to participate fully and engage in activities of daily living.     Plan:   I will see Derrick in 3 weeks and will plan to continue reinforcing chanted C#3 spontaneous speech to more natural speaking inflection. Continue monitoring for less drastic abdominal contraction. Potentially reinstruct full cough avoidance strategies, or target flow phonation.     For home practice goals, see AVS.     TOTAL SERVICE TIME: 60 minutes  TREATMENT (28791)    Milagros Landa (voice), M.S., CCC-SLP  Speech-Language Pathologist  [facility]  [phone]  [email or web address]  Pronouns: she/her/hers      *this report was created in part through the use of computerized dictation software, and though reviewed following completion, some typographic errors may persist.  If there is confusion regarding any of this notes contents, please contact me for clarification    OBJECTIVE FINDINGS  PATIENT REPORTED MEASURES  Patient Supplied Answers To Last 2 VHI Questionnaires       No data to display                   Patient Supplied Answers To Last 2 CSI Questionnaires      11/25/2024    11:55 AM 12/18/2024     9:45 AM   Cough Severity Index (CSI)   My cough is worse when I lie down 4  2    My coughing problem causes me to restrict my personal and social life  3  4    I tend to avoid places because of my cough problem 4  4    I feel embarrassed because of my coughing problem 4  4    People ask, ''What's wrong?'' because I cough a lot 4  4    I run out of air when I cough 4  4    My coughing problem affects my voice 4  2    My coughing problem limits my physical activity 4  4    My coughing problem upsets me 4  4    People ask me if I am sick because I cough a lot 4  4    CSI Score 39  36        Patient-reported          Patient Supplied Answers To Last 2 EAT Questionnaires       No data to display                  Patient Supplied Answers to Dyspnea Index Questionnaire:       No data to display                 Again, thank you for allowing me to participate in the care of your patient.      Sincerely,    Ora Shepard, SLP

## 2025-01-08 ENCOUNTER — VIRTUAL VISIT (OUTPATIENT)
Dept: OTOLARYNGOLOGY | Facility: CLINIC | Age: 55
End: 2025-01-08

## 2025-01-08 DIAGNOSIS — R05.3 CHRONIC COUGH: Primary | ICD-10-CM

## 2025-01-08 NOTE — PATIENT INSTRUCTIONS
"Hello!     It was a pleasure to see you today. Please complete the exercises below and remember, a few minutes of practice many times throughout the day is more important than one large practice session. If you have any questions about your strategies, feel free to contact me at [email or web address] or via [employer]. Please call [phone] for scheduling alterations or to be seen sooner in case of cancellations.      Home Exercise Program:  \"Weird\" is not always bad. Sometimes it's just new or different than what you're used to. It might mean \"change in the right direction.\"      When you start to cough:   SNIFF BREATH IN & BALLOON CHEEKS OUT, SNIFF & BALLOON, SNIFF & BALLOON...  FEEL THE AIR FLOWING OUT AGAINST YOUR FINGERS/HAND  Chest up and shoulders back, belly crunch    ____________________________________________________      VOICE EXERCISES (once every other hour if you have time)  -- Nearly full glass of water, slightly tilted with ROUNDED lips.  -- \"Whoooooo\" (smooth, fast, even bubbles like a motor boat or a simmering pot of water)  -- ROUND \"OO\" LIPS, like whistling    SNIFF + 3-5x silently with just air for 5-7 seconds   You should feel a \"lighter\" rumble. These are still beneficial, even without your voice.   SNIFF + 3-5x short and easy sighs on \"Whooo\"   Short and lazy, not very impressive  SNIFF + 3-5x foghorn on single pitch for 5-7 seconds   C#3 or 139Hz  SNIFF + 3-5x Air > Voice > Air > Voice > Air > Voice   Very gently and quietly alternating without stopping the bubbles  C#3 or 139Hz  SNIFF + 3-5x sliding lower for 5-7 seconds  \"ding dong\" C#3 to A#2, 139 to 117Hz  Imagine an airplane landing softly  You may need a little more air blowing as you go lower  SNIFF + 3-5x sliding up and down like sirens  These should feel easy and lazy- not super high or super low  Let your breath stay flowy and bubbles stay quick and even      -- Double-check that your LIPS are ROUNDED into the water and " "bubbles are quick and even. You can always check that you feel the air flowing against your finger tips, then do it with the bubble.     ____________________________________________________     THROAT STRETCH  - This may very likely trigger coughing.   - The point of this exercise is to slowly stretch and relax tissue that is currently really tight, like the spot along the left-side.   1. Lay on your couch, with your head over the armrest.   2. Slowly tilt your head back, lifting your chin.   3. Only go as far as feels comfortable or doesn't trigger coughing. Hold, slowly breathing in and humming, 5x.   4. Then release. Next time, you can tilt your head back a little further.      ____________________________________________________     Seasonal/Dryness Strategies:  Around the fall season, there can be more dryness in the air and people start being more prone to catching colds. Some strategies to help keep yourself feeling better are:  1. Gargling a little bit of warm water mixed with salt and baking soda can be tremendously helpful. It's about 2 oz warm water mixed with 1/4-1/2 tsp mixture (half salt/half baking soda). Plan to do this every morning and evening before brushing your teeth. If you find it helpful, you can gargle and spit in the sink multiple times a day.   2. If you feel mucusy in your throat (or if you can't spit in the sink) then gargle plain water and swallow it.   3. A humidifier can be really helpful in the fall/winter, but you can just use a kitchen bowl filled with hot, steamy water on the table next to you or your nightstand.   4. You can wring out a washcloth with hot or cold water, holding it over your nose and mouth for a few minutes while breathing through it.    ____________________________________________________        BUZZY VOICE- ALL .6Hz or C#3  Practice throughout the day, whenever you get a chance. Your hands can rest of your belly, but not pull.   1. 10x \"hmmm\" for 5-7 " "seconds   Focus on \"nasal\" or \"twangy\" vibrations in your lips/nose on \"mmm\"  Belly muscles crunches upward and inward  2. Hmm...moomoomoo, moemoemoe, mememe, mamama, mymymy, maymaymay, mehmehmeh  Focus on \"nasal\" or \"twangy\" vibrations in your lips/nose on \"mmm\"  Belly muscles crunches upward and inward  3. Hmm...moon, mom, mean, man, mine, moan, men, main, morning, meaning, manners, mammals  You can start doing 2-3 words together   Focus on \"nasal\" or \"twangy\" vibrations in your lips/nose on \"mmm\"  Belly muscles crunches upward and inward  Chest stays tall  4. Sentences: Still chanted on 138.59Hz   Hmm...Mother knits many mittens.   Nobody knows Alejandra s name.              Magaly s measles made Magaly miserable.   Milking machines make much noise.    Mild-mannered Kylie  sunday Rodrigues.   Kelvin marched many, many miles.       Millions made money in the market in March.   My mother munches melon on Mondays.  Maybe my memory needs mending.   Genesis made more money.   Magaly made many muffins.   Merlin motored many miles.   Kelvin makes major messes.   My momma made meatloaf.   Music makes mellow moods.  [employer] favorite____ is ____; Nnnni like _____  HmmI'm going_____; HmmI need to_____. Starting with the buzzy sound each phrase.  Conversation, talking to yourself around the house, road signs/license plates, 20 questions, etc.       1st: Chanted on 1 pitch (138.59Hz or C#3) for now, like a monk or robot.   Focus on \"nasal\" or \"twangy\" vibrations in your lips/nose on \"mmm\"  Monitor your belly muscles with your hands, without pulling.  If you feel a tickle, SNIFF & Balloon until the tickle goes away.   ____________________________________________________     ANTI-TIGHT THROAT BREATHS   (4-5 breaths, several times a day)  These strategies are designed to help open and relax your throat, specifically whenever you're feeling tight or like it's hard to breathe.      SNIFF & BULLFROG: Imagine smelling something delicious, then " "blowing through ballooned cheeks to cool it down.   STRAW SIP & BULLFROG: Breathe in through rounded lips, like sipping air, then blow out with ballooned cheeks. You already use this to avoid or stop coughing, but it also helps loosen the throat.  TONGUE TUCK: Place the tip of your tongue against the roof of your mouth, creating a column in the center. Breathe in and feel cool & silent air sweep in along the sides of your tongue and down the back of your throat. Now keep your tongue tip in place and simply exhale.   This strategy will make your mouth dry, so stop to take a sip of water. Or, if you don't have any water nearby, you can pause to gently nibble the sides of your tongue with your molars. This tricks your brain into thinking you're eating and your mouth start to water, then move some saliva around with your tongue whenever needed, then return to your strategy.   The tongue tuck is the best strategy if you need to wear a face mask  MARK or \"Hand Breath\": Inhale with a BIG OPEN MOUTH through an ice-cream cone shaped hand. The inhale should be SILENT! Then blow out through a fist so your cheeks balloon.      Go very slowly so you don't get dizzy. Don't blow so hard that you see spots/diamonds/get dizzy. Take breaks as needed.      Thank you and have a great day!  Rosendo Marmolejo. (voice) MNikolaiS., CCC-SLP  Speech-Language Pathologist  [facility]  [phone]  [email or web address]  Pronouns: she/her/hers  "

## 2025-01-08 NOTE — PROGRESS NOTES
Derrick Colon is a 54 year old male who is being evaluated via a billable video visit.      Derrick has been notified and verbally consented to the following:   This video visit will be conducted between you and your provider.  Patient has opted to conduct today's video visit vs an in-person appointment.   Video visits are billed at different rates depending on your insurance coverage. Please reach out to your insurance provider with any questions.   If during the course of the call the provider feels the appointment is not appropriate, you will not be charged for this service.  Provider has received verbal consent for billable virtual visit from the patient? Yes  Will anyone else be joining your video visit? No    Call initiated at: 1000   Type of Visit Platform Used: [employer] Video  Location of provider: Home  Location of patient: [facility] VOICE CLINIC  PROGRESS REPORT (CPT 41898)    Patient: Aris Colon  Date of Service: 1/8/2025  Date of Last Service: 12/18/24  Number of Visits: 20/28 approved  Referring Physician: Dr. Kathy De Paz    Impressions from evaluation on 11/17/23 by Mark Dutta M.S., CCC-SLP:  Derrick Colon presents today with ongoing chronic cough which began in conjunction with workplace particle exposure, and with recent exacerbation including hemoptysis and emesis.  Laryngeal evaluation today was warranted given recent episodes of hemoptysis to ensure the patient was not recovering from hemorrhagic or ulcerative event.  This demonstrated no acute trauma which is fortunate, though diffuse change to the vocal folds and surrounding tissues from longstanding cough was seen consistent with previous evaluation if slightly worsened.  Cough response was aggressive to the application of oxymetazoline nasal spray, and guarding and light cough was noted to occur even prior to the administration, which underscores both the hypersensitivity as well as the stress base response to  "triggers that has developed over time.  Within the exam itself patient was able to attenuate cough through focused relaxation which bodes well for his ability to incorporate therapeutic strategies to reduce cough, though as we discussed full resolution of symptoms is often challenging due to their multifactorial nature especially in the case of irritant exposure.      SUBJECTIVE:  Since Derrick's last session, he reports the following:   He feels like he's doing a little better. He thinks that the nerve block injection eventually seemed to have a positive effect.   He thinks his coughing is down to an 8/10, rather than a 10/10, but he's still triggered by food smells, chemicals, humidity, and cold air.   He then uses his cough suppression techniques and feels like he's able to control the cough a little better.   He's started noticing when he's speaking lower vs higher and he's been monitoring his voice production more.  He's checking into possibly getting a job with [employer].     OBJECTIVE:  Patient Specific Goal Metrics:      9/10/2024    10:00 AM 11/25/2024    12:00 PM 1/8/2025    10:00 AM   Dysponia SLP Goals   How severe is your cough /throat clearing, if 0 is no cough at all and 10 is the worst cough? 7 7 8   How much does your cough/throat-clearing problem bother you?            Quite a bit Very Much Quite a bit   FO: 117Hz    THERAPEUTIC ACTIVITIES  Today Derrick participated in the following therapeutic activities:  Counseling and Education:  Asked questions about the nature of his symptoms, and I answered all of these thoroughly.  Only single coughs were observed intermittently throughout today's session, with less frequency as we progressed through SOVT bubble tasks. Three brief \"fits\" of coughing were mitigated with sniff and balloon strategy.    I provided Derrick with explanation and skilled instruction of:  Exercises to coordinate phonation with optimal flowing airstream and reduce phonatory impact. "   Semi-occluded vocal tract exercises with pursed lips and nearly full glass of water  were most facilitating  He progressed through the warm-up level of phonatory complexity  Instructed to use as a voice warm up, cool down, coordination of breath flow with phonation, and for tissue mobilization.  Specific modifications instructed included nasal inhalation to reduce cough trigger  Patient demonstrated good learning, but will need practice and additional reinforcement    Instruction of Home Practice:  A revised regimen for home practice was instructed.  I provided an AVS of important notes of today's therapeutic activities to facilitate practice.    ASSESSMENT/PLAN  Progress toward long-term goals:  Adequate but incomplete progress; please see above    Impressions:  IMPRESSIONS: chronic cough   Derrick had a productive session of therapy today, working on SOVT tasks.  He will continue to work on his exercises on a daily basis, and work on incorporating the techniques into his daily activities. Speech therapy continues to be medically necessary for Derrick to participate fully and engage in activities of daily living.     Plan:   I will see Derrick in 2 weeks and will plan to continue targeting specific pitches using cup bubbles, adding higher frequency task with reduced laryngeal strain. Continue reinforcing chanted C#3 spontaneous speech to more natural speaking inflection. Continue monitoring for less drastic abdominal contraction. Potentially reinstruct full cough avoidance strategies, or target flow phonation.     For home practice goals, see AVS.     TOTAL SERVICE TIME: 60 minutes  TREATMENT ([id number])    Milagros Landa (voice), M.S., CCC-SLP  Speech-Language Pathologist  [facility]  [phone]  [email or web address]  Pronouns: she/her/hers      *this report was created in part through the use of computerized dictation software, and though reviewed following completion, some typographic errors may  persist.  If there is confusion regarding any of this notes contents, please contact me for clarification    OBJECTIVE FINDINGS  PATIENT REPORTED MEASURES  Patient Supplied Answers To Last 2 VHI Questionnaires       No data to display                   Patient Supplied Answers To Last 2 CSI Questionnaires      12/18/2024     9:45 AM 1/8/2025    10:00 AM   Cough Severity Index (CSI)   My cough is worse when I lie down 2 2   My coughing problem causes me to restrict my personal and social life 4 3   I tend to avoid places because of my cough problem 4 4   I feel embarrassed because of my coughing problem 4 4   People ask, ''What's wrong?'' because I cough a lot 4 4   I run out of air when I cough 4 3   My coughing problem affects my voice 2 4   My coughing problem limits my physical activity 4 4   My coughing problem upsets me 4 4   People ask me if I am sick because I cough a lot 4 4   CSI Score 36  36       Patient-reported          Patient Supplied Answers To Last 2 EAT Questionnaires       No data to display                  Patient Supplied Answers to Dyspnea Index Questionnaire:       No data to display

## 2025-01-08 NOTE — LETTER
1/8/2025       RE: Aris Colon  [address] [place] [id number]     Dear Colleague,    Thank you for referring your patient, Aris Colon, to the [facility] VOICE CLINIC [place] [facility]. Please see a copy of my visit note below.    Derrick Colon is a 54 year old male who is being evaluated via a billable video visit.      Derrick has been notified and verbally consented to the following:   This video visit will be conducted between you and your provider.  Patient has opted to conduct today's video visit vs an in-person appointment.   Video visits are billed at different rates depending on your insurance coverage. Please reach out to your insurance provider with any questions.   If during the course of the call the provider feels the appointment is not appropriate, you will not be charged for this service.  Provider has received verbal consent for billable virtual visit from the patient? Yes  Will anyone else be joining your video visit? No    Call initiated at: 1000   Type of Visit Platform Used: [employer] Video  Location of provider: Home  Location of patient: [facility] VOICE CLINIC  PROGRESS REPORT (CPT 02927)    Patient: Aris Colon  Date of Service: 1/8/2025  Date of Last Service: 12/18/24  Number of Visits: 20/28 approved  Referring Physician: Dr. Kathy De Paz    Impressions from evaluation on 11/17/23 by Mark Dutta M.S., CCC-SLP:  Derrick Colon presents today with ongoing chronic cough which began in conjunction with workplace particle exposure, and with recent exacerbation including hemoptysis and emesis.  Laryngeal evaluation today was warranted given recent episodes of hemoptysis to ensure the patient was not recovering from hemorrhagic or ulcerative event.  This demonstrated no acute trauma which is fortunate, though diffuse change to the vocal folds and surrounding tissues from  longstanding cough was seen consistent with previous evaluation if slightly worsened.  Cough response was aggressive to the application of oxymetazoline nasal spray, and guarding and light cough was noted to occur even prior to the administration, which underscores both the hypersensitivity as well as the stress base response to triggers that has developed over time.  Within the exam itself patient was able to attenuate cough through focused relaxation which bodes well for his ability to incorporate therapeutic strategies to reduce cough, though as we discussed full resolution of symptoms is often challenging due to their multifactorial nature especially in the case of irritant exposure.      SUBJECTIVE:  Since Derrick's last session, he reports the following:   He feels like he's doing a little better. He thinks that the nerve block injection eventually seemed to have a positive effect.   He thinks his coughing is down to an 8/10, rather than a 10/10, but he's still triggered by food smells, chemicals, humidity, and cold air.   He then uses his cough suppression techniques and feels like he's able to control the cough a little better.   He's started noticing when he's speaking lower vs higher and he's been monitoring his voice production more.  He's checking into possibly getting a job with [employer].     OBJECTIVE:  Patient Specific Goal Metrics:      9/10/2024    10:00 AM 11/25/2024    12:00 PM 1/8/2025    10:00 AM   Dysponia SLP Goals   How severe is your cough /throat clearing, if 0 is no cough at all and 10 is the worst cough? 7 7 8   How much does your cough/throat-clearing problem bother you?            Quite a bit Very Much Quite a bit   FO: 117Hz    THERAPEUTIC ACTIVITIES  Today Derrick participated in the following therapeutic activities:  Counseling and Education:  Asked questions about the nature of his symptoms, and I answered all of these thoroughly.  Only single coughs were observed intermittently  "throughout today's session, with less frequency as we progressed through SOVT bubble tasks. Three brief \"fits\" of coughing were mitigated with sniff and balloon strategy.    I provided Derrick with explanation and skilled instruction of:  Exercises to coordinate phonation with optimal flowing airstream and reduce phonatory impact.   Semi-occluded vocal tract exercises with pursed lips and nearly full glass of water  were most facilitating  He progressed through the warm-up level of phonatory complexity  Instructed to use as a voice warm up, cool down, coordination of breath flow with phonation, and for tissue mobilization.  Specific modifications instructed included nasal inhalation to reduce cough trigger  Patient demonstrated good learning, but will need practice and additional reinforcement    Instruction of Home Practice:  A revised regimen for home practice was instructed.  I provided an AVS of important notes of today's therapeutic activities to facilitate practice.    ASSESSMENT/PLAN  Progress toward long-term goals:  Adequate but incomplete progress; please see above    Impressions:  IMPRESSIONS: chronic cough   Derrick had a productive session of therapy today, working on SOVT tasks.  He will continue to work on his exercises on a daily basis, and work on incorporating the techniques into his daily activities. Speech therapy continues to be medically necessary for Derrick to participate fully and engage in activities of daily living.     Plan:   I will see Derrick in 2 weeks and will plan to continue targeting specific pitches using cup bubbles, adding higher frequency task with reduced laryngeal strain. Continue reinforcing chanted C#3 spontaneous speech to more natural speaking inflection. Continue monitoring for less drastic abdominal contraction. Potentially reinstruct full cough avoidance strategies, or target flow phonation.     For home practice goals, see AVS.     TOTAL SERVICE TIME: 60 minutes  TREATMENT " (63246)    Milagros Landa (voice), M.S., CCC-SLP  Speech-Language Pathologist  [facility]  [phone]  [email or web address]  Pronouns: she/her/hers      *this report was created in part through the use of computerized dictation software, and though reviewed following completion, some typographic errors may persist.  If there is confusion regarding any of this notes contents, please contact me for clarification    OBJECTIVE FINDINGS  PATIENT REPORTED MEASURES  Patient Supplied Answers To Last 2 VHI Questionnaires       No data to display                   Patient Supplied Answers To Last 2 CSI Questionnaires      12/18/2024     9:45 AM 1/8/2025    10:00 AM   Cough Severity Index (CSI)   My cough is worse when I lie down 2 2   My coughing problem causes me to restrict my personal and social life 4 3   I tend to avoid places because of my cough problem 4 4   I feel embarrassed because of my coughing problem 4 4   People ask, ''What's wrong?'' because I cough a lot 4 4   I run out of air when I cough 4 3   My coughing problem affects my voice 2 4   My coughing problem limits my physical activity 4 4   My coughing problem upsets me 4 4   People ask me if I am sick because I cough a lot 4 4   CSI Score 36  36       Patient-reported          Patient Supplied Answers To Last 2 EAT Questionnaires       No data to display                  Patient Supplied Answers to Dyspnea Index Questionnaire:       No data to display                 Again, thank you for allowing me to participate in the care of your patient.      Sincerely,    Ora Shepard, SLP

## 2025-01-21 ENCOUNTER — TELEPHONE (OUTPATIENT)
Dept: OTOLARYNGOLOGY | Facility: CLINIC | Age: 55
End: 2025-01-21

## 2025-01-21 ENCOUNTER — VIRTUAL VISIT (OUTPATIENT)
Dept: OTOLARYNGOLOGY | Facility: CLINIC | Age: 55
End: 2025-01-21

## 2025-01-21 VITALS — WEIGHT: 210 LBS | HEIGHT: 70 IN | BODY MASS INDEX: 30.06 KG/M2

## 2025-01-21 DIAGNOSIS — R05.3 CHRONIC COUGH: Primary | ICD-10-CM

## 2025-01-21 PROCEDURE — 98004 SYNCH AUDIO-VIDEO EST SF 10: CPT | Performed by: OTOLARYNGOLOGY

## 2025-01-21 NOTE — TELEPHONE ENCOUNTER
Lvm to cancel 1/22 appt with Ora as she will not be available for visit at noon. Gave call center number for questions or if desiring to look at calendar prior to provider's leave (might have availability virtually on 2/13 or 2/14).   Kinza Uriostegui on 1/21/2025 at 8:28 AM

## 2025-01-21 NOTE — PROGRESS NOTES
Derrick is a 54 year old who is being evaluated via a billable video visit.    How would you like to obtain your AVS? MyChart  If the video visit is dropped, the invitation should be resent by: Text to cell phone: [phone]  Will anyone else be joining your video visit? No    Video-Visit Details    Type of service:  Video Visit   Video End Time: 10:30A  Originating Location (pt. Location): Home    Distant Location (provider location):  On-site  Platform used for Video Visit: Cher        [facility] Voice [facility]   at the [facility]   Otolaryngology Clinic     Patient: Aris Colon    MRN: [id number]    : 1970    Age/Gender: 54 year old male  Date of Service: 2025  Rendering Provider:   Kathy De Paz MD         Impression & Plan     IMPRESSION: Mr. 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 A few subpleural nodules in the right middle lobe of the lung measuring up to 3 mm are unchanged as is a 3 mm left lower lobe nodule. These are probably benign.  Ascending thoracic aortic aneurysm measuring 46 mm unchanged. Patient subglottis  - no ACEI   - allergy triggers and work up: none  - pulmonary triggers and work up: working with pulm, had chest CT and bronchoscopy, has inhalers and tessalon perles  - GI triggers and work up: wakes up coughing at night, denies feeling reflux, vomits from coughing  - ENT triggers and work up: associated with fragrances, heat, humidity, and moisture  - scope shows postcricoid edema  - symptoms likely due to irritable larynx syndrome and laryngopharyngeal reflux, does have  coughing at night and snoring so need to rule out sleep apnea  - symptoms today 7/9/24 not better, though he has been working with Ora, can't live his life, constantly coughing and vomiting, he is very distraught, SLN block did not work, made things worse  - reviewed SLP note 6/13/24 and [facility] note 6/11/24, pulm note 11/13/23 - on inhaler, reviewed Xray Video Swallow Exam at swallow rounds 9/13/23 - normal  - discussed starting neuromodulation vs botox   - will start with neuromodulation, reports trying gabapentin before so will start with lyrica instead, will also put in the request for botox   - symptoms 9/5/2024 are worsened given the current upheavel with his botox claim, had to go to court  - does not have side effects from lyrica, discussed maximizing the dosing by increasing to 2 pills in the evening and continuing with voice therapy  - s/p botox injection 1.0 on the RIGHT, some spilled on the left on 12/6/24  - symptoms 01/21/2025 are improved by 20%, no coughing or choking with liquids, no voice loss  - discussed since he is better would recommend repeating trial of botox with higher dose  - patient would prefer via EMG   Plan  - repeat botox via EMG with 1.25u b/l  - discuss lyrica at next appt     RETURN VISIT: 12 weeks from last botox    Chief Complaint   Cough    Interval History   HISTORY OF PRESENT ILLNESS: Mr. Colon is a 54 year old male is being followed for cough.   he was initially seen on 7/9/24. Please refer to this note for full history.    Today, he presents for follow up. he reports:  Doing better    Date of injection   location/amount injected  symptoms   12/6/24 1.0 on the RIGHT, some spilled on the left via VT2 Improved by 20% no side effects         PAST MEDICAL HISTORY: No past medical history on file.    PAST SURGICAL HISTORY:   Past Surgical History:   Procedure Laterality Date    LARYNGOSCOPY, FLEXIBLE WITH INJECTION N/A 12/6/2024    Procedure: LARYNGOSCOPY, FLEXIBLE WITH  INJECTION with botox;  Surgeon: Kathy De Paz MD;  Location: [facility] OR       CURRENT MEDICATIONS:   Current Outpatient Medications:     albuterol (PROAIR HFA/PROVENTIL HFA/VENTOLIN HFA) 108 (90 Base) MCG/ACT inhaler, 2 puffs every 4 hours as needed, Disp: , Rfl:     benzonatate (TESSALON) 100 MG capsule, Take 1 capsule (100 mg) by mouth 3 times daily as needed for cough, Disp: 30 capsule, Rfl: 4    FLUoxetine (PROZAC) 20 MG capsule, , Disp: , Rfl:     fluticasone-vilanterol (BREO ELLIPTA) 100-25 MCG/ACT inhaler, , Disp: , Rfl:     hydrOXYzine (ATARAX) 10 MG tablet, , Disp: , Rfl:     LANsoprazole (PREVACID) 30 MG DR capsule, Take 30 mg by mouth, Disp: , Rfl:     omeprazole (PRILOSEC) 20 MG DR capsule, TAKE 2 CAPSULES(40 MG) BY MOUTH TWICE DAILY, Disp: , Rfl:     pantoprazole (PROTONIX) 40 MG EC tablet, Take 1 tablet (40 mg) by mouth 2 times daily, Disp: 180 tablet, Rfl: 2    pregabalin (LYRICA) 75 MG capsule, 1 tab in the morning, 1 tab midday, 2 tabs in the evening, for 1 month, Disp: 120 capsule, Rfl: 2    pregabalin (LYRICA) 75 MG capsule, Take 1 capsule (75 mg) by mouth daily for 2 days, THEN 1 capsule (75 mg) 2 times daily for 2 days, THEN 1 capsule (75 mg) 3 times daily for 42 days., Disp: 132 capsule, Rfl: 0    sildenafil (REVATIO) 20 MG tablet, TAKE 1-5 TABLETS BY MOUTH 30 MINS TO 4 HOURS BEFORE SEXUAL ACTIVITY, Disp: , Rfl:     tiotropium (SPIRIVA RESPIMAT) 2.5 MCG/ACT inhaler, Inhale 2 puffs into the lungs, Disp: , Rfl:     Current Facility-Administered Medications:     botulinum toxin type A (BOTOX) 100 units injection 6 Units, 6 Units, Intramuscular, Q90 Days, Kathy De Paz MD    ALLERGIES: Patient has no known allergies.    SOCIAL HISTORY:    Social History     Socioeconomic History    Marital status:      Spouse name: Not on file    Number of children: Not on file    Years of education: Not on file    Highest education level: Not on file   Occupational History    Not on file   Tobacco Use     Smoking status: Never     Passive exposure: Never    Smokeless tobacco: Never   Vaping Use    Vaping status: Never Used   Substance and Sexual Activity    Alcohol use: Not on file    Drug use: Not on file    Sexual activity: Not on file   Other Topics Concern    Not on file   Social History Narrative    Not on file     Social Drivers of Health     Financial Resource Strain: High Risk (1/1/2022)    Received from [facility] "[employer] [facility] [employer] [facility], [facility]    Financial Resource Strain     Difficulty of Paying Living Expenses: Not on file     Difficulty of Paying Living Expenses: Not on file   Food Insecurity: Not on file   Transportation Needs: Not on file   Physical Activity: Not on file   Stress: Not on file   Social Connections: Unknown (1/1/2022)    [facility], [facility] [employer] [facility]    Social Connections     Frequency of Communication with Friends and Family: Not on file   Interpersonal Safety: Low Risk  (12/6/2024)    Interpersonal Safety     Do you feel physically and emotionally safe where you currently live?: Yes     Within the past 12 months, have you been hit, slapped, kicked or otherwise physically hurt by someone?: No     Within the past 12 months, have you been humiliated or emotionally abused in other ways by your partner or ex-partner?: No   Housing Stability: Not on file         FAMILY HISTORY: No family history on file.   Non-contributory for problems with anesthesia    REVIEW OF SYSTEMS:   The patient was asked a 14 point review of systems regarding constitutional symptoms, eye symptoms, ears, nose, mouth, throat symptoms, cardiovascular symptoms, respiratory symptoms, gastrointestinal symptoms, genitourinary symptoms, musculoskeletal symptoms, integumentary symptoms, neurological symptoms, psychiatric symptoms, endocrine symptoms, hematologic/lymphatic symptoms, and allergic/  immunologic symptoms.   The pertinent factors have been included in the HPI and below.  Patient Supplied Answers to Review of Systems      7/9/2024     8:08 AM   [facility] ROS   Constitutional Weight gain           Physical Examination   The patient underwent a physical examination as described below. The pertinent positive and negative findings are summarized after the description of the examination.  Constitutional: The patient's developmental and nutritional status was assessed. The patient's voice quality was assessed.  Head and Face: The head and face were inspected for deformities. Facial muscle strength was assessed bilaterally.  Eyes: Extraocular movements and primary gaze alignment were assessed.  Ears, Nose, Mouth and Throat: The ears and nose were examined for deformities.The lips were examined for abnormalities.   Neck: The neck was visualized for abnormal neck masses  Respiratory: The nature of the breathing was observed.  Extremities: The hand extremities were examined for any clubbing or cyanosis.  Skin: The skin was examined for inflammatory or neoplastic conditions.  Neurologic: The patient's orientation, mood, and affect were noted. The cranial nerve  functions were examined.  Other pertinent positive and negative findings on physical examination:   Breathing comfortably on room air, no stridor with deep inspiration  Some coughing throughout the visit after an initial period of no coughing    All other physical examination findings were within normal limits and noncontributory       Review of Relevant Clinical Data   I personally reviewed:  Notes:   09/10/2024 JOSEPH Landa  SUBJECTIVE:  Since Derrick's last session, he reports the following:   He went to court on 9/4/24 and will be waiting to see if the  determines his cough is work-related or not, and in the meantime all additional medical services are on hold/denied.   He thinks his cough has been a little bit better the past two weeks.   He's  been able to try controlling the cough better with his strategies, but has also still been avoiding social activities/leaving his house to stay away from triggers.   Dr. De Paz increased his Lyrica and recommended ongoing speech pathology.   His eyes have been watering a little more and he thinks he has a little bit of a cold.     10/21/2024 JOSEPH Landa  SUBJECTIVE:  Since Derrick's last session, he reports the following:   He's continued doing all of his exercises and breathing strategies  He feels like his cough had improved for a little while, but now has been picking back up with the colder weather.   The weather jumping back up to 80s yesterday and today caused more sweating, which tends to then trigger more throat tightness and difficulty breathing.   Both hot and cold air and strong scents can trigger not only coughing, but the difficulty breathing.   He thinks his voice has improved some and doesn't trigger as much coughing, but he's still frustrated when the cough flares back up.   There hasn't been any verdict yet on his case and additional medical coverage.   He's switched to a new location for pool, only on Thursday evenings, due to being able to carpool and the new place doesn't have a food menu so there are fewer smell triggers. There's also a back room where they've been able to play several times, away from the main room and potential triggers.     11/25/2024 JOSEPH Landa  SUBJECTIVE:  Since Derrick's last session, he reports the following:   He couldn't stop coughing during the video visit check-in process today.   He won his court case and was approved for 10 more speech visits.   Botox injection is scheduled for Dec 6th with Dr. De Paz.   He's continued going to pool in the past month, avoiding the french where there are more food smells.   When he has a coughing fit, he uses the sniff and balloon to help reduce the severity or duration.   He has been searching for a job, but he has a lot of  "restrictions (lifting, air quality) which makes it very difficult. 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   The recent change in weather and colder air has worsened his cough lately.   He occasionally tries to challenge himself and see if his cough is any better, but wasn't able to sleep without being inclined last night.     12/18/2024 JOSEPH Landa  SUBJECTIVE:  Since Derrick's last session, he reports the following:   He had botox injections done on December 6th with Dr. De Paz, but feels they didn't work. He feels like the same triggers are still an issue for triggering his cough. Smells, cold air, fumes still trigger his coughing.   He threw up due to coughing 3x since his injection on December 6th.  He tries to use his sip and balloon technique, but sometimes feels like the inhalation makes the cough worse.     01/08/2025 JOSEPH Landa  SUBJECTIVE:  Since Derrick's last session, he reports the following:   He feels like he's doing a little better. He thinks that the nerve block injection eventually seemed to have a positive effect.   He thinks his coughing is down to an 8/10, rather than a 10/10, but he's still triggered by food smells, chemicals, humidity, and cold air.   He then uses his cough suppression techniques and feels like he's able to control the cough a little better.   He's started noticing when he's speaking lower vs higher and he's been monitoring his voice production more.  He's checking into possibly getting a job with [employer].     Radiology:   01/12/2024 XR Chest 2 Views  Impression  The heart is normal in size. Minimal linear atelectasis or scarring in the left lung base. The lungs otherwise appear clear. No consolidation or pleural effusion. Mild degenerative changes of the spine with mild scoliotic curvature.    Labs:  No results found for: \"TSH\"  No results found for: \"NA\", \"CO2\", \"BUN\", \"CREAT\", \"GLUCOSE\", " "\"PHOS\"  No results found for: \"WBC\", \"HGB\", \"HCT\", \"MCV\", \"PLT\"  No results found for: \"PT\", \"PTT\", \"INR\"  No results found for: \"MAREN\"  No components found for: \"RHEUMATOIDFACTOR\", \"RF\"  No results found for: \"CRP\"  No components found for: \"CKTOT\", \"URICACID\"  No components found for: \"C3\", \"C4\", \"DSDNAAB\", \"NDNAABIFA\"  No results found for: \"MPOAB\"    Patient reported Quality of Life (QOL) Measures   Patient Supplied Answers To VHI Questionnaire       No data to display                  Patient Supplied Answers To EAT Questionnaire       No data to display                  Patient Supplied Answers To CSI Questionnaire      1/8/2025    10:00 AM   Cough Severity Index (CSI)   My cough is worse when I lie down 2   My coughing problem causes me to restrict my personal and social life 3   I tend to avoid places because of my cough problem 4   I feel embarrassed because of my coughing problem 4   People ask, ''What's wrong?'' because I cough a lot 4   I run out of air when I cough 3   My coughing problem affects my voice 4   My coughing problem limits my physical activity 4   My coughing problem upsets me 4   People ask me if I am sick because I cough a lot 4   CSI Score 36         Patient Supplied Answers to Dyspnea Index Questionnaire:       No data to display                      Kathy De Paz MD    Laryngology    [facility]  Department of  Otolaryngology - Head and Neck Surgery  [facility]  [address] [place] [address]  Appointment line: [phone]  Fax: [phone]    15 minutes spent on the date of the encounter doing chart review, patient visit, and documentation     "

## 2025-01-21 NOTE — LETTER
2025       RE: Aris Colon  [address] [place] [id number]     Dear Colleague,    Thank you for referring your patient, Aris Colon, to the [facility] EAR NOSE AND THROAT CLINIC [place] at [facility]. Please see a copy of my visit note below.        [facility]   at the [facility]   Otolaryngology Clinic     Patient: Aris Colon    MRN: [id number]    : 1970    Age/Gender: 54 year old male  Date of Service: 2025  Rendering Provider:   Kathy De Paz MD         Impression & Plan     IMPRESSION: Mr. 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 A few subpleural nodules in the right middle lobe of the lung measuring up to 3 mm are unchanged as is a 3 mm left lower lobe nodule. These are probably benign.  Ascending thoracic aortic aneurysm measuring 46 mm unchanged. Patient subglottis  - no ACEI   - allergy triggers and work up: none  - pulmonary triggers and work up: working with pulm, had chest CT and bronchoscopy, has inhalers and tessalon perles  - GI triggers and work up: wakes up coughing at night, denies feeling reflux, vomits from coughing  - ENT triggers and work up: associated with fragrances, heat, humidity, and moisture  - scope shows postcricoid edema  - symptoms likely due to irritable larynx syndrome and laryngopharyngeal reflux, does have coughing at night and snoring so need to rule out sleep apnea  - symptoms today 24 not better, though he has been working with [employer], can't  live his life, constantly coughing and vomiting, he is very distraught, SLN block did not work, made things worse  - reviewed SLP note 6/13/24 and [facility] note 6/11/24, pulm note 11/13/23 - on inhaler, reviewed Xray Video Swallow Exam at swallow rounds 9/13/23 - normal  - discussed starting neuromodulation vs botox   - will start with neuromodulation, reports trying gabapentin before so will start with lyrica instead, will also put in the request for botox   - symptoms 9/5/2024 are worsened given the current upheavel with his botox claim, had to go to court  - does not have side effects from lyrica, discussed maximizing the dosing by increasing to 2 pills in the evening and continuing with voice therapy  - s/p botox injection 1.0 on the RIGHT, some spilled on the left on 12/6/24  - symptoms 01/21/2025 are improved by 20%, no coughing or choking with liquids, no voice loss  - discussed since he is better would recommend repeating trial of botox with higher dose  - patient would prefer via EMG   Plan  - repeat botox via EMG with 1.25u b/l  - discuss lyrica at next appt     RETURN VISIT: 12 weeks from last botox    Chief Complaint   Cough    Interval History   HISTORY OF PRESENT ILLNESS: Mr. Colon is a 54 year old male is being followed for cough.   he was initially seen on 7/9/24. Please refer to this note for full history.    Today, he presents for follow up. he reports:  Doing better    Date of injection   location/amount injected  symptoms   12/6/24 1.0 on the RIGHT, some spilled on the left via VT2 Improved by 20% no side effects            PAST MEDICAL HISTORY: No past medical history on file.    PAST SURGICAL HISTORY:   Past Surgical History:   Procedure Laterality Date     LARYNGOSCOPY, FLEXIBLE WITH INJECTION N/A 12/6/2024    Procedure: LARYNGOSCOPY, FLEXIBLE WITH INJECTION with botox;  Surgeon: Kathy De Paz MD;  Location: [facility] OR       CURRENT MEDICATIONS:   Current Outpatient Medications:       albuterol (PROAIR HFA/PROVENTIL HFA/VENTOLIN HFA) 108 (90 Base) MCG/ACT inhaler, 2 puffs every 4 hours as needed, Disp: , Rfl:      benzonatate (TESSALON) 100 MG capsule, Take 1 capsule (100 mg) by mouth 3 times daily as needed for cough, Disp: 30 capsule, Rfl: 4     FLUoxetine (PROZAC) 20 MG capsule, , Disp: , Rfl:      fluticasone-vilanterol (BREO ELLIPTA) 100-25 MCG/ACT inhaler, , Disp: , Rfl:      hydrOXYzine (ATARAX) 10 MG tablet, , Disp: , Rfl:      LANsoprazole (PREVACID) 30 MG DR capsule, Take 30 mg by mouth, Disp: , Rfl:      omeprazole (PRILOSEC) 20 MG DR capsule, TAKE 2 CAPSULES(40 MG) BY MOUTH TWICE DAILY, Disp: , Rfl:      pantoprazole (PROTONIX) 40 MG EC tablet, Take 1 tablet (40 mg) by mouth 2 times daily, Disp: 180 tablet, Rfl: 2     pregabalin (LYRICA) 75 MG capsule, 1 tab in the morning, 1 tab midday, 2 tabs in the evening, for 1 month, Disp: 120 capsule, Rfl: 2     pregabalin (LYRICA) 75 MG capsule, Take 1 capsule (75 mg) by mouth daily for 2 days, THEN 1 capsule (75 mg) 2 times daily for 2 days, THEN 1 capsule (75 mg) 3 times daily for 42 days., Disp: 132 capsule, Rfl: 0     sildenafil (REVATIO) 20 MG tablet, TAKE 1-5 TABLETS BY MOUTH 30 MINS TO 4 HOURS BEFORE SEXUAL ACTIVITY, Disp: , Rfl:      tiotropium (SPIRIVA RESPIMAT) 2.5 MCG/ACT inhaler, Inhale 2 puffs into the lungs, Disp: , Rfl:     Current Facility-Administered Medications:      botulinum toxin type A (BOTOX) 100 units injection 6 Units, 6 Units, Intramuscular, Q90 Days, Kathy De Paz MD    ALLERGIES: Patient has no known allergies.    SOCIAL HISTORY:    Social History     Socioeconomic History     Marital status:      Spouse name: Not on file     Number of children: Not on file     Years of education: Not on file     Highest education level: Not on file   Occupational History     Not on file   Tobacco Use     Smoking status: Never     Passive exposure: Never     Smokeless tobacco: Never   Vaping Use     Vaping status: Never  Used   Substance and Sexual Activity     Alcohol use: Not on file     Drug use: Not on file     Sexual activity: Not on file   Other Topics Concern     Not on file   Social History Narrative     Not on file     Social Drivers of Health     Financial Resource Strain: High Risk (1/1/2022)    [facility]    Financial Resource Strain      Difficulty of Paying Living Expenses: Not on file      Difficulty of Paying Living Expenses: Not on file   Food Insecurity: Not on file   Transportation Needs: Not on file   Physical Activity: Not on file   Stress: Not on file   Social Connections: Unknown (1/1/2022)    [facility]    Social Connections      Frequency of Communication with Friends and Family: Not on file   Interpersonal Safety: Low Risk  (12/6/2024)    Interpersonal Safety      Do you feel physically and emotionally safe where you currently live?: Yes      Within the past 12 months, have you been hit, slapped, kicked or otherwise physically hurt by someone?: No      Within the past 12 months, have you been humiliated or emotionally abused in other ways by your partner or ex-partner?: No   Housing Stability: Not on file         FAMILY HISTORY: No family history on file.   Non-contributory for problems with anesthesia    REVIEW OF SYSTEMS:   The patient was asked a 14 point review of systems regarding constitutional symptoms, eye symptoms, ears, nose, mouth, throat symptoms, cardiovascular symptoms, respiratory symptoms, gastrointestinal symptoms, genitourinary symptoms, musculoskeletal symptoms, integumentary symptoms, neurological symptoms, psychiatric symptoms, endocrine symptoms, hematologic/lymphatic symptoms, and allergic/ immunologic symptoms.   The pertinent factors have been included in the HPI and below.  Patient Supplied  Answers to Review of Systems      7/9/2024     8:08 AM    ENT ROS   Constitutional Weight gain       Physical Examination   The patient underwent a physical examination as described below. The pertinent positive and negative findings are summarized after the description of the examination.  Constitutional: The patient's developmental and nutritional status was assessed. The patient's voice quality was assessed.  Head and Face: The head and face were inspected for deformities. Facial muscle strength was assessed bilaterally.  Eyes: Extraocular movements and primary gaze alignment were assessed.  Ears, Nose, Mouth and Throat: The ears and nose were examined for deformities.The lips were examined for abnormalities.   Neck: The neck was visualized for abnormal neck masses  Respiratory: The nature of the breathing was observed.  Extremities: The hand extremities were examined for any clubbing or cyanosis.  Skin: The skin was examined for inflammatory or neoplastic conditions.  Neurologic: The patient's orientation, mood, and affect were noted. The cranial nerve  functions were examined.  Other pertinent positive and negative findings on physical examination:   Breathing comfortably on room air, no stridor with deep inspiration  Much improved coughing though still some present       All other physical examination findings were within normal limits and noncontributory     Review of Relevant Clinical Data   I personally reviewed:  Notes:   09/10/2024 JOSEPH Landa  SUBJECTIVE:  Since Derrick's last session, he reports the following:   He went to court on 9/4/24 and will be waiting to see if the  determines his cough is work-related or not, and in the meantime all additional medical services are on hold/denied.   He thinks his cough has been a little bit better the past two weeks.   He's been able to try controlling the cough better with his strategies, but has also still been avoiding social activities/leaving his  house to stay away from triggers.   Dr. De Paz increased his Lyrica and recommended ongoing speech pathology.   His eyes have been watering a little more and he thinks he has a little bit of a cold.     10/21/2024 JOSEPH Landa  SUBJECTIVE:  Since Derrick's last session, he reports the following:   He's continued doing all of his exercises and breathing strategies  He feels like his cough had improved for a little while, but now has been picking back up with the colder weather.   The weather jumping back up to 80s yesterday and today caused more sweating, which tends to then trigger more throat tightness and difficulty breathing.   Both hot and cold air and strong scents can trigger not only coughing, but the difficulty breathing.   He thinks his voice has improved some and doesn't trigger as much coughing, but he's still frustrated when the cough flares back up.   There hasn't been any verdict yet on his case and additional medical coverage.   He's switched to a new location for pool, only on Thursday evenings, due to being able to carpool and the new place doesn't have a food menu so there are fewer smell triggers. There's also a back room where they've been able to play several times, away from the main room and potential triggers.     11/25/2024 JOSEPH Landa  SUBJECTIVE:  Since Derrick's last session, he reports the following:   He couldn't stop coughing during the video visit check-in process today.   He won his court case and was approved for 10 more speech visits.   Botox injection is scheduled for Dec 6th with Dr. De Paz.   He's continued going to pool in the past month, avoiding the french where there are more food smells.   When he has a coughing fit, he uses the sniff and balloon to help reduce the severity or duration.   He has been searching for a job, but he has a lot of restrictions (lifting, air quality) which makes it very difficult. He notes that a computer job working from home or an office job, but  "will need to monitor his voice use.   The recent change in weather and colder air has worsened his cough lately.   He occasionally tries to challenge himself and see if his cough is any better, but wasn't able to sleep without being inclined last night.     12/18/2024 JOSEPH Landa  SUBJECTIVE:  Since Derrick's last session, he reports the following:   He had botox injections done on December 6th with Dr. De Paz, but feels they didn't work. He feels like the same triggers are still an issue for triggering his cough. Smells, cold air, fumes still trigger his coughing.   He threw up due to coughing 3x since his injection on December 6th.  He tries to use his sip and balloon technique, but sometimes feels like the inhalation makes the cough worse.     01/08/2025 JOSEPH Landa  SUBJECTIVE:  Since Derrick's last session, he reports the following:   He feels like he's doing a little better. He thinks that the nerve block injection eventually seemed to have a positive effect.   He thinks his coughing is down to an 8/10, rather than a 10/10, but he's still triggered by food smells, chemicals, humidity, and cold air.   He then uses his cough suppression techniques and feels like he's able to control the cough a little better.   He's started noticing when he's speaking lower vs higher and he's been monitoring his voice production more.  He's checking into possibly getting a job with [employer].     Radiology:   01/12/2024 XR Chest 2 Views  Impression  The heart is normal in size. Minimal linear atelectasis or scarring in the left lung base. The lungs otherwise appear clear. No consolidation or pleural effusion. Mild degenerative changes of the spine with mild scoliotic curvature.    Labs:  No results found for: \"TSH\"  No results found for: \"NA\", \"CO2\", \"BUN\", \"CREAT\", \"GLUCOSE\", \"PHOS\"  No results found for: \"WBC\", \"HGB\", \"HCT\", \"MCV\", \"PLT\"  No results found for: \"PT\", \"PTT\", \"INR\"  No results found for: \"MAREN\"  No " "components found for: \"RHEUMATOIDFACTOR\", \"RF\"  No results found for: \"CRP\"  No components found for: \"CKTOT\", \"URICACID\"  No components found for: \"C3\", \"C4\", \"DSDNAAB\", \"NDNAABIFA\"  No results found for: \"MPOAB\"    Patient reported Quality of Life (QOL) Measures   Patient Supplied Answers To VHI Questionnaire       No data to display                  Patient Supplied Answers To EAT Questionnaire       No data to display                  Patient Supplied Answers To CSI Questionnaire      1/8/2025    10:00 AM   Cough Severity Index (CSI)   My cough is worse when I lie down 2   My coughing problem causes me to restrict my personal and social life 3   I tend to avoid places because of my cough problem 4   I feel embarrassed because of my coughing problem 4   People ask, ''What's wrong?'' because I cough a lot 4   I run out of air when I cough 3   My coughing problem affects my voice 4   My coughing problem limits my physical activity 4   My coughing problem upsets me 4   People ask me if I am sick because I cough a lot 4   CSI Score 36         Patient Supplied Answers to Dyspnea Index Questionnaire:       No data to display                      Kathy De Paz MD    Laryngology    [facility]  Department of  Otolaryngology - Head and Neck Surgery  [facility] [place] [address]  Appointment line: [phone]  Fax: [phone]    15 minutes spent on the date of the encounter doing chart review, patient visit, and documentation       Again, thank you for allowing me to participate in the care of your patient.      Sincerely,    Kathy De Paz MD    "

## 2025-02-11 ENCOUNTER — VIRTUAL VISIT (OUTPATIENT)
Dept: OTOLARYNGOLOGY | Facility: CLINIC | Age: 55
End: 2025-02-11
Payer: OTHER MISCELLANEOUS

## 2025-02-11 DIAGNOSIS — R05.3 CHRONIC COUGH: Primary | ICD-10-CM

## 2025-02-11 NOTE — LETTER
2/11/2025       RE: Aris Colon  [address] [place] [id number]     Dear Colleague,    Thank you for referring your patient, Aris Colon, to the [facility] VOICE CLINIC [place] at [facility]. Please see a copy of my visit note below.    Derrick Colon is a 54 year old male who is being evaluated via a billable video visit.      Derrick has been notified and verbally consented to the following:   This video visit will be conducted between you and your provider.  Patient has opted to conduct today's video visit vs an in-person appointment.   Video visits are billed at different rates depending on your insurance coverage. Please reach out to your insurance provider with any questions.   If during the course of the call the provider feels the appointment is not appropriate, you will not be charged for this service.  Provider has received verbal consent for billable virtual visit from the patient? Yes  Will anyone else be joining your video visit? No    Call initiated at: 1600   Type of Visit Platform Used: [employer] Video  Location of provider: Home  Location of patient: [facility] VOICE CLINIC  PROGRESS REPORT (CPT 23884)    Patient: Aris Colon  Date of Service: 2/11/2025  Date of Last Service: 1/8/25  Number of Visits: 21/28 approved  Referring Physician: Dr. Kathy De Paz    Impressions from evaluation on 11/17/23 by Mark Dutta M.S., CCC-SLP:  Derrick Colon presents today with ongoing chronic cough which began in conjunction with workplace particle exposure, and with recent exacerbation including hemoptysis and emesis.  Laryngeal evaluation today was warranted given recent episodes of hemoptysis to ensure the patient was not recovering from hemorrhagic or ulcerative event.  This demonstrated no acute trauma which is fortunate, though diffuse change to the vocal folds and surrounding tissues from  longstanding cough was seen consistent with previous evaluation if slightly worsened.  Cough response was aggressive to the application of oxymetazoline nasal spray, and guarding and light cough was noted to occur even prior to the administration, which underscores both the hypersensitivity as well as the stress base response to triggers that has developed over time.  Within the exam itself patient was able to attenuate cough through focused relaxation which bodes well for his ability to incorporate therapeutic strategies to reduce cough, though as we discussed full resolution of symptoms is often challenging due to their multifactorial nature especially in the case of irritant exposure.      SUBJECTIVE:  Since Derrick's last session, he reports the following:   He's been coughing a lot more than he has in a long time and is back to vomiting because of the coughing.   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 He has the option of standing if he wants to. He's working Wed through Friday from 10-5. He only talks if he needs to, if he has a question. He doesn't think there's anything bad in the air quality, though he's been mostly focused on learning the new processes.   He expressed significant frustration that his coughing has increased again and he states he's tired and done with coughing.     OBJECTIVE:  Patient Specific Goal Metrics:      9/10/2024    10:00 AM 11/25/2024    12:00 PM 1/8/2025    10:00 AM   Dysponia SLP Goals   How severe is your cough /throat clearing, if 0 is no cough at all and 10 is the worst cough? 7 7 8   How much does your cough/throat-clearing problem bother you?            Quite a bit Very Much Quite a bit       THERAPEUTIC ACTIVITIES  Today Derrick participated in the following therapeutic activities:  Counseling and Education:  Asked questions about the nature of his symptoms, and I answered all of these thoroughly.    I provided Derrick with explanation and  "skilled instruction of:  Resonant Voice Therapy (RVT) exercises to promote forward locus of resonance and optimized pattern of laryngeal adduction  Easy descending glide on /m/ utilized in conjunction with relaxed jaw, tongue, and lightly closed lips to facilitate forward resonant sound  Use of the carrier phrase \"mmhmm\" instructed to promote generalization to everyday speech  Word, phrase and conversational level exercises featuring nasal continuant loaded stimuli  Special care was taken to maintain sense of open pharyngeal space for broadened resonance in conjunction with forward resonant principles  Use of inhalation to \"set the instrument\" for the remainder of the statement was facilitating.  Phrase level exercises featuring nasal continuants in more complex phonemic contexts were employed  Good accuracy with moderate support  Tendency towards lower than optimal pitch  More subtle strategies for reinforcing and finding his target pitch throughout the day were provided, specifically having 1 ear bud in, and occasionally tapping his target pitch on the Pitch Perfect deanna. We discussed how he's very good at matching his pitch for a while in spontaneous conversation once he finds his pitch, and notably, he coughed significantly less during today's session once we had shifted him from the initial lower-than-optimal pitch.   I provided reinforcement that I do not expect him to be obviously humming or finding his pitch in front of others, but that there are more subtle ways to do so and it's important to use these strategies in order to reduce his cough and throat irritation.    Instruction of Home Practice:  A revised regimen for home practice was instructed.  I provided an AVS of important notes of today's therapeutic activities to facilitate practice.    ASSESSMENT/PLAN  Progress toward long-term goals:  Adequate but incomplete progress; please see above    Impressions:  IMPRESSIONS: chronic cough   Derrick had a " productive session of therapy today, working on reinforcement of target pitch with resonant voice tasks.  He will continue to work on his exercises on a daily basis, and work on incorporating the techniques into his daily activities. Speech therapy continues to be medically necessary for Derrick to participate fully and engage in activities of daily living.     Plan:   I will see Derrick when I return from family leave in May, per his preference, and will continue reinforcing chanted C#3 spontaneous speech to more natural speaking inflection. Continue monitoring for less drastic abdominal contraction.    For home practice goals, see AVS.     TOTAL SERVICE TIME: 60 minutes  TREATMENT ([id number])    Milagros Landa (voice), M.S., CCC-SLP  Speech-Language Pathologist  [facility]  [phone]  [email or web address]  Pronouns: she/her/hers      *this report was created in part through the use of computerized dictation software, and though reviewed following completion, some typographic errors may persist.  If there is confusion regarding any of this notes contents, please contact me for clarification    OBJECTIVE FINDINGS  PATIENT REPORTED MEASURES  Patient Supplied Answers To Last 2 VHI Questionnaires       No data to display                   Patient Supplied Answers To Last 2 CSI Questionnaires      12/18/2024     9:45 AM 1/8/2025    10:00 AM   Cough Severity Index (CSI)   My cough is worse when I lie down 2 2   My coughing problem causes me to restrict my personal and social life 4 3   I tend to avoid places because of my cough problem 4 4   I feel embarrassed because of my coughing problem 4 4   People ask, ''What's wrong?'' because I cough a lot 4 4   I run out of air when I cough 4 3   My coughing problem affects my voice 2 4   My coughing problem limits my physical activity 4 4   My coughing problem upsets me 4 4   People ask me if I am sick because I cough a lot 4 4   CSI Score 36  36        Patient-reported          Patient Supplied Answers To Last 2 EAT Questionnaires       No data to display                  Patient Supplied Answers to Dyspnea Index Questionnaire:       No data to display                   Again, thank you for allowing me to participate in the care of your patient.      Sincerely,    Ora Shepard, SLP

## 2025-02-11 NOTE — PROGRESS NOTES
Derrick Colon is a 54 year old male who is being evaluated via a billable video visit.      Derrick has been notified and verbally consented to the following:   This video visit will be conducted between you and your provider.  Patient has opted to conduct today's video visit vs an in-person appointment.   Video visits are billed at different rates depending on your insurance coverage. Please reach out to your insurance provider with any questions.   If during the course of the call the provider feels the appointment is not appropriate, you will not be charged for this service.  Provider has received verbal consent for billable virtual visit from the patient? Yes  Will anyone else be joining your video visit? No    Call initiated at: 1600   Type of Visit Platform Used: [employer] Video  Location of provider: Home  Location of patient: [facility]  PROGRESS REPORT (CPT 12560)    Patient: Aris Colon  Date of Service: 2/11/2025  Date of Last Service: 1/8/25  Number of Visits: 21/28 approved  Referring Physician: Dr. Kathy De Paz    Impressions from evaluation on 11/17/23 by Makr Dutta M.S., CCC-SLP:  Derrick Colon presents today with ongoing chronic cough which began in conjunction with workplace particle exposure, and with recent exacerbation including hemoptysis and emesis.  Laryngeal evaluation today was warranted given recent episodes of hemoptysis to ensure the patient was not recovering from hemorrhagic or ulcerative event.  This demonstrated no acute trauma which is fortunate, though diffuse change to the vocal folds and surrounding tissues from longstanding cough was seen consistent with previous evaluation if slightly worsened.  Cough response was aggressive to the application of oxymetazoline nasal spray, and guarding and light cough was noted to occur even prior to the administration, which underscores both the hypersensitivity as well as the stress base response to  "triggers that has developed over time.  Within the exam itself patient was able to attenuate cough through focused relaxation which bodes well for his ability to incorporate therapeutic strategies to reduce cough, though as we discussed full resolution of symptoms is often challenging due to their multifactorial nature especially in the case of irritant exposure.      SUBJECTIVE:  Since Derrick's last session, he reports the following:   He's been coughing a lot more than he has in a long time and is back to vomiting because of the coughing.   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 He has the option of standing if he wants to. He's working Wed through Friday from 10-5. He only talks if he needs to, if he has a question. He doesn't think there's anything bad in the air quality, though he's been mostly focused on learning the new processes.   He expressed significant frustration that his coughing has increased again and he states he's tired and done with coughing.     OBJECTIVE:  Patient Specific Goal Metrics:      9/10/2024    10:00 AM 11/25/2024    12:00 PM 1/8/2025    10:00 AM   Dysponia SLP Goals   How severe is your cough /throat clearing, if 0 is no cough at all and 10 is the worst cough? 7 7 8   How much does your cough/throat-clearing problem bother you?            Quite a bit Very Much Quite a bit       THERAPEUTIC ACTIVITIES  Today Derrick participated in the following therapeutic activities:  Counseling and Education:  Asked questions about the nature of his symptoms, and I answered all of these thoroughly.    I provided Derrick with explanation and skilled instruction of:  Resonant Voice Therapy (RVT) exercises to promote forward locus of resonance and optimized pattern of laryngeal adduction  Easy descending glide on /m/ utilized in conjunction with relaxed jaw, tongue, and lightly closed lips to facilitate forward resonant sound  Use of the carrier phrase \"mmhmm\" " "instructed to promote generalization to everyday speech  Word, phrase and conversational level exercises featuring nasal continuant loaded stimuli  Special care was taken to maintain sense of open pharyngeal space for broadened resonance in conjunction with forward resonant principles  Use of inhalation to \"set the instrument\" for the remainder of the statement was facilitating.  Phrase level exercises featuring nasal continuants in more complex phonemic contexts were employed  Good accuracy with moderate support  Tendency towards lower than optimal pitch  More subtle strategies for reinforcing and finding his target pitch throughout the day were provided, specifically having 1 ear bud in, and occasionally tapping his target pitch on the Pitch Perfect deanna. We discussed how he's very good at matching his pitch for a while in spontaneous conversation once he finds his pitch, and notably, he coughed significantly less during today's session once we had shifted him from the initial lower-than-optimal pitch.   I provided reinforcement that I do not expect him to be obviously humming or finding his pitch in front of others, but that there are more subtle ways to do so and it's important to use these strategies in order to reduce his cough and throat irritation.    Instruction of Home Practice:  A revised regimen for home practice was instructed.  I provided an AVS of important notes of today's therapeutic activities to facilitate practice.    ASSESSMENT/PLAN  Progress toward long-term goals:  Adequate but incomplete progress; please see above    Impressions:  IMPRESSIONS: chronic cough   Derrick had a productive session of therapy today, working on reinforcement of target pitch with resonant voice tasks.  He will continue to work on his exercises on a daily basis, and work on incorporating the techniques into his daily activities. Speech therapy continues to be medically necessary for Derrick to participate fully and engage " in activities of daily living.     Plan:   I will see Derrick when I return from family leave in May, per his preference, and will continue reinforcing chanted C#3 spontaneous speech to more natural speaking inflection. Continue monitoring for less drastic abdominal contraction.    For home practice goals, see AVS.     TOTAL SERVICE TIME: 60 minutes  TREATMENT ([id number])    Milagros Landa (voice), M.S., CCC-SLP  Speech-Language Pathologist  [facility]  [phone]  [email or web address]  Pronouns: she/her/hers      *this report was created in part through the use of computerized dictation software, and though reviewed following completion, some typographic errors may persist.  If there is confusion regarding any of this notes contents, please contact me for clarification    OBJECTIVE FINDINGS  PATIENT REPORTED MEASURES  Patient Supplied Answers To Last 2 VHI Questionnaires       No data to display                   Patient Supplied Answers To Last 2 CSI Questionnaires      12/18/2024     9:45 AM 1/8/2025    10:00 AM   Cough Severity Index (CSI)   My cough is worse when I lie down 2 2   My coughing problem causes me to restrict my personal and social life 4 3   I tend to avoid places because of my cough problem 4 4   I feel embarrassed because of my coughing problem 4 4   People ask, ''What's wrong?'' because I cough a lot 4 4   I run out of air when I cough 4 3   My coughing problem affects my voice 2 4   My coughing problem limits my physical activity 4 4   My coughing problem upsets me 4 4   People ask me if I am sick because I cough a lot 4 4   CSI Score 36  36       Patient-reported          Patient Supplied Answers To Last 2 EAT Questionnaires       No data to display                  Patient Supplied Answers to Dyspnea Index Questionnaire:       No data to display

## 2025-02-11 NOTE — PATIENT INSTRUCTIONS
"Hello!     It was a pleasure to see you today. Please complete the exercises below and remember, a few minutes of practice many times throughout the day is more important than one large practice session. If you have any questions about your strategies, feel free to contact me at [email or web address] or via [employer]. Please call [phone] for scheduling alterations or to be seen sooner in case of cancellations.      Home Exercise Program:  Try wearing 1 earbud occasionally, and tap your target pitch every so often to get that pitch into your ear. You do an excellent job of speaking at that pitch if you've heard it recently. You can tell people you're listening to music, checking a message, etc and no one is going to know. You could set an alarm for every hour to just hear your pitch.    SIP & BALLOON        \"Weird\" is not always bad. Sometimes it's just new or different than what you're used to. It might mean \"change in the right direction.\"      When you start to cough:   SNIFF BREATH IN & BALLOON CHEEKS OUT, SNIFF & BALLOON, SNIFF & BALLOON...  FEEL THE AIR FLOWING OUT AGAINST YOUR FINGERS/HAND  Chest up and shoulders back, belly crunch     ____________________________________________________        VOICE EXERCISES (once every other hour if you have time)  -- Nearly full glass of water, slightly tilted with ROUNDED lips.  -- \"Whoooooo\" (smooth, fast, even bubbles like a motor boat or a simmering pot of water)  -- ROUND \"OO\" LIPS, like whistling     SNIFF + 3-5x silently with just air for 5-7 seconds   You should feel a \"lighter\" rumble. These are still beneficial, even without your voice.   SNIFF + 3-5x short and easy sighs on \"Whooo\"   Short and lazy, not very impressive  SNIFF + 3-5x foghorn on single pitch for 5-7 seconds   C#3 or 139Hz  SNIFF + 3-5x Air > Voice > Air > Voice > Air > Voice   Very gently and quietly alternating without stopping the bubbles  C#3 or 139Hz  SNIFF + 3-5x sliding lower for " "5-7 seconds  \"ding dong\" C#3 to A#2, 139 to 117Hz  Imagine an airplane landing softly  You may need a little more air blowing as you go lower  SNIFF + 3-5x sliding up and down like sirens  These should feel easy and lazy- not super high or super low  Let your breath stay flowy and bubbles stay quick and even        -- Double-check that your LIPS are ROUNDED into the water and bubbles are quick and even. You can always check that you feel the air flowing against your finger tips, then do it with the bubble.      ____________________________________________________     THROAT STRETCH  - This may very likely trigger coughing.   - The point of this exercise is to slowly stretch and relax tissue that is currently really tight, like the spot along the left-side.   1. Lay on your couch, with your head over the armrest.   2. Slowly tilt your head back, lifting your chin.   3. Only go as far as feels comfortable or doesn't trigger coughing. Hold, slowly breathing in and humming, 5x.   4. Then release. Next time, you can tilt your head back a little further.      ____________________________________________________     Seasonal/Dryness Strategies:  Around the fall season, there can be more dryness in the air and people start being more prone to catching colds. Some strategies to help keep yourself feeling better are:  1. Gargling a little bit of warm water mixed with salt and baking soda can be tremendously helpful. It's about 2 oz warm water mixed with 1/4-1/2 tsp mixture (half salt/half baking soda). Plan to do this every morning and evening before brushing your teeth. If you find it helpful, you can gargle and spit in the sink multiple times a day.   2. If you feel mucusy in your throat (or if you can't spit in the sink) then gargle plain water and swallow it.   3. A humidifier can be really helpful in the fall/winter, but you can just use a kitchen bowl filled with hot, steamy water on the table next to you or your " "nightstand.   4. You can wring out a washcloth with hot or cold water, holding it over your nose and mouth for a few minutes while breathing through it.    ____________________________________________________        BUZZY VOICE- ALL .6Hz or C#3  Practice throughout the day, whenever you get a chance. Your hands can rest of your belly, but not pull.   1. 10x \"hmmm\" for 5-7 seconds   Focus on \"nasal\" or \"twangy\" vibrations in your lips/nose on \"mmm\"  Belly muscles crunches upward and inward  2. Hmm...moomoomoo, moemoemoe, mememe, mamama, mymymy, maymaymay, mehmehmeh  Focus on \"nasal\" or \"twangy\" vibrations in your lips/nose on \"mmm\"  Belly muscles crunches upward and inward  3. Hmm...moon, mom, mean, man, mine, moan, men, main, morning, meaning, manners, mammals  You can start doing 2-3 words together   Focus on \"nasal\" or \"twangy\" vibrations in your lips/nose on \"mmm\"  Belly muscles crunches upward and inward  Chest stays tall  4. Sentences: Still chanted on 138.59Hz   Hmm...Mother knits many mittens.   Nobody knows Alejandra s name.              Magaly s measles made Magaly miserable.   Milking machines make much noise.    Mild-mannered Kylie  sunday Rodrigues.   Kelvin marched many, many miles.       Millions made money in the market in March.   My mother munches melon on Mondays.  Maybe my memory needs mending.   Genesis made more money.   Magaly made many muffins.   Merlin motored many miles.   Kelvin makes major messes.   My momma made meatloaf.   Music makes mellow moods.  Hmmy favorite____ is ____; Nnnni like _____  HmmI'm going_____; HmmI need to_____. Starting with the buzzy sound each phrase.  Conversation, talking to yourself around the house, road signs/license plates, 20 questions, etc.       1st: Chanted on 1 pitch (138.59Hz or C#3) for now, like a monk or robot.   Focus on \"nasal\" or \"twangy\" vibrations in your lips/nose on \"mmm\"  Monitor your belly muscles with your hands, without pulling.  If you feel a " "tickle, SNIFF & Balloon until the tickle goes away.   ____________________________________________________     ANTI-TIGHT THROAT BREATHS   (4-5 breaths, several times a day)  These strategies are designed to help open and relax your throat, specifically whenever you're feeling tight or like it's hard to breathe.      SNIFF & BULLFROG: Imagine smelling something delicious, then blowing through ballooned cheeks to cool it down.   STRAW SIP & BULLFROG: Breathe in through rounded lips, like sipping air, then blow out with ballooned cheeks. You already use this to avoid or stop coughing, but it also helps loosen the throat.  TONGUE TUCK: Place the tip of your tongue against the roof of your mouth, creating a column in the center. Breathe in and feel cool & silent air sweep in along the sides of your tongue and down the back of your throat. Now keep your tongue tip in place and simply exhale.   This strategy will make your mouth dry, so stop to take a sip of water. Or, if you don't have any water nearby, you can pause to gently nibble the sides of your tongue with your molars. This tricks your brain into thinking you're eating and your mouth start to water, then move some saliva around with your tongue whenever needed, then return to your strategy.   The tongue tuck is the best strategy if you need to wear a face mask  MARK or \"Hand Breath\": Inhale with a BIG OPEN MOUTH through an ice-cream cone shaped hand. The inhale should be SILENT! Then blow out through a fist so your cheeks balloon.      Go very slowly so you don't get dizzy. Don't blow so hard that you see spots/diamonds/get dizzy. Take breaks as needed.      Thank you and have a great day!  Rosendo Marmolejo. (voice) M.S., CCC-SLP  Speech-Language Pathologist  [facility]  [phone]  [email or web address]  Pronouns: she/her/hers  "

## 2025-03-03 DIAGNOSIS — F41.1 GAD (GENERALIZED ANXIETY DISORDER): ICD-10-CM

## 2025-03-03 DIAGNOSIS — F32.A DEPRESSION: Primary | ICD-10-CM

## 2025-03-18 ENCOUNTER — TELEPHONE (OUTPATIENT)
Dept: OTOLARYNGOLOGY | Facility: CLINIC | Age: 55
End: 2025-03-18

## 2025-03-19 ENCOUNTER — TELEPHONE (OUTPATIENT)
Dept: OTOLARYNGOLOGY | Facility: CLINIC | Age: 55
End: 2025-03-19

## 2025-03-19 NOTE — TELEPHONE ENCOUNTER
Left Voicemail (1st Attempt) for the patient to call back and schedule the following:    Appointment type: Procedure  Provider: Dr. De Paz  Return date: Friday, 6/13  Specialty phone number: [phone]  Additional appointment(s) needed: No  Additonal Notes: 6/13 appointment, can be in any procedure spot that is open on that day

## 2025-03-19 NOTE — TELEPHONE ENCOUNTER
Called pt back and   LVM to schedule 6 week video visit with Dr De Paz        Gave direct number

## 2025-03-27 ENCOUNTER — TELEPHONE (OUTPATIENT)
Dept: OTOLARYNGOLOGY | Facility: CLINIC | Age: 55
End: 2025-03-27

## 2025-03-27 NOTE — TELEPHONE ENCOUNTER
Lvm to r/s upcoming virtual visits with Ora as provider's template has changed. Gave direct call back number to reschedule per his preferences.  Kinza Uriostegui on 3/27/2025 at 3:13 PM

## 2025-04-24 ENCOUNTER — OFFICE VISIT (OUTPATIENT)
Dept: BEHAVIORAL HEALTH | Facility: CLINIC | Age: 55
End: 2025-04-24
Payer: OTHER MISCELLANEOUS

## 2025-04-24 DIAGNOSIS — F33.2 SEVERE EPISODE OF RECURRENT MAJOR DEPRESSIVE DISORDER, WITHOUT PSYCHOTIC FEATURES (H): Primary | ICD-10-CM

## 2025-04-24 ASSESSMENT — ANXIETY QUESTIONNAIRES
GAD7 TOTAL SCORE: 8
4. TROUBLE RELAXING: MORE THAN HALF THE DAYS
GAD7 TOTAL SCORE: 8
8. IF YOU CHECKED OFF ANY PROBLEMS, HOW DIFFICULT HAVE THESE MADE IT FOR YOU TO DO YOUR WORK, TAKE CARE OF THINGS AT HOME, OR GET ALONG WITH OTHER PEOPLE?: VERY DIFFICULT
IF YOU CHECKED OFF ANY PROBLEMS ON THIS QUESTIONNAIRE, HOW DIFFICULT HAVE THESE PROBLEMS MADE IT FOR YOU TO DO YOUR WORK, TAKE CARE OF THINGS AT HOME, OR GET ALONG WITH OTHER PEOPLE: VERY DIFFICULT
1. FEELING NERVOUS, ANXIOUS, OR ON EDGE: SEVERAL DAYS
7. FEELING AFRAID AS IF SOMETHING AWFUL MIGHT HAPPEN: NOT AT ALL
5. BEING SO RESTLESS THAT IT IS HARD TO SIT STILL: SEVERAL DAYS
3. WORRYING TOO MUCH ABOUT DIFFERENT THINGS: SEVERAL DAYS
7. FEELING AFRAID AS IF SOMETHING AWFUL MIGHT HAPPEN: NOT AT ALL
GAD7 TOTAL SCORE: 8
6. BECOMING EASILY ANNOYED OR IRRITABLE: SEVERAL DAYS
2. NOT BEING ABLE TO STOP OR CONTROL WORRYING: MORE THAN HALF THE DAYS

## 2025-04-24 ASSESSMENT — PATIENT HEALTH QUESTIONNAIRE - PHQ9
SUM OF ALL RESPONSES TO PHQ QUESTIONS 1-9: 8
SUM OF ALL RESPONSES TO PHQ QUESTIONS 1-9: 8
10. IF YOU CHECKED OFF ANY PROBLEMS, HOW DIFFICULT HAVE THESE PROBLEMS MADE IT FOR YOU TO DO YOUR WORK, TAKE CARE OF THINGS AT HOME, OR GET ALONG WITH OTHER PEOPLE: SOMEWHAT DIFFICULT

## 2025-04-24 NOTE — PROGRESS NOTES
[facility] Primary Care: Integrated Behavioral Health    Integrated Behavioral Health   Mental Health & Addiction Services      Progress Note - Initial [facility] Visit     Patient Name: Aris Colon    Date: April 24, 2025  Service Type: Individual   Visit Start Time:  805AM  Visit End Time:  8:28 AM   Attendees: Patient   Service Modality: In-person     [facility] Visit Activities (Refresh list every visit): NEW         DATA:     Interactive Complexity: No   Crisis: No     Assessments completed:     The following assessments were completed by patient for this visit:  PHQ2: No questionnaires on file.  PHQ9:       9/12/2023     1:00 PM 4/24/2025     8:01 AM   PHQ-9 SCORE   PHQ-9 Total Score MyChart  8 (Mild depression)   PHQ-9 Total Score 10 8        Patient-reported     GAD2:        No data to display              GAD7:       9/12/2023     1:00 PM   SHERIDAN-7 SCORE   Total Score 5     CAGE-AID:       9/12/2023     1:00 PM   CAGE-AID Total Score   Total Score        Information is confidential and restricted. Go to Review Flowsheets to unlock data.     PROMIS 10-Global Health (all questions and answers displayed):       9/12/2023     1:00 PM   PROMIS 10   In general, would you say your health is:    In general, would you say your quality of life is:    In general, how would you rate your physical health?    In general, how would you rate your mental health, including your mood and your ability to think?    In general, how would you rate your satisfaction with your social activities and relationships?    In general, please rate how well you carry out your usual social activities and roles. (This includes activities at home, at work and in your community, and responsibilities as a parent, child, spouse, employee, friend, etc.)    To what extent are you able to carry out your everyday physical activities such as walking, climbing stairs, carrying groceries, or moving a chair?    In the past 7  "days, how often have you been bothered by emotional problems such as feeling anxious, depressed, or irritable?    In the past 7 days, how would you rate your fatigue on average?    In the past 7 days, how would you rate your pain on average, where 0 means no pain, and 10 means worst imaginable pain?    Global Mental Health Score    Global Physical Health Score    PROMIS TOTAL - SUBSCORES        Information is confidential and restricted. Go to Review Flowsheets to unlock data.     [place] Suicide Severity Rating Scale (Lifetime/Recent)      9/12/2023     1:00 PM 12/6/2024     9:06 AM   [place] Suicide Severity Rating (Lifetime/Recent)   Q1 Wish to be Dead (Lifetime) No    Q2 Non-Specific Active Suicidal Thoughts (Lifetime) No    Q1 Wished to be Dead (Past Month) no 0-->no   Q2 Suicidal Thoughts (Past Month) no 0-->no   Q3 Suicidal Thought Method no    Q4 Suicidal Intent without Specific Plan no    Q5 Suicide Intent with Specific Plan no    Q6 Suicide Behavior (Lifetime) no 0-->no   Level of Risk per Screen low risk no risks indicated        Referral:   Patient was referred to [facility] by  Kathy De Paz MD .    Reason for referral: clarify behavioral health diagnosis and determine behavioral health treatment options.      [facility] introduced self and role. Discussed informed consent and limits to confidentiality.     Presenting Concerns/ Current Stressors:   Patient is presenting with an issue of coughing - which has impacted Pt's whole life. Coughing is triggered by many types of factors, been \"fighting it for 3 years\", Pt has been stocked by private investigators, difficulty sleeping, \"drained, tired exhausted.\" \"Tried every medicine in the book.\" Coughing leads to vomit and vomit leads to blood.     The amount of coughing has led to depression and difficulty with functioning. Pt reported depressive symptoms because of decreased activity and inability to perform daily functioning.     Therapeutic " Interventions:  Motivational Interviewing (MI): Validated patient's thoughts, feelings and experience. Expressed respect for patient's autonomy in decision making.  Asked open-ended questions to invite patient's self-reflection and self-direction around change and what is important for them in working towards their goals.  Expressed and demonstrated empathy through reflective listening.     Response to treatment interventions:   Patient was receptive to interventions utilized.  Patient was engaged in the therapy process.      Safety Issues and Plan for Safety and Risk Management:     Patient denies a history of suicidal ideation, suicide attempts, self-injurious behavior, homicidal ideation, homicidal behavior, and and other safety concerns   Patient denies current fears or concerns for personal safety.   Patient denies current or recent suicidal ideation or behaviors.   Patient denies current or recent homicidal ideation or behaviors.   Patient denies current or recent self injurious behavior or ideation.   Patient denies other safety concerns.   Recommended that patient call 911 or go to the local ED should there be a change in any of these risk factors   Patient reports there are no firearms in the house.       ASSESSMENT:   Mental Status:     Appearance:   Appropriate    Eye Contact:   Good    Psychomotor Behavior: Normal    Attitude:   Cooperative  Interested   Orientation:   All   Speech Rate / Production: Normal/ Responsive   Volume:   Normal    Mood:    Normal   Affect:    Appropriate    Thought Content:  Clear    Thought Form:  Logical    Insight:    Good         Diagnostic Criteria:   Major Depressive Disorder  CRITERIA (A-C) REPRESENT A MAJOR DEPRESSIVE EPISODE - SELECT THESE CRITERIA  A) Recurrent episode(s) - symptoms have been present during the same 2-week period and represent a change from previous functioning 5 or more symptoms (required for diagnosis)   - Depressed mood. Note: In children and  adolescents, can be irritable mood.     - Diminished interest or pleasure in all, or almost all, activities.    - Increased sleep.    - Psychomotor activity agitation.    - Fatigue or loss of energy.    - Feelings of worthlessness or inappropriate and excessive guilt.    - Diminished ability to think or concentrate, or indecisiveness.   B) The symptoms cause clinically significant distress or impairment in social, occupational, or other important areas of functioning  C) The episode is not attributable to the physiological effects of a substance or to another medical condition  D) The occurence of major depressive episode is not better explained by other thought / psychotic disorders  E) There has never been a manic episode or hypomanic episode        DSM5 Diagnoses: (Sustained by DSM5 Criteria Listed Above)     Diagnoses: 296.32 (F33.1) Major Depressive Disorder, Recurrent Episode, Moderate _     Psychosocial / Contextual Factors:  work comp leading to impaired functioning        Collateral Reports Completed:   Routed note to Care Team Member(s)        PLAN: (Homework, other):     1. Patient was provided:  recommendation to schedule follow-up with [facility]     2. Provider recommended the following referrals: Referring to Health Psychology.        3. Suicide Risk and Safety Concerns were assessed for Aris Colon    Safety Plan:   Patient denied any current/recent/lifetime history of suicidal ideation and/or behaviors. Recommended that patient call 911 or go to the local ED should there be a change in any of these risk factors       Mike Diallo LM [facility]   April 24, 2025    Answers submitted by the patient for this visit:  Patient Health Questionnaire (Submitted on 4/24/2025)  If you checked off any problems, how difficult have these problems made it for you to do your work, take care of things at home, or get along with other people?: Somewhat difficult  PHQ9 TOTAL SCORE: 8  Patient Health  Questionnaire (G7) (Submitted on 4/24/2025)  SHERIDAN 7 TOTAL SCORE: 8

## 2025-04-29 ENCOUNTER — DOCUMENTATION ONLY (OUTPATIENT)
Dept: SPEECH THERAPY | Facility: CLINIC | Age: 55
End: 2025-04-29

## 2025-04-29 NOTE — PROGRESS NOTES
Video Esophagram Review Rounds  Imaging Review of MBSS conducted with attending physician Kathy De Paz and reviewed/discussed with SLP Jerrica Brown, Rosanna Davies, and/or Cris Leslie and with GI representative, TOM Preciado    Relevant Background:Presented to Dr. De Paz with severe episodes of coughing. He has had multiple treatments however hadn't been able to resolve the coughing. He has participated in voice therapy and other treatments.     Esophagram: 9/13/23: Essentially normal esophagram    MBSS Date: 9/13/23    Findings:  Pharyngeal Weakness:No  Epiglottic dysfunction: No  Penetration: No  Aspiration: No  UES dysfunction: No  Details: Safe functional oropharyngeal swallow      Recommendations:  GI referral for formal workup to help optimize cough.

## 2025-04-30 DIAGNOSIS — R05.3 CHRONIC COUGH: Primary | ICD-10-CM

## 2025-05-01 ENCOUNTER — DOCUMENTATION ONLY (OUTPATIENT)
Dept: OTOLARYNGOLOGY | Facility: CLINIC | Age: 55
End: 2025-05-01

## 2025-05-01 NOTE — PROGRESS NOTES
Copy of orders for EKG and PFT labs and GI referral faxed to patients [facility]. Fax number [phone] as provided by patient. 8 pgs faxed.

## 2025-05-07 ENCOUNTER — TELEPHONE (OUTPATIENT)
Dept: BEHAVIORAL HEALTH | Facility: CLINIC | Age: 55
End: 2025-05-07

## 2025-05-14 DIAGNOSIS — R05.3 CHRONIC COUGH: ICD-10-CM

## 2025-05-14 LAB
DLCOCOR-%PRED-PRE: 96 %
DLCOCOR-PRE: 27.83 ML/MIN/MMHG
DLCOUNC-%PRED-PRE: 93 %
DLCOUNC-PRE: 27.01 ML/MIN/MMHG
DLCOUNC-PRED: 28.88 ML/MIN/MMHG
ERV-%PRED-PRE: 44 %
ERV-PRE: 0.73 L
ERV-PRED: 1.65 L
EXPTIME-PRE: 2.86 SEC
FEF2575-%PRED-PRE: 168 %
FEF2575-PRE: 5.3 L/SEC
FEF2575-PRED: 3.15 L/SEC
FEFMAX-%PRED-PRE: 98 %
FEFMAX-PRE: 9.48 L/SEC
FEFMAX-PRED: 9.67 L/SEC
FEV1-%PRED-PRE: 108 %
FEV1-PRE: 3.86 L
FEV1FEV6-PRE: 91 %
FEV1FEV6-PRED: 80 %
FEV1FVC-PRE: 89 %
FEV1FVC-PRED: 79 %
FEV1SVC-PRE: 79 %
FEV1SVC-PRED: 76 %
FIFMAX-PRE: 6.98 L/SEC
FRCPLETH-%PRED-PRE: 86 %
FRCPLETH-PRE: 3.15 L
FRCPLETH-PRED: 3.66 L
FVC-%PRED-PRE: 95 %
FVC-PRE: 4.33 L
FVC-PRED: 4.52 L
HGB BLD-MCNC: 13.6 G/DL
IC-%PRED-PRE: 125 %
IC-PRE: 4.15 L
IC-PRED: 3.3 L
RVPLETH-%PRED-PRE: 103 %
RVPLETH-PRE: 2.42 L
RVPLETH-PRED: 2.33 L
TLCPLETH-%PRED-PRE: 100 %
TLCPLETH-PRE: 7.3 L
TLCPLETH-PRED: 7.23 L
VA-%PRED-PRE: 98 %
VA-PRE: 6.6 L
VC-%PRED-PRE: 104 %
VC-PRE: 4.88 L
VC-PRED: 4.67 L

## 2025-05-14 PROCEDURE — 94729 DIFFUSING CAPACITY: CPT | Performed by: INTERNAL MEDICINE

## 2025-05-14 PROCEDURE — 85018 HEMOGLOBIN: CPT | Mod: QW

## 2025-05-14 PROCEDURE — 94726 PLETHYSMOGRAPHY LUNG VOLUMES: CPT | Performed by: INTERNAL MEDICINE

## 2025-05-14 PROCEDURE — 94375 RESPIRATORY FLOW VOLUME LOOP: CPT | Performed by: INTERNAL MEDICINE

## 2025-05-20 ENCOUNTER — VIRTUAL VISIT (OUTPATIENT)
Dept: BEHAVIORAL HEALTH | Facility: CLINIC | Age: 55
End: 2025-05-20
Payer: OTHER MISCELLANEOUS

## 2025-05-20 ENCOUNTER — TRANSCRIBE ORDERS (OUTPATIENT)
Dept: OTOLARYNGOLOGY | Facility: CLINIC | Age: 55
End: 2025-05-20

## 2025-05-20 ENCOUNTER — HOSPITAL ENCOUNTER (OUTPATIENT)
Dept: CARDIOLOGY | Facility: HOSPITAL | Age: 55
Discharge: HOME | End: 2025-05-20
Attending: OTOLARYNGOLOGY
Payer: OTHER MISCELLANEOUS

## 2025-05-20 DIAGNOSIS — F33.2 SEVERE EPISODE OF RECURRENT MAJOR DEPRESSIVE DISORDER, WITHOUT PSYCHOTIC FEATURES (H): Primary | ICD-10-CM

## 2025-05-20 DIAGNOSIS — R05.3 CHRONIC COUGH: ICD-10-CM

## 2025-05-20 DIAGNOSIS — R05.3 CHRONIC COUGH: Primary | ICD-10-CM

## 2025-05-20 LAB
ATRIAL RATE - MUSE: 87 BPM
DIASTOLIC BLOOD PRESSURE - MUSE: NORMAL MMHG
INTERPRETATION ECG - MUSE: NORMAL
P AXIS - MUSE: 63 DEGREES
PR INTERVAL - MUSE: 164 MS
QRS DURATION - MUSE: 90 MS
QT - MUSE: 378 MS
QTC - MUSE: 454 MS
R AXIS - MUSE: -63 DEGREES
SYSTOLIC BLOOD PRESSURE - MUSE: NORMAL MMHG
T AXIS - MUSE: 33 DEGREES
VENTRICULAR RATE- MUSE: 87 BPM

## 2025-05-20 PROCEDURE — 93005 ELECTROCARDIOGRAM TRACING: CPT

## 2025-05-20 PROCEDURE — 90832 PSYTX W PT 30 MINUTES: CPT | Mod: 95 | Performed by: MARRIAGE & FAMILY THERAPIST

## 2025-05-20 NOTE — PROGRESS NOTES
[facility] Primary Care: Integrated Behavioral Health    Behavioral Health Clinician Progress Note   Mental Health & Addiction Services      Tuesday May 20, 2025    Patient Name: Aris Colon       Service Type:  Individual   Service Location:  [facility] / Email (patient reached)   Visit Start Time: 1:06 PM  Visit End Time:  1:27 PM   Session Length: 16 - 37    Attendees: Patient   Service Modality: Video Visit:      Provider verified identity through the following two step process.  Patient provided:  Patient photo    Telemedicine Visit: The patient's condition can be safely assessed and treated via synchronous audio and visual telemedicine encounter.      Reason for Telemedicine Visit: Patient has requested telehealth visit    Originating Site (Patient Location): Patient's home    Distant Site (Provider Location): [facility]    Consent:  The patient/guardian has verbally consented to: the potential risks and benefits of telemedicine (video visit) versus in person care; bill my insurance or make self-payment for services provided; and responsibility for payment of non-covered services.     Patient would like the video invitation sent by:  My Chart    Mode of Communication:  Video Conference via [facility]    Distant Location (Provider):  Off-site    As the provider I attest to compliance with applicable laws and regulations related to telemedicine.   Visit number: 2    [facility] Visit Activities (Refresh list every visit): [facility] Only     Date of Brief Diagnostic Assessment : NA  Treatment Plan Review Date: 8/20/25      DATA:    Extended Session (60+ minutes): No   Interactive Complexity: No   Crisis: No   [facility] Patient: No     Assessments completed:  The following assessments were completed by patient for this visit:  PHQ2: No questionnaires on file.  PHQ9:       9/12/2023     1:00 PM 4/24/2025     8:01 AM   PHQ-9 SCORE   PHQ-9 Total Score Amy   8 (Mild depression)   PHQ-9 Total Score 10 8        Patient-reported     GAD2:       4/24/2025     8:06 AM   SHERIDAN-2   Feeling nervous, anxious, or on edge 1   Not being able to stop or control worrying 2   SHERIDAN-2 Total Score 3        Patient-reported     GAD7:       9/12/2023     1:00 PM 4/24/2025     8:06 AM   SHERIDAN-7 SCORE   Total Score  8 (mild anxiety)   Total Score 5 8        Patient-reported     CAGE-AID:       9/12/2023     1:00 PM   CAGE-AID Total Score   Total Score        Information is confidential and restricted. Go to Review Flowsheets to unlock data.     PROMIS 10-Global Health (all questions and answers displayed):       9/12/2023     1:00 PM 5/20/2025     1:04 PM   PROMIS 10   In general, would you say your health is:  Fair   In general, would you say your quality of life is:  Fair   In general, how would you rate your physical health?  Fair   In general, how would you rate your mental health, including your mood and your ability to think?  Fair   In general, how would you rate your satisfaction with your social activities and relationships?  Fair   In general, please rate how well you carry out your usual social activities and roles  Fair   To what extent are you able to carry out your everyday physical activities such as walking, climbing stairs, carrying groceries, or moving a chair?  Not at all   In the past 7 days, how often have you been bothered by emotional problems such as feeling anxious, depressed, or irritable?  Often   In the past 7 days, how would you rate your fatigue on average?  Severe   In the past 7 days, how would you rate your pain on average, where 0 means no pain, and 10 means worst imaginable pain?  7   In general, would you say your health is:  2   In general, would you say your quality of life is:  2   In general, how would you rate your physical health?  2   In general, how would you rate your mental health, including your mood and your ability to think?  2   In general, how  would you rate your satisfaction with your social activities and relationships?  2   In general, please rate how well you carry out your usual social activities and roles. (This includes activities at home, at work and in your community, and responsibilities as a parent, child, spouse, employee, friend, etc.)  2   To what extent are you able to carry out your everyday physical activities such as walking, climbing stairs, carrying groceries, or moving a chair?  1   In the past 7 days, how often have you been bothered by emotional problems such as feeling anxious, depressed, or irritable?  4   In the past 7 days, how would you rate your fatigue on average?  4   In the past 7 days, how would you rate your pain on average, where 0 means no pain, and 10 means worst imaginable pain?  7   Global Mental Health Score  8    Global Physical Health Score  7    PROMIS TOTAL - SUBSCORES  15        Information is confidential and restricted. Go to Review Flowsheets to unlock data.    Patient-reported     [place] Suicide Severity Rating Scale (Lifetime/Recent)      9/12/2023     1:00 PM 12/6/2024     9:06 AM 4/24/2025     8:28 AM   [place] Suicide Severity Rating (Lifetime/Recent)   Q1 Wish to be Dead (Lifetime) No     Q2 Non-Specific Active Suicidal Thoughts (Lifetime) No     Q1 Wished to be Dead (Past Month) no  0-->no    Q2 Suicidal Thoughts (Past Month) no  0-->no    Q3 Suicidal Thought Method no      Q4 Suicidal Intent without Specific Plan no      Q5 Suicide Intent with Specific Plan no      Q6 Suicide Behavior (Lifetime) no  0-->no    Level of Risk per Screen low risk  no risks indicated    1. Wish to be Dead (Lifetime)   N   1. Wish to be Dead (Past 1 Month)   N   2. Non-Specific Active Suicidal Thoughts (Lifetime)   N   2. Non-Specific Active Suicidal Thoughts (Past 1 Month)   N   Calculated C-SSRS Risk Score (Lifetime/Recent)   No Risk Indicated       Data saved with a previous flowsheet row definition        Reason for  "Visit/Presenting Concern:  Chronic cough impairing     Current Stressors / Issues:   Patient was prescribed pulmonary fitness test and EKG and then will be \"okayed to take new medicine.\" Hope of new medicate is to reduce inflammation and reduce irritation in throat. Patient is hopeful    [facility] through work comp wanted to see referral in [facility].     Pt reported disruptions to life due to coughing spells. Continues to have difficult sleep patterns. Pt reporting \"shooting pool on Tuesdays and Thursdays\"     Patient getting out for activities is dependent on the weather. Pt has restrictions on physical activities.               Therapeutic Interventions:  Motivational Interviewing (MI): Validated patient's thoughts, feelings and experience. Expressed respect for patient's autonomy in decision making.     Response to treatment interventions:   Patient was receptive to interventions utilized.  Patient was engaged in the therapy process.       Progress on Treatment Objective(s) / Homework:   New Objective established this session - PREPARATION (Decided to change - considering how); Intervened by negotiating a change plan and determining options / strategies for behavior change, identifying triggers, exploring social supports, and working towards setting a date to begin behavior change     Medication Review:   No changes to current psychiatric medication(s)     Medication Compliance:   No     Chemical Use Review:  Substance Use: Chemical use reviewed, no active concerns identified      Tobacco Use: No current tobacco use.       Assessment: Current Emotional / Mental Status (status of significant symptoms):    Risk status (Self / Other harm or suicidal ideation)   Patient denies a history of suicidal ideation, suicide attempts, self-injurious behavior, homicidal ideation, homicidal behavior, and and other safety concerns   Patient denies current fears or concerns for personal safety.   Patient denies current or recent " suicidal ideation or behaviors.   Patient denies current or recent homicidal ideation or behaviors.   Patient denies current or recent self injurious behavior or ideation.   Patient denies other safety concerns.   Recommended that patient call 911 or go to the local ED should there be a change in any of these risk factors      ASSESSMENT:   Mental Status:     Appearance:   Appropriate    Eye Contact:   Good    Psychomotor Behavior: Normal    Attitude:   Cooperative    Orientation:   All   Speech Rate / Production: Normal    Volume:   Normal    Mood:    Normal   Affect:    Appropriate    Thought Content:  Clear    Thought Form:  Coherent  Logical    Insight:    Good          Diagnoses:     Depressive Disorder      Collateral Reports Completed:   Routed note to PCP       Plan: (Homework, other):   Patient was provided No indications of CD issues  Patient was given information about behavioral services and encouraged to schedule a follow up appointment with the clinic [facility] as needed.         Mike PEARSON, [facility]     _____________________________________________________________________________________________________________________________________                                              Individual Treatment Plan    Patient's Name: Aris Colon   YOB: 1970  Date of Creation: 5/20/25  Date Treatment Plan Last Reviewed/Revised: 5/20/25    DSM5 Diagnoses: 296.33 (F33.2) Major Depressive Disorder, Recurrent Episode, Severe _  Psychosocial / Contextual Factors: Medical Complexities, Occupational Issues, Interpersonal Concerns, and Legal Involvement  PROMIS (reviewed every 90 days):   The following assessments were completed by patient for this visit:  PHQ2: No questionnaires on file.  PHQ9:       9/12/2023     1:00 PM 4/24/2025     8:01 AM   PHQ-9 SCORE   PHQ-9 Total Score MyChart  8 (Mild depression)   PHQ-9 Total Score 10 8        Patient-reported     GAD2:       4/24/2025     8:06 AM    SHERIDAN-2   Feeling nervous, anxious, or on edge 1   Not being able to stop or control worrying 2   SHERIDAN-2 Total Score 3        Patient-reported     GAD7:       9/12/2023     1:00 PM 4/24/2025     8:06 AM   SHERIDAN-7 SCORE   Total Score  8 (mild anxiety)   Total Score 5 8        Patient-reported     CAGE-AID:       9/12/2023     1:00 PM   CAGE-AID Total Score   Total Score        Information is confidential and restricted. Go to Review Flowsheets to unlock data.     PROMIS 10-Global Health (all questions and answers displayed):       9/12/2023     1:00 PM 5/20/2025     1:04 PM   PROMIS 10   In general, would you say your health is:  Fair   In general, would you say your quality of life is:  Fair   In general, how would you rate your physical health?  Fair   In general, how would you rate your mental health, including your mood and your ability to think?  Fair   In general, how would you rate your satisfaction with your social activities and relationships?  Fair   In general, please rate how well you carry out your usual social activities and roles  Fair   To what extent are you able to carry out your everyday physical activities such as walking, climbing stairs, carrying groceries, or moving a chair?  Not at all   In the past 7 days, how often have you been bothered by emotional problems such as feeling anxious, depressed, or irritable?  Often   In the past 7 days, how would you rate your fatigue on average?  Severe   In the past 7 days, how would you rate your pain on average, where 0 means no pain, and 10 means worst imaginable pain?  7   In general, would you say your health is:  2   In general, would you say your quality of life is:  2   In general, how would you rate your physical health?  2   In general, how would you rate your mental health, including your mood and your ability to think?  2   In general, how would you rate your satisfaction with your social activities and relationships?  2   In general, please rate  how well you carry out your usual social activities and roles. (This includes activities at home, at work and in your community, and responsibilities as a parent, child, spouse, employee, friend, etc.)  2   To what extent are you able to carry out your everyday physical activities such as walking, climbing stairs, carrying groceries, or moving a chair?  1   In the past 7 days, how often have you been bothered by emotional problems such as feeling anxious, depressed, or irritable?  4   In the past 7 days, how would you rate your fatigue on average?  4   In the past 7 days, how would you rate your pain on average, where 0 means no pain, and 10 means worst imaginable pain?  7   Global Mental Health Score  8    Global Physical Health Score  7    PROMIS TOTAL - SUBSCORES  15        Information is confidential and restricted. Go to Review Flowsheets to unlock data.    Patient-reported     [place] Suicide Severity Rating Scale (Lifetime/Recent)      9/12/2023     1:00 PM 12/6/2024     9:06 AM 4/24/2025     8:28 AM   [place] Suicide Severity Rating (Lifetime/Recent)   Q1 Wish to be Dead (Lifetime) No     Q2 Non-Specific Active Suicidal Thoughts (Lifetime) No     Q1 Wished to be Dead (Past Month) no  0-->no    Q2 Suicidal Thoughts (Past Month) no  0-->no    Q3 Suicidal Thought Method no      Q4 Suicidal Intent without Specific Plan no      Q5 Suicide Intent with Specific Plan no      Q6 Suicide Behavior (Lifetime) no  0-->no    Level of Risk per Screen low risk  no risks indicated    1. Wish to be Dead (Lifetime)   N   1. Wish to be Dead (Past 1 Month)   N   2. Non-Specific Active Suicidal Thoughts (Lifetime)   N   2. Non-Specific Active Suicidal Thoughts (Past 1 Month)   N   Calculated C-SSRS Risk Score (Lifetime/Recent)   No Risk Indicated       Data saved with a previous flowsheet row definition        Referral / Collaboration:  Was/were discussed and patient will pursue.    Anticipated number of session for this  episode of care:  less then 3 sessions  Anticipation frequency of session: Every other week  Anticipated Duration of each session: 16-37 minutes  Treatment plan will be reviewed in 90 days or when goals have been changed.       MeasurableTreatment Goal(s) related to diagnosis / functional impairment(s)  Goal 1: Patient will reduce frequency and intensity of depressive episode(s). establish a consistent sleep routine and practicing use of good sleep hygiene skills. increase engagement in enjoyable activities. increase social interactions and activities. acquire relapse prevention skills.    I will know I've met my goal when I am able to schedule a long term therapist.      Status: New - Date: 5/20/25     Intervention(s)  [facility] will Cognitive Behavioral Therapy (CBT): Provide psycho-education on cognitive distortions. and Identify and challenge maladaptive patterns of behavior and thinking that interfere with patient's life   Written by  Mike PEARSON, [facility]

## 2025-05-20 NOTE — PATIENT INSTRUCTIONS
Patient was referred to [facility] for ongoing mental health therapy, intake at [phone]

## 2025-05-21 ENCOUNTER — PATIENT OUTREACH (OUTPATIENT)
Dept: CARE COORDINATION | Facility: CLINIC | Age: 55
End: 2025-05-21

## 2025-05-29 DIAGNOSIS — Z01.89 ENCOUNTER FOR LABORATORY TEST: Primary | ICD-10-CM

## 2025-06-03 ENCOUNTER — MYC MEDICAL ADVICE (OUTPATIENT)
Dept: OTOLARYNGOLOGY | Facility: CLINIC | Age: 55
End: 2025-06-03

## 2025-06-03 ENCOUNTER — LAB (OUTPATIENT)
Dept: LAB | Facility: CLINIC | Age: 55
End: 2025-06-03
Payer: OTHER MISCELLANEOUS

## 2025-06-03 DIAGNOSIS — Z01.89 ENCOUNTER FOR LABORATORY TEST: ICD-10-CM

## 2025-06-03 PROCEDURE — 84460 ALANINE AMINO (ALT) (SGPT): CPT

## 2025-06-03 PROCEDURE — 82040 ASSAY OF SERUM ALBUMIN: CPT

## 2025-06-03 PROCEDURE — 82977 ASSAY OF GGT: CPT

## 2025-06-03 PROCEDURE — 36415 COLL VENOUS BLD VENIPUNCTURE: CPT

## 2025-06-03 PROCEDURE — 84075 ASSAY ALKALINE PHOSPHATASE: CPT

## 2025-06-03 PROCEDURE — 83615 LACTATE (LD) (LDH) ENZYME: CPT

## 2025-06-03 PROCEDURE — 84450 TRANSFERASE (AST) (SGOT): CPT

## 2025-06-03 NOTE — TELEPHONE ENCOUNTER
Left Voicemail (1st Attempt) for the patient to call back and schedule the following:    Appointment type: Return SLP Voice (virtual)  Provider: Ora Shepard  Return date: 6/16 @ 8:00am or 6/18 @ 12:00 (Okd to use MARY ELLEN slots per provider)  Specialty phone number: [phone]  Additional appointment(s) needed: No  Additonal Notes: No

## 2025-06-04 LAB
ALBUMIN SERPL BCG-MCNC: 3.5 G/DL (ref 3.5–5.2)
ALP SERPL-CCNC: 67 U/L (ref 40–150)
ALT SERPL W P-5'-P-CCNC: 46 U/L (ref 0–70)
AST SERPL W P-5'-P-CCNC: 25 U/L (ref 0–45)
GGT SERPL-CCNC: 26 U/L (ref 8–61)
LDH SERPL L TO P-CCNC: 178 U/L (ref 0–250)

## 2025-06-06 ENCOUNTER — RESULTS FOLLOW-UP (OUTPATIENT)
Dept: SURGERY | Facility: CLINIC | Age: 55
End: 2025-06-06

## 2025-06-23 ENCOUNTER — TELEPHONE (OUTPATIENT)
Dept: OTOLARYNGOLOGY | Facility: CLINIC | Age: 55
End: 2025-06-23

## 2025-07-07 ENCOUNTER — OFFICE VISIT (OUTPATIENT)
Dept: GASTROENTEROLOGY | Facility: CLINIC | Age: 55
End: 2025-07-07
Attending: OTOLARYNGOLOGY

## 2025-07-07 VITALS
WEIGHT: 213 LBS | HEART RATE: 72 BPM | DIASTOLIC BLOOD PRESSURE: 86 MMHG | SYSTOLIC BLOOD PRESSURE: 122 MMHG | RESPIRATION RATE: 14 BRPM | HEIGHT: 70 IN | OXYGEN SATURATION: 98 % | BODY MASS INDEX: 30.49 KG/M2

## 2025-07-07 DIAGNOSIS — R05.3 CHRONIC COUGH: ICD-10-CM

## 2025-07-07 PROCEDURE — 99204 OFFICE O/P NEW MOD 45 MIN: CPT | Performed by: PHYSICIAN ASSISTANT

## 2025-07-07 ASSESSMENT — PAIN SCALES - GENERAL: PAINLEVEL_OUTOF10: NO PAIN (0)

## 2025-07-07 NOTE — LETTER
"7/7/2025      Aris Colon  [address] [place] [address]      Dear Colleague,    Thank you for referring your patient, Aris Colon, to the [facility] [place]. Please see a copy of my visit note below.    Gastroenterology Visit for: Aris Colon 1970   MRN: [id number]     Reason for Visit:  chief complaint    Referred by: Baldev  / [address] [place] [address] / [facility] [address]  Patient Care Team:  Clinic, Mena Núñez as PCP - General  Kathy De Paz MD as MD (Otolaryngology)  Bob Ruvalcaba PsyD as Assigned Sleep Provider  Kathy eD Paz MD as Assigned Surgical Provider  Ora Shepard SLP as Speech Language Pathologist (Speech Language/Path)  Jacoby Diallo LMFT as Assigned Behavioral Health Provider    History of Present Illness:   Aris Colon is a 55 year old male with significant past medical history pertinent for depression and chronic cough who is presenting as a new patient in consultation at the request of Dr. De Paz with a chief complaint of chronic cough.    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      Amitriptyline - No symptomatic improvement. Noting \"quick anger and quick to temper.\" Currently taking 15 mg. Will be escalate to 20 mg for weeks 4/5.     Not taking PPI.     Reports significant impacts to " quality of life.     Consumes 1 Pepsi per day 12 oz. No increase in symptoms with consumption.     ETOH - over the past year has consumed 2-3 beers    Denies use of consumption of coffee, ETOH, no history of tobacco use and cannabinoid products. No recreational drug use.     No family history or GI related malignancy (esophageal, gastric, pancreatic, liver or colon).     No allergy to Nickel.       Esophageal Questionnaire(s)    BEDQ Questionnaire       No data to display                   No data to display                Eckardt Questionnaire       No data to display                Promis 10 Questionnaire      9/12/2023     1:00 PM 5/20/2025     1:04 PM   PROMIS 10 FLOWSHEET DATA   In general, would you say your health is: 2 2   In general, would you say your quality of life is: 2 2   In general, how would you rate your physical health? 2 2   In general, how would you rate your mental health, including your mood and your ability to think? 2 2   In general, how would you rate your satisfaction with your social activities and relationships? 1 2   In general, please rate how well you carry out your usual social activities and roles. (This includes activities at home, at work and in your community, and responsibilities as a parent, child, spouse, employee, friend, etc.) 1 2   To what extent are you able to carry out your everyday physical activities such as walking, climbing stairs, carrying groceries, or moving a chair? 2 1   In the past 7 days, how often have you been bothered by emotional problems such as feeling anxious, depressed, or irritable? 5 4   In the past 7 days, how would you rate your fatigue on average? 3 4   In the past 7 days, how would you rate your pain on average, where 0 means no pain, and 10 means worst imaginable pain? 0 7   Mental health question re-calculation - no clinical value 1 2    Physical health question re-calculation - no clinical value 3 2    Pain question re-calculation - no  clinical value 5 2    Global Mental Health Score 6 8    Global Physical Health Score 12 7    PROMIS TOTAL - SUBSCORES 18 15        Patient-reported           STUDIES & PROCEDURES:    EGD:       Colonoscopy:     EndoFLIP directed at the UES or LES (8cm (EF-325) balloon length or 16cm (EF-322) balloon length):   Date:  8cm balloon  Balloon inflation Balloon pressure CSA (mm^2) DI (mm^2/mmHg) Dmin (mm) Compliance   20 (ladmark ID)        30        40        50           16cm balloon  Balloon inflation Balloon pressure CSA (mm^2) DI (mm^2/mmHg) Dmin (mm) Compliance   30 (ladmark ID)        40        50        60        70           High Resolution Manometry:    PH/Impedance:     Bravo:    CT:    Esophagram:    9/13/2023  ESOPHAGUS: Normal. Normal peristalsis. No strictures, masses, or inflammatory changes. No gastroesophageal reflux in the recumbent position.                                                                   IMPRESSION:      Normal esophagram.    FL VSS:     9/13/2025  FINDINGS:   FLUOROSCOPIC TIME: 0.4 minutes  NUMBER OF IMAGES: 4 videofluoroscopic imaging series.     Swallow study with Speech Pathology using multiple barium thicknesses.      Developmental fusion of C5 and C6 at the disc space. Anterior osteophytes C4-C5 and C6-C7.     No penetration or aspiration with thin consistency or puree consistency. Normal epiglottic movement.                                                                      IMPRESSION:     Essentially normal swallow study.     Please see separately dictated report from speech pathology for additional description, analysis, and management recommendations.    GES:    U/S:     XRAY:    Other:       Prior medical records were reviewed including, but not limited to, notes from referring providers, lab work, radiographic tests, and other diagnostic tests. Pertinent results were summarized above.     History   No past medical history on file.    Past Surgical History:   Procedure  Laterality Date     LARYNGOSCOPY, FLEXIBLE WITH INJECTION N/A 12/6/2024    Procedure: LARYNGOSCOPY, FLEXIBLE WITH INJECTION with botox;  Surgeon: Kathy De Paz MD;  Location: [facility] OR       Social History     Socioeconomic History     Marital status:      Spouse name: Not on file     Number of children: Not on file     Years of education: Not on file     Highest education level: Not on file   Occupational History     Not on file   Tobacco Use     Smoking status: Never     Passive exposure: Never     Smokeless tobacco: Never   Vaping Use     Vaping status: Never Used   Substance and Sexual Activity     Alcohol use: Not on file     Drug use: Not on file     Sexual activity: Not on file   Other Topics Concern     Not on file   Social History Narrative     Not on file     Social Drivers of Health     Financial Resource Strain: High Risk (1/1/2022)    Received from [email or web address]    Financial Resource Strain      Difficulty of Paying Living Expenses: Not on file      Difficulty of Paying Living Expenses: Not on file   Food Insecurity: Not on file   Transportation Needs: Not on file   Physical Activity: Not on file   Stress: Not on file   Social Connections: Unknown (1/1/2022)    Received from [email or web address]    Social Connections      Frequency of Communication with Friends and Family: Not on file   Interpersonal Safety: Low Risk  (12/6/2024)    Interpersonal Safety      Do you feel physically and emotionally safe where you currently live?: Yes      Within the past 12 months, have you been hit, slapped, kicked or otherwise physically hurt by someone?: No      Within the past 12 months, have you been humiliated or emotionally abused in other ways by your partner or ex-partner?: No   Housing Stability: Not on file       No family history on file.  Family history reviewed and edited as appropriate    Medications and Allergies:     Outpatient Encounter  Medications as of 7/7/2025   Medication Sig Dispense Refill     albuterol (PROAIR HFA/PROVENTIL HFA/VENTOLIN HFA) 108 (90 Base) MCG/ACT inhaler 2 puffs every 4 hours as needed       amitriptyline (ELAVIL) 10 MG tablet Take medication as follows: 10 mg at bedtime for week 1 and week 2,15 mg at bedtime for week 3 and week 4,20 mg at bedtime for week 5 and week 6, 25 mg at bedtime for week 7 and week 8 98 tablet 0     benzonatate (TESSALON) 100 MG capsule Take 1 capsule (100 mg) by mouth 3 times daily as needed for cough 30 capsule 4     FLUoxetine (PROZAC) 20 MG capsule        fluticasone-vilanterol (BREO ELLIPTA) 100-25 MCG/ACT inhaler        hydrOXYzine (ATARAX) 10 MG tablet        sildenafil (REVATIO) 20 MG tablet TAKE 1-5 TABLETS BY MOUTH 30 MINS TO 4 HOURS BEFORE SEXUAL ACTIVITY       tiotropium (SPIRIVA RESPIMAT) 2.5 MCG/ACT inhaler Inhale 2 puffs into the lungs       [DISCONTINUED] LANsoprazole (PREVACID) 30 MG DR capsule Take 30 mg by mouth       [DISCONTINUED] omeprazole (PRILOSEC) 20 MG DR capsule TAKE 2 CAPSULES(40 MG) BY MOUTH TWICE DAILY       [DISCONTINUED] pantoprazole (PROTONIX) 40 MG EC tablet Take 1 tablet (40 mg) by mouth 2 times daily 180 tablet 2     [DISCONTINUED] pregabalin (LYRICA) 75 MG capsule 1 tab in the morning, 1 tab midday, 2 tabs in the evening, for 1 month 120 capsule 2     [DISCONTINUED] pregabalin (LYRICA) 75 MG capsule Take 1 capsule (75 mg) by mouth daily for 2 days, THEN 1 capsule (75 mg) 2 times daily for 2 days, THEN 1 capsule (75 mg) 3 times daily for 42 days. 132 capsule 0     Facility-Administered Encounter Medications as of 7/7/2025   Medication Dose Route Frequency Provider Last Rate Last Admin     botulinum toxin type A (BOTOX) 100 units injection 6 Units  6 Units Intramuscular Q90 Days Kathy De Paz MD   2.5 Units at 03/14/25 0843        No Known Allergies     Review of systems:  A full 10 point review of systems was obtained and was negative except for the pertinent  "positives and negatives stated within the HPI.    Objective Findings:   Physical Exam:    Constitutional: /86   Pulse 72   Resp 14   Ht 1.778 m (5' 10\")   Wt 96.6 kg (213 lb)   SpO2 98%   BMI 30.56 kg/m    General: Alert, cooperative, no distress, well-appearing  Head: Atraumatic, normocephalic, no obvious abnormalities   Eyes: Sclera anicteric, no obvious conjunctival hemorrhage   Nose: Nares normal, no obvious malformation, no obvious rhinorrhea   Respiratory: Resting comfortably, no apparent distress, no cough.   Gastrointestinal: Flat non distended  Skin: No jaundice, no obvious rash  Neurologic: AAOx3, no obvious neurologic abnormality  Psychiatric: Normal Affect, appropriate mood  Extremities: No obvious edema, no obvious malformation     Labs, Radiology, Pathology     Lab Results   Component Value Date    HGB 13.6 05/14/2025    ALT 46 06/03/2025    AST 25 06/03/2025        Liver Function Studies -   Recent Labs   Lab Test 06/03/25  1049   ALBUMIN 3.5   ALKPHOS 67   AST 25   ALT 46          Patient Active Problem List    Diagnosis Date Noted     Severe episode of recurrent major depressive disorder, without psychotic features (H) 04/24/2025     Priority: Medium     Chronic cough 07/09/2024     Priority: Medium      Assessment and Plan   Assessment/Plan:    Aris Colon is a 55 year old male with significant past medical history pertinent for depression and chronic cough who is presenting as a new patient in consultation at the request of Dr. De Paz with a chief complaint of chronic cough.    #Chronic Cough     Patient is known to Dr. 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  Symptoms were thought to be secondary to irritable larynx syndrome versus LPR and an TONO evaluation was also recommended.  He has previously underwent the various treatments including SLN " block, Botox injection x2, Tessalon Perles and trial of Lyrica 75 Mg 1 tab in the morning, 1 tab midday and 2 tabs in the evening without symptomatic improvement.  Patient was then offered trial of amitriptyline Lyrica has not been helpful which he had gradually titrated to 15 mg nightly at bedtime however has since discontinued secondary to increased aggression/behavioral disturbances.  From an acid suppressive standpoint he has trialed Lansoprazole 30 mg daily, Pantoprazole 40 mg BID and Omeprazole 20 mg BID without symptomatic improvement. Currently is established with behavioral health psychology Jacoby PEARSON and is scheduled  with Livan Wood PhD LP through behavioral health psychology September 2025.    4/29/2025 patient was discussed at [facility] with ENT, GI and SLP teams with presenting symptoms of chronic cough.  Patient has previously participated in voice therapy without symptomatic improvement.  Recommendations were for GI referral.     Today Derrick presents with symptoms of a chronic cough onset after environmental exposure.  Exacerbating factors are predominantly that of olfactory triggers as well as environmental changes from hot to cold air and humidity.  Reviewed different with GI perspective that further evaluation could be completed to evaluate for reflux disease as well as esophageal dysmotility resulting in his current symptoms.  Although he denies classic symptoms of reflux disease as well as dysphagia/odynophagia.  We reviewed the options of obtaining a timed barium esophagram with tablet, high-resolution manometry and upper endoscopic evaluation with 96-hour Bravo off therapy.  Patient is agreeable to a timed barium esophagram with tablet as well as EGD with Bravo and pending results would then consider high-resolution manometry.    Ultimately my suspicion is for irritable larynx syndrome vs a DBGI variant contributing to symptoms.    - Continue follow-up with established licensed therapist  and await consultation with behavioral health psychology  - Future considerations would be consultation to psychiatry  - Reflux friendly lifestyle modifications as directed within the AVS  - Timed barium esophagram with tablet. To schedule imaging, please call [phone]   - Upper endoscopic evaluation +/- dilation with 96 hour BRAVO study to be completed off acid suppressive therapy.  Please hold your acid suppressive regimen for a minimum of 2 weeks prior to the procedure  - Results from the above procedures have been taking approximately 4 to 6 weeks to be finalized.  Please follow-up in clinic accordingly      Follow up plan:   Return to clinic 4 months and as needed.    The risks and benefits of my recommendations, as well as other treatment options were discussed with the patient and any available family today. All questions were answered.     Follow up: As planned above. Today, I personally spent 28 minutes in direct face to face time with the patient. Approximately 18 minutes were spent on indirect care associated with the patient's consultation including but not limited to review of: patient medical records to date, clinic visits, hospital records, lab results, imaging studies, procedural documentation, coordinating care with other providers and further activities per the note. The findings from this review are summarized in the above note. All of the above accounted for a cumulative time of 46 minutes and was performed on the date of service.     The patient verbalized understanding of the plan and was appreciative for the time spent and information provided during the office visit.           Krystyna Whittaker PA-C  Division of Gastroenterology, Hepatology, and Nutrition  [facility]       Documentation assisted by voice recognition and documentation system.            Again, thank you for allowing me to participate in the care of your patient.        Sincerely,        Krystyna Whittaker,  LUPE    Electronically signed

## 2025-07-07 NOTE — PATIENT INSTRUCTIONS
It was a pleasure meeting with you today and discussing your healthcare plan. Below is a summary of what we covered:    - Reflux friendly lifestyle modifications as directed within the AVS  - Timed barium esophagram with tablet. To schedule imaging, please call [phone]   - Upper endoscopic evaluation +/- dilation with 96 hour BRAVO study to be completed off acid suppressive therapy.  Please hold your acid suppressive regimen for a minimum of 2 weeks prior to the procedure  - Results from the above procedures have been taking approximately 4 to 6 weeks to be finalized.  Please follow-up in clinic accordingly    Gastroesophageal Reflux Disease (GERD) Lifestyle Modifications:   If taking acid suppression therapy (PPI ie Pantoprazole, Lansoprazole, Omeprazole, Esomeprazole, Rabeprazole, Dexlansoprazole) it should be taken 30 - 60 minutes prior to meals on an empty stomach to have maximum effect  Avoid triggers for reflux such as coffee, chocolate, carbonated beverages, spicy foods, acidic foods (tomato based/citrus and foods with high fat content   Abstinence from alcohol and cessation of all tobacco products is recommended   Studies have shown that weight loss, exercise and maintaining a healthy BMI significantly reduce GERD symptoms   Remain upright while eating and immediately after meals  Do not eat or drink at least 3 hours prior to laying down supine/laying down for bed   Avoid late night/middle of the night snacking    Consider obtaining a wedge pillow or elevating the head end of the bed while sleeping   Avoid sleeping right side down as this can place the lower part of the esophagus/lower esophageal sphincter in a dependent position that favors reflux   Attempting to eat smaller more frequent meals may improve symptoms         Please call my nurse Denise ([phone]) or Sonya ([phone] ) with any questions or concerns.        See below for any additional questions and scheduling  guidelines.    Sign up for Boxed: Boxed patient portal serves as a secure platform for accessing your medical records from the [facility]. Additionally, Boxed facilitates easy, timely, and secure messaging with your care team. If you have not signed up, you may do so by using the provided code or calling [phone].    Coordinating your care after your visit:  There are multiple options for scheduling your follow-up care based on your provider's recommendation.    How do I schedule a follow-up clinic appointment:   After your appointment, you may receive scheduling assistance with the Clinic Coordinators by having a seat in the waiting room and a Clinic Coordinator will call you up to schedule.  Virtual visits or after you leave the clinic:  Your provider has placed a follow-up order in the Boxed portal for scheduling your return appointment. A member of the scheduling team will contact you to schedule.  Boxed Scheduling: Timely scheduling through Boxed is advised to ensure appointment availability.   Call to schedule: You may schedule your follow-up appointment(s) by calling [phone], option 1.    How do I schedule my endoscopy or colonoscopy procedure:  If a procedure, such as a colonoscopy or upper endoscopy was ordered by your provider, the scheduling team will contact you to schedule this procedure. Or you may choose to call to schedule at   [phone], option 2.  Please allow 20-30 minutes when scheduling a procedure.    How do I get my blood work done? To get your blood work done, you need to schedule a lab appointment at an [facility]. There are multiple ways to schedule:   At the clinic: The Clinic Coordinator you meet after your visit can help you schedule a lab appointment.   Boxed scheduling: Boxed offers online lab scheduling at all [facility] laboratory locations.   Call to schedule: You can call [phone] to schedule your lab  appointment.    How do I schedule my imaging study: To schedule imaging studies, such as CT scans, ultrasounds, MRIs, or X-rays, contact Imaging Services at [phone].    How do I schedule a referral to another doctor: If your provider recommended a referral to another specialist(s), the referral order was placed by your provider. You will receive a phone call to schedule this referral, or you may choose to call the number attached to the referral to self-schedule.    For Post-Visit Question(s):  For any inquiries following today's visit:  Please utilize Kiwi messaging and allow 48 hours for reply or contact the Call Center during normal business hours at [phone], option 3.  For Emergent After-hours questions, contact the On-Call GI Fellow through the [facility] at [phone].  In addition, you may contact your Nurse directly using the provided contact information.    Test Results:  Test results will be accessible via Kiwi in compliance with the 21st Century Cures Act. This means that your results will be available to you at the same time as your provider. Often you may see your results before your provider does. Results are reviewed by staff within two weeks with communication follow-up. Results may be released in the patient portal prior to your care team review.    Prescription Refill(s):  Medication prescribed by your provider will be addressed during your visit. For future refills, please coordinate with your pharmacy. If you have not had a recent clinic visit or routine labs, for your safety, your provider may not be able to refill your prescription.     Clinic Fax Number: [phone]        Sincerely,    Krystyna Whittaker PA-C  Division of Gastroenterology, Hepatology, and Nutrition  [facility]

## 2025-07-07 NOTE — PROGRESS NOTES
"Gastroenterology Visit for: Aris Colon 1970   MRN: [id number]     Reason for Visit:  chief complaint    Referred by: [facility]  / [address] [place] [address] / [facility] [id number]  Patient Care Team:  Juventino, Mena Núñez as PCP - General  Kathy De Paz MD as MD (Otolaryngology)  Bob Ruvalcaba PsyD as Assigned Sleep Provider  Kathy De Paz MD as Assigned Surgical Provider  Ora Shepard SLP as Speech Language Pathologist (Speech Language/Path)  Jacoby Diallo LMFT as Assigned Behavioral Health Provider    History of Present Illness:   Aris Colon is a 55 year old male with significant past medical history pertinent for depression and chronic cough who is presenting as a new patient in consultation at the request of Dr. De Paz with a chief complaint of chronic cough.    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      Amitriptyline - No symptomatic improvement. Noting \"quick anger and quick to temper.\" Currently taking 15 mg. Will be escalate to 20 mg for weeks 4/5.     Not taking PPI.     Reports significant impacts to quality of life.     Consumes 1 Pepsi per day 12 oz. No increase in symptoms with consumption.     ETOH - over the past year has consumed 2-3 beers    Denies use of consumption of coffee, ETOH, no history of tobacco use and cannabinoid products. No recreational drug use.     No " family history or GI related malignancy (esophageal, gastric, pancreatic, liver or colon).     No allergy to Nickel.       Esophageal Questionnaire(s)    BEDQ Questionnaire       No data to display                   No data to display                Eckardt Questionnaire       No data to display                Promis 10 Questionnaire      9/12/2023     1:00 PM 5/20/2025     1:04 PM   PROMIS 10 FLOWSHEET DATA   In general, would you say your health is: 2 2   In general, would you say your quality of life is: 2 2   In general, how would you rate your physical health? 2 2   In general, how would you rate your mental health, including your mood and your ability to think? 2 2   In general, how would you rate your satisfaction with your social activities and relationships? 1 2   In general, please rate how well you carry out your usual social activities and roles. (This includes activities at home, at work and in your community, and responsibilities as a parent, child, spouse, employee, friend, etc.) 1 2   To what extent are you able to carry out your everyday physical activities such as walking, climbing stairs, carrying groceries, or moving a chair? 2 1   In the past 7 days, how often have you been bothered by emotional problems such as feeling anxious, depressed, or irritable? 5 4   In the past 7 days, how would you rate your fatigue on average? 3 4   In the past 7 days, how would you rate your pain on average, where 0 means no pain, and 10 means worst imaginable pain? 0 7   Mental health question re-calculation - no clinical value 1 2    Physical health question re-calculation - no clinical value 3 2    Pain question re-calculation - no clinical value 5 2    Global Mental Health Score 6 8    Global Physical Health Score 12 7    PROMIS TOTAL - SUBSCORES 18 15        Patient-reported           STUDIES & PROCEDURES:    EGD:       Colonoscopy:     EndoFLIP directed at the UES or LES (8cm (EF-325) balloon length or 16cm  (BQ-322) balloon length):   Date:  8cm balloon  Balloon inflation Balloon pressure CSA (mm^2) DI (mm^2/mmHg) Dmin (mm) Compliance   20 (ladmark ID)        30        40        50           16cm balloon  Balloon inflation Balloon pressure CSA (mm^2) DI (mm^2/mmHg) Dmin (mm) Compliance   30 (ladmark ID)        40        50        60        70           High Resolution Manometry:    PH/Impedance:     Bravo:    CT:    Esophagram:    9/13/2023  ESOPHAGUS: Normal. Normal peristalsis. No strictures, masses, or inflammatory changes. No gastroesophageal reflux in the recumbent position.                                                                   IMPRESSION:      Normal esophagram.    FL VSS:     9/13/2025  FINDINGS:   FLUOROSCOPIC TIME: 0.4 minutes  NUMBER OF IMAGES: 4 videofluoroscopic imaging series.     Swallow study with Speech Pathology using multiple barium thicknesses.      Developmental fusion of C5 and C6 at the disc space. Anterior osteophytes C4-C5 and C6-C7.     No penetration or aspiration with thin consistency or puree consistency. Normal epiglottic movement.                                                                      IMPRESSION:     Essentially normal swallow study.     Please see separately dictated report from speech pathology for additional description, analysis, and management recommendations.    GES:    U/S:     XRAY:    Other:       Prior medical records were reviewed including, but not limited to, notes from referring providers, lab work, radiographic tests, and other diagnostic tests. Pertinent results were summarized above.     History   No past medical history on file.    Past Surgical History:   Procedure Laterality Date    LARYNGOSCOPY, FLEXIBLE WITH INJECTION N/A 12/6/2024    Procedure: LARYNGOSCOPY, FLEXIBLE WITH INJECTION with botox;  Surgeon: Kathy De Paz MD;  [place] [facility] [place]       Social History     Socioeconomic History    Marital status:      Spouse name: Not on file     Number of children: Not on file    Years of education: Not on file    Highest education level: Not on file   Occupational History    Not on file   Tobacco Use    Smoking status: Never     Passive exposure: Never    Smokeless tobacco: Never   Vaping Use    Vaping status: Never Used   Substance and Sexual Activity    Alcohol use: Not on file    Drug use: Not on file    Sexual activity: Not on file   Other Topics Concern    Not on file   Social History Narrative    Not on file     Social Drivers of Health     Financial Resource Strain: High Risk (1/1/2022)    Received from [facility]    Financial Resource Strain     Difficulty of Paying Living Expenses: Not on file     Difficulty of Paying Living Expenses: Not on file   Food Insecurity: Not on file   Transportation Needs: Not on file   Physical Activity: Not on file   Stress: Not on file   Social Connections: Unknown (1/1/2022)    Received from [facility]    Social Connections     Frequency of Communication with Friends and Family: Not on file   Interpersonal Safety: Low Risk  (12/6/2024)    Interpersonal Safety     Do you feel physically and emotionally safe where you currently live?: Yes     Within the past 12 months, have you been hit, slapped, kicked or otherwise physically hurt by someone?: No     Within the past 12 months, have you been humiliated or emotionally abused in other ways by your partner or ex-partner?: No   Housing Stability: Not on file       No family history on file.  Family history reviewed and edited as appropriate    Medications and Allergies:     Outpatient Encounter Medications as of 7/7/2025   Medication Sig Dispense Refill    albuterol (PROAIR HFA/PROVENTIL HFA/VENTOLIN HFA) 108 (90 Base) MCG/ACT inhaler 2 puffs every 4 hours as needed      amitriptyline (ELAVIL) 10 MG tablet Take medication as follows: 10 mg at bedtime for week 1 and week 2,15 mg at bedtime for week 3  "and week 4,20 mg at bedtime for week 5 and week 6, 25 mg at bedtime for week 7 and week 8 98 tablet 0    benzonatate (TESSALON) 100 MG capsule Take 1 capsule (100 mg) by mouth 3 times daily as needed for cough 30 capsule 4    FLUoxetine (PROZAC) 20 MG capsule       fluticasone-vilanterol (BREO ELLIPTA) 100-25 MCG/ACT inhaler       hydrOXYzine (ATARAX) 10 MG tablet       sildenafil (REVATIO) 20 MG tablet TAKE 1-5 TABLETS BY MOUTH 30 MINS TO 4 HOURS BEFORE SEXUAL ACTIVITY      tiotropium (SPIRIVA RESPIMAT) 2.5 MCG/ACT inhaler Inhale 2 puffs into the lungs      [DISCONTINUED] LANsoprazole (PREVACID) 30 MG DR capsule Take 30 mg by mouth      [DISCONTINUED] omeprazole (PRILOSEC) 20 MG DR capsule TAKE 2 CAPSULES(40 MG) BY MOUTH TWICE DAILY      [DISCONTINUED] pantoprazole (PROTONIX) 40 MG EC tablet Take 1 tablet (40 mg) by mouth 2 times daily 180 tablet 2    [DISCONTINUED] pregabalin (LYRICA) 75 MG capsule 1 tab in the morning, 1 tab midday, 2 tabs in the evening, for 1 month 120 capsule 2    [DISCONTINUED] pregabalin (LYRICA) 75 MG capsule Take 1 capsule (75 mg) by mouth daily for 2 days, THEN 1 capsule (75 mg) 2 times daily for 2 days, THEN 1 capsule (75 mg) 3 times daily for 42 days. 132 capsule 0     Facility-Administered Encounter Medications as of 7/7/2025   Medication Dose Route Frequency Provider Last Rate Last Admin    botulinum toxin type A (BOTOX) 100 units injection 6 Units  6 Units Intramuscular Q90 Days Kathy De Paz MD   2.5 Units at 03/14/25 0843        No Known Allergies     Review of systems:  A full 10 point review of systems was obtained and was negative except for the pertinent positives and negatives stated within the HPI.    Objective Findings:   Physical Exam:    Constitutional: /86   Pulse 72   Resp 14   Ht 1.778 m (5' 10\")   Wt 96.6 kg (213 lb)   SpO2 98%   BMI 30.56 kg/m    General: Alert, cooperative, no distress, well-appearing  Head: Atraumatic, normocephalic, no obvious " abnormalities   Eyes: Sclera anicteric, no obvious conjunctival hemorrhage   Nose: Nares normal, no obvious malformation, no obvious rhinorrhea   Respiratory: Resting comfortably, no apparent distress, no cough.   Gastrointestinal: Flat non distended  Skin: No jaundice, no obvious rash  Neurologic: AAOx3, no obvious neurologic abnormality  Psychiatric: Normal Affect, appropriate mood  Extremities: No obvious edema, no obvious malformation     Labs, Radiology, Pathology     Lab Results   Component Value Date    HGB 13.6 05/14/2025    ALT 46 06/03/2025    AST 25 06/03/2025        Liver Function Studies -   Recent Labs   Lab Test 06/03/25  1049   ALBUMIN 3.5   ALKPHOS 67   AST 25   ALT 46          Patient Active Problem List    Diagnosis Date Noted    Severe episode of recurrent major depressive disorder, without psychotic features (H) 04/24/2025     Priority: Medium    Chronic cough 07/09/2024     Priority: Medium      Assessment and Plan   Assessment/Plan:    Aris Colon is a 55 year old male with significant past medical history pertinent for depression and chronic cough who is presenting as a new patient in consultation at the request of Dr. De Paz with a chief complaint of chronic cough.    #Chronic Cough     Patient is known to Dr. 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  Symptoms were thought to be secondary to irritable larynx syndrome versus LPR and an TONO evaluation was also recommended.  He has previously underwent the various treatments including SLN block, Botox injection x2, Tessalon Perles and trial of Lyrica 75 Mg 1 tab in the morning, 1 tab midday and 2 tabs in the evening without symptomatic improvement.  Patient was then offered trial of amitriptyline Lyrica has not been helpful which he had gradually titrated to 15 mg nightly at bedtime however has since  discontinued secondary to increased aggression/behavioral disturbances.  From an acid suppressive standpoint he has trialed Lansoprazole 30 mg daily, Pantoprazole 40 mg BID and Omeprazole 20 mg BID without symptomatic improvement. Currently is established with behavioral health psychology Jacoby PEARSON and is scheduled  with Livan Wood PhD LP through behavioral health psychology September 2025.    4/29/2025 patient was discussed at [facility] with ENT, GI and SLP teams with presenting symptoms of chronic cough.  Patient has previously participated in voice therapy without symptomatic improvement.  Recommendations were for GI referral.     Today Derrick presents with symptoms of a chronic cough onset after environmental exposure.  Exacerbating factors are predominantly that of olfactory triggers as well as environmental changes from hot to cold air and humidity.  Reviewed different with GI perspective that further evaluation could be completed to evaluate for reflux disease as well as esophageal dysmotility resulting in his current symptoms.  Although he denies classic symptoms of reflux disease as well as dysphagia/odynophagia.  We reviewed the options of obtaining a timed barium esophagram with tablet, high-resolution manometry and upper endoscopic evaluation with 96-hour Bravo off therapy.  Patient is agreeable to a timed barium esophagram with tablet as well as EGD with Bravo and pending results would then consider high-resolution manometry.    Ultimately my suspicion is for irritable larynx syndrome vs a DBGI variant contributing to symptoms.    - Continue follow-up with established licensed therapist and await consultation with behavioral health psychology  - Future considerations would be consultation to psychiatry  - Reflux friendly lifestyle modifications as directed within the AVS  - Timed barium esophagram with tablet. To schedule imaging, please call [phone]   - Upper endoscopic evaluation +/-  dilation with 96 hour BRAVO study to be completed off acid suppressive therapy.  Please hold your acid suppressive regimen for a minimum of 2 weeks prior to the procedure  - Results from the above procedures have been taking approximately 4 to 6 weeks to be finalized.  Please follow-up in clinic accordingly      Follow up plan:   Return to clinic 4 months and as needed.    The risks and benefits of my recommendations, as well as other treatment options were discussed with the patient and any available family today. All questions were answered.     Follow up: As planned above. Today, I personally spent 28 minutes in direct face to face time with the patient. Approximately 18 minutes were spent on indirect care associated with the patient's consultation including but not limited to review of: patient medical records to date, clinic visits, hospital records, lab results, imaging studies, procedural documentation, coordinating care with other providers and further activities per the note. The findings from this review are summarized in the above note. All of the above accounted for a cumulative time of 46 minutes and was performed on the date of service.     The patient verbalized understanding of the plan and was appreciative for the time spent and information provided during the office visit.           Krystyna Whittaker PA-C  Division of Gastroenterology, Hepatology, and Nutrition  [facility]       Documentation assisted by voice recognition and documentation system.

## 2025-07-08 ENCOUNTER — DOCUMENTATION ONLY (OUTPATIENT)
Dept: SPEECH THERAPY | Facility: CLINIC | Age: 55
End: 2025-07-08

## 2025-07-08 NOTE — CONFIDENTIAL NOTE
Video Esophagram Review Rounds  Imaging Review of MBSS conducted with attending physician Kathy De Paz and reviewed/discussed with SLP Jerrica Brown, Rosanna Davies, and/or Cris Leslie and with GI representative, TOM Preciado    Relevant Background: chronic cough     Recommendations:  GI Recommends:  EGD with Bravo  Timed Barium Esophagram   Behavioral Health Psychology referral (already ordered and scheduled)  Pt declined manometry at this time   Wean off Amitriptyline

## 2025-07-16 ENCOUNTER — TELEPHONE (OUTPATIENT)
Dept: GASTROENTEROLOGY | Facility: CLINIC | Age: 55
End: 2025-07-16

## 2025-07-16 NOTE — TELEPHONE ENCOUNTER
"Lucille BHARDWAJ, message sent and will follow up in the morning for available dates    Endoscopy Scheduling Screen    Caller: Paulette     Have you had any respiratory illness or flu-like symptoms in the last 10 days?  No    Patient is ACTIVE on Variable.  Inform patient that all appointment instructions will be sent via Variable.    Review patient's insurance for any non participating payor.    Ordering/Referring Provider: Krystyna Whittaker PA-C   (If ordering provider performs procedure, schedule with ordering provider unless otherwise instructed. )    BMI: Estimated body mass index is 30.56 kg/m  as calculated from the following:    Height as of 7/7/25: 1.778 m (5' 10\").    Weight as of 7/7/25: 96.6 kg (213 lb).     Sedation Ordered  MAC/deep sedation.   BMI<= 45 45 < BMI <= 48 48 < BMI < = 50  BMI > 50   No Restrictions No [facility]  No [facility]  [facility] with exceptions Hospital Only OR Only       Do you have a history of malignant hyperthermia?  No    (Females) Are you currently pregnant?   No     Have you been diagnosed or told you have pulmonary hypertension?   No    Do you have an LVAD?  No    Have you been told you have moderate to severe sleep apnea?  No.    Have you been told you have COPD, asthma, or any other lung disease?  No    Has your doctor ordered any cardiac tests like echo, angiogram, stress test, ablation, or EKG, that you have not completed yet?  No    Do you  have a history of any heart conditions?  No     Have you ever had or are you waiting for an organ transplant?  No. Continue scheduling, no site restrictions.    Have you had a stroke or transient ischemic attack (TIA aka \"mini stroke\") in the last 2 years?   No.    Have you been diagnosed with or been told you have cirrhosis of the liver?   No.    Are you currently on dialysis?   No    Do you need assistance transferring?   No    BMI: Estimated body mass index is 30.56 kg/m  as calculated from the following:    Height as of 7/7/25: " "1.778 m (5' 10\").    Weight as of 7/7/25: 96.6 kg (213 lb).     Is patients BMI > 40 and scheduling location [facility]?  No    Do you take an injectable or oral medication for weight loss or diabetes (excluding insulin)?  No    Do you take the medication Naltrexone?  No    Do you take blood thinners?  No       Prep   Are you currently on dialysis or do you have chronic kidney disease?  No    Do you have a diagnosis of diabetes?  No    Do you have a diagnosis of cystic fibrosis (CF)?  No    On a regular basis do you go 3 -5 days between bowel movements?  No    BMI > 40?  No    Preferred Pharmacy:    [id number] [place], [place] - [address] [place] [address] AT [address]  [address] [facility] [address]  Phone: [phone] Fax: [phone]    Final Scheduling Details     Procedure scheduled  Upper endoscopy with BRAVO capsule placement    "

## 2025-07-16 NOTE — TELEPHONE ENCOUNTER
Final Scheduling Details     Procedure scheduled  Upper endoscopy with BRAVO capsule placement    Surgeon:  Les     Date of procedure:  10/14     Pre-OP / PAC:   No - Not required for this site.    Location  [facility] - Per order.    Sedation   MAC/Deep Sedation - Per order.      Patient Reminders:   You will receive a call from a Nurse to review instructions and health history.  This assessment must be completed prior to your procedure.  Failure to complete the Nurse assessment may result in the procedure being cancelled.      On the day of your procedure, please designate an adult(s) who can drive you home stay with you for the next 24 hours. The medicines used in the exam will make you sleepy. You will not be able to drive.      You cannot take public transportation, ride share services, or non-medical taxi service without a responsible caregiver.  Medical transport services are allowed with the requirement that a responsible caregiver will receive you at your destination.  We require that drivers and caregivers are confirmed prior to your procedure.

## 2025-07-21 ENCOUNTER — VIRTUAL VISIT (OUTPATIENT)
Dept: OTOLARYNGOLOGY | Facility: CLINIC | Age: 55
End: 2025-07-21
Payer: OTHER MISCELLANEOUS

## 2025-07-21 DIAGNOSIS — R05.3 CHRONIC COUGH: Primary | ICD-10-CM

## 2025-07-21 NOTE — PATIENT INSTRUCTIONS
"Hello!     It was a pleasure to see you today. Please complete the exercises below and remember, a few minutes of practice many times throughout the day is more important than one large practice session. If you have any questions about your strategies, feel free to contact me at [email or web address] or via [email or web address]. Please call [phone] for scheduling alterations or to be seen sooner in case of cancellations.      Home Exercise Program:  AIR FLOW  Try changing how the air flows in and out through your nose and mouth.   You can play with:  whether your molars are closed or .  Where your tongue tip is resting, bottom of your mouth, back of your teeth, roof of your mouth  Whether the tongue is in the bottom of your mouth or suctioned to the roof  Whether you breathe through your nose or your mouth  These will all create slightly different air flow patterns. You may find that none of them make much difference, or you may find that changing the air flow in _____ way causes less irritation. So switch between these and see if any one or two patterns actually feels better to you.     ____________________________________________________       SIP & BALLOON  When you start to cough:   SNIFF BREATH IN  (OR noodle slurp) & BALLOON CHEEKS OUT, SNIFF & BALLOON, SNIFF & BALLOON...  FEEL THE AIR FLOWING OUT AGAINST YOUR FINGERS/HAND  Chest up and shoulders back, belly crunch   ____________________________________________________       BUZZY VOICE- ALL .6Hz or C#3  Practice throughout the day, whenever you get a chance. Your hands can rest of your belly, but not pull.   Conversation, talking to yourself around the house, road signs/license plates, 20 questions, etc.     Chanted on 1 pitch (138.59Hz or C#3) for now, like a monk or robot.   Focus on \"nasal\" or \"twangy\" vibrations in your lips/nose on \"mmm\"  Monitor your belly muscles with your hands, without pulling.  If you feel a tickle, SNIFF & Balloon " until the tickle goes away.      Thank you and have a great day!  Rosendo Marmolejo. (voice), M.S., CCC-SLP  Speech-Language Pathologist  [facility]  [phone]  [email or web address]  Pronouns: she/her/hers

## 2025-07-21 NOTE — LETTER
7/21/2025       RE: Aris Colon  [address] [place]  [facility] [address]     Dear Colleague,    Thank you for referring your patient, Aris Colon, to the [facility] [place] [facility]. Please see a copy of my visit note below.    Derrick Colon is a 55 year old male who is being evaluated via a billable video visit.      Derrick has been notified and verbally consented to the following:   This video visit will be conducted between you and your provider.  Patient has opted to conduct today's video visit vs an in-person appointment.   Video visits are billed at different rates depending on your insurance coverage. Please reach out to your insurance provider with any questions.   If during the course of the call the provider feels the appointment is not appropriate, you will not be charged for this service.  Provider has received verbal consent for billable virtual visit from the patient? Yes  Will anyone else be joining your video visit? No    Call initiated at: 1500   Type of Visit Platform Used: [employer] Video  Location of provider: Home  Location of patient: [facility]ICE [facility]IC  PROGRESS REPORT (CPT 95463)    Patient: Aris Colon  Date of Service: 7/21/2025  Date of Last Service: 2/11/25  Number of Visits: 22/28 approved  Referring Physician: Dr. Kathy De Paz    Impressions from evaluation on 11/17/23 by Mark Dutta M.S., CCC-SLP:  Derrick Colon presents today with ongoing chronic cough which began in conjunction with workplace particle exposure, and with recent exacerbation including hemoptysis and emesis.  Laryngeal evaluation today was warranted given recent episodes of hemoptysis to ensure the patient was not recovering from hemorrhagic or ulcerative event.  This demonstrated no acute trauma which is fortunate, though diffuse change to the vocal folds and surrounding tissues from  longstanding cough was seen consistent with previous evaluation if slightly worsened.  Cough response was aggressive to the application of oxymetazoline nasal spray, and guarding and light cough was noted to occur even prior to the administration, which underscores both the hypersensitivity as well as the stress base response to triggers that has developed over time.  Within the exam itself patient was able to attenuate cough through focused relaxation which bodes well for his ability to incorporate therapeutic strategies to reduce cough, though as we discussed full resolution of symptoms is often challenging due to their multifactorial nature especially in the case of irritant exposure.      SUBJECTIVE:  Since Derrick's last session, he reports the following:   He's still working on weaning off the amitriptyline.   He reports nothing has changed with his cough. He's still triggered by heat, scents, fumes in the air, etc.   He had two sessions of psychotherapy with Jacoby Diallo, but hasn't found any helpful strategies.   He was just told by his  that insurance has approved his GI tests, though only the esophagram is scheduled.   He noes it's hard to use the suppression techniques when his body wants to expel the cough.   He's looking into a second opinion at [facility].   He tries to speak softly or do lip trills, but states it's not easy to talk in a higher voice.   He's asked his employer to prevent coworkers to not wear fragrances.  He's not able to turn the a/c on if the air pollution is high, otherwise he has to wear a mask inside.     OBJECTIVE:  Patient Specific Goal Metrics:      9/10/2024    10:00 AM 11/25/2024    12:00 PM 1/8/2025    10:00 AM   Dysponia SLP Goals   How severe is your cough /throat clearing, if 0 is no cough at all and 10 is the worst cough? 7 7 8   How much does your cough/throat-clearing problem bother you?            Quite a bit Very Much Quite a bit     FO:  92-98Hz    THERAPEUTIC ACTIVITIES  Today Derrick participated in the following therapeutic activities:  Counseling and Education:  Asked questions about the nature of his symptoms, and I answered all of these thoroughly.  Conversation regarding ongoing therapy attempts, provided he does not feel that strategies have been helpful so far provider is hopeful increased consistency and carryover of phonatory patterns help reduce cough in the future.    I provided Derrick with explanation and skilled instruction of:  Strategies targeting alternative airflow pathways, in an attempt to decrease tickles and cough triggers in the throat  He endorsed the subtle difference felt between strategies and agreed to trial of these methods to see if 1 or several may be helpful  Resonant Voice Therapy (RVT) exercises to promote forward locus of resonance and optimized pattern of laryngeal adduction  Easy descending glide on /m/ and /n/ utilized in conjunction with relaxed jaw, tongue, and lightly closed lips to facilitate forward resonant sound  Syllable, word, and chanted conversational level exercises featuring nasal continuant loaded stimuli  He demonstrated significant improvement in target voice production while in exercise mode, however has not yet been able to consistently generalize this to spontaneous productions in his day-to-day  Phrase level exercises featuring nasal continuants in more complex phonemic contexts were employed  Good accuracy with moderate support  Tendency towards lower than optimal pitch    Instruction of Home Practice:  A revised regimen for home practice was instructed.  I provided an AVS of important notes of today's therapeutic activities to facilitate practice.    ASSESSMENT/PLAN  Progress toward long-term goals:  Adequate but incomplete progress; please see above    Impressions:  IMPRESSIONS: chronic cough   Derrick had a productive session of therapy today, working on a review of resonant voice tasks  targeting 139 Hz and alternative airflow pathways.  He will continue to work on his exercises on a daily basis, and work on incorporating the techniques into his daily activities. Speech therapy continues to be medically necessary for Derrick to participate fully and engage in activities of daily living.     Plan:   I will see Derrick in 2 weeks and will plan to continue reinforcing chanted C#3 spontaneous speech to more natural speaking inflection.  Check on alternative airflow options.     Next Clinic Appt: 7/31 with Dr. De Paz  Plan for SLP: I will plan to join for session, likely re-evaluating given the duration since his last evaluation.    For home practice goals, see AVS.     TOTAL SERVICE TIME: 60 minutes  TREATMENT (94015)    Milagros Landa (voice), M.S., CCC-SLP  Speech-Language Pathologist  [facility]  [phone]  [email or web address]  Pronouns: she/her/hers      *this report was created in part through the use of computerized dictation software, and though reviewed following completion, some typographic errors may persist.  If there is confusion regarding any of this notes contents, please contact me for clarification    OBJECTIVE FINDINGS  PATIENT REPORTED MEASURES  Patient Supplied Answers To Last 2 VHI Questionnaires       No data to display                   Patient Supplied Answers To Last 2 CSI Questionnaires      12/18/2024     9:45 AM 1/8/2025    10:00 AM   Cough Severity Index (CSI)   My cough is worse when I lie down 2 2   My coughing problem causes me to restrict my personal and social life 4 3   I tend to avoid places because of my cough problem 4 4   I feel embarrassed because of my coughing problem 4 4   People ask, ''What's wrong?'' because I cough a lot 4 4   I run out of air when I cough 4 3   My coughing problem affects my voice 2 4   My coughing problem limits my physical activity 4 4   My coughing problem upsets me 4 4   People ask me if I am sick because I cough a lot  4 4   CSI Score 36  36       Patient-reported          Patient Supplied Answers To Last 2 EAT Questionnaires       No data to display                  Patient Supplied Answers to Dyspnea Index Questionnaire:       No data to display                 Again, thank you for allowing me to participate in the care of your patient.      Sincerely,    Ora Shepard, SLP

## 2025-07-21 NOTE — PROGRESS NOTES
Derrick Colon is a 55 year old male who is being evaluated via a billable video visit.      Derrick has been notified and verbally consented to the following:   This video visit will be conducted between you and your provider.  Patient has opted to conduct today's video visit vs an in-person appointment.   Video visits are billed at different rates depending on your insurance coverage. Please reach out to your insurance provider with any questions.   If during the course of the call the provider feels the appointment is not appropriate, you will not be charged for this service.  Provider has received verbal consent for billable virtual visit from the patient? Yes  Will anyone else be joining your video visit? No    Call initiated at: 1500   Type of Visit Platform Used: [employer] Video  Location of provider: Home  Location of patient: [facility] VOICE CLINIC  PROGRESS REPORT (CPT [id number])    Patient: Aris Colon  Date of Service: 7/21/2025  Date of Last Service: 2/11/25  Number of Visits: 22/28 approved  Referring Physician: Dr. Kathy De Paz    Impressions from evaluation on 11/17/23 by Mark Dutta M.S., CCC-SLP:  Derrick Colon presents today with ongoing chronic cough which began in conjunction with workplace particle exposure, and with recent exacerbation including hemoptysis and emesis.  Laryngeal evaluation today was warranted given recent episodes of hemoptysis to ensure the patient was not recovering from hemorrhagic or ulcerative event.  This demonstrated no acute trauma which is fortunate, though diffuse change to the vocal folds and surrounding tissues from longstanding cough was seen consistent with previous evaluation if slightly worsened.  Cough response was aggressive to the application of oxymetazoline nasal spray, and guarding and light cough was noted to occur even prior to the administration, which underscores both the hypersensitivity as well as the stress base response to  triggers that has developed over time.  Within the exam itself patient was able to attenuate cough through focused relaxation which bodes well for his ability to incorporate therapeutic strategies to reduce cough, though as we discussed full resolution of symptoms is often challenging due to their multifactorial nature especially in the case of irritant exposure.      SUBJECTIVE:  Since Derrick's last session, he reports the following:   He's still working on weaning off the amitriptyline.   He reports nothing has changed with his cough. He's still triggered by heat, scents, fumes in the air, etc.   He had two sessions of psychotherapy with Jacoby Diallo, but hasn't found any helpful strategies.   He was just told by his  that insurance has approved his GI tests, though only the esophagram is scheduled.   He noes it's hard to use the suppression techniques when his body wants to expel the cough.   He's looking into a second opinion at [facility].   He tries to speak softly or do lip trills, but states it's not easy to talk in a higher voice.   He's asked his employer to prevent coworkers to not wear fragrances.  He's not able to turn the a/c on if the air pollution is high, otherwise he has to wear a mask inside.     OBJECTIVE:  Patient Specific Goal Metrics:      9/10/2024    10:00 AM 11/25/2024    12:00 PM 1/8/2025    10:00 AM   Dysponia SLP Goals   How severe is your cough /throat clearing, if 0 is no cough at all and 10 is the worst cough? 7 7 8   How much does your cough/throat-clearing problem bother you?            Quite a bit Very Much Quite a bit     FO: 92-98Hz    THERAPEUTIC ACTIVITIES  Today Derrick participated in the following therapeutic activities:  Counseling and Education:  Asked questions about the nature of his symptoms, and I answered all of these thoroughly.  Conversation regarding ongoing therapy attempts, provided he does not feel that strategies have been helpful so far provider is  hopeful increased consistency and carryover of phonatory patterns help reduce cough in the future.    I provided Derrick with explanation and skilled instruction of:  Strategies targeting alternative airflow pathways, in an attempt to decrease tickles and cough triggers in the throat  He endorsed the subtle difference felt between strategies and agreed to trial of these methods to see if 1 or several may be helpful  Resonant Voice Therapy (RVT) exercises to promote forward locus of resonance and optimized pattern of laryngeal adduction  Easy descending glide on /m/ and /n/ utilized in conjunction with relaxed jaw, tongue, and lightly closed lips to facilitate forward resonant sound  Syllable, word, and chanted conversational level exercises featuring nasal continuant loaded stimuli  He demonstrated significant improvement in target voice production while in exercise mode, however has not yet been able to consistently generalize this to spontaneous productions in his day-to-day  Phrase level exercises featuring nasal continuants in more complex phonemic contexts were employed  Good accuracy with moderate support  Tendency towards lower than optimal pitch    Instruction of Home Practice:  A revised regimen for home practice was instructed.  I provided an AVS of important notes of today's therapeutic activities to facilitate practice.    ASSESSMENT/PLAN  Progress toward long-term goals:  Adequate but incomplete progress; please see above    Impressions:  IMPRESSIONS: chronic cough   Derrick had a productive session of therapy today, working on a review of resonant voice tasks targeting 139 Hz and alternative airflow pathways.  He will continue to work on his exercises on a daily basis, and work on incorporating the techniques into his daily activities. Speech therapy continues to be medically necessary for Derrick to participate fully and engage in activities of daily living.     Plan:   I will see Derrick in 2 weeks and will  plan to continue reinforcing chanted C#3 spontaneous speech to more natural speaking inflection.  Check on alternative airflow options.     Next Clinic Appt: 7/31 with Dr. De Paz  Plan for SLP: I will plan to join for session, likely re-evaluating given the duration since his last evaluation.    For home practice goals, see AVS.     TOTAL SERVICE TIME: 60 minutes  TREATMENT ([id number])    Milagros Landa (voice), M.S., CCC-SLP  Speech-Language Pathologist  [facility]  [phone]  [email or web address]  Pronouns: she/her/hers      *this report was created in part through the use of computerized dictation software, and though reviewed following completion, some typographic errors may persist.  If there is confusion regarding any of this notes contents, please contact me for clarification    OBJECTIVE FINDINGS  PATIENT REPORTED MEASURES  Patient Supplied Answers To Last 2 VHI Questionnaires       No data to display                   Patient Supplied Answers To Last 2 CSI Questionnaires      12/18/2024     9:45 AM 1/8/2025    10:00 AM   Cough Severity Index (CSI)   My cough is worse when I lie down 2 2   My coughing problem causes me to restrict my personal and social life 4 3   I tend to avoid places because of my cough problem 4 4   I feel embarrassed because of my coughing problem 4 4   People ask, ''What's wrong?'' because I cough a lot 4 4   I run out of air when I cough 4 3   My coughing problem affects my voice 2 4   My coughing problem limits my physical activity 4 4   My coughing problem upsets me 4 4   People ask me if I am sick because I cough a lot 4 4   CSI Score 36  36       Patient-reported          Patient Supplied Answers To Last 2 EAT Questionnaires       No data to display                  Patient Supplied Answers to Dyspnea Index Questionnaire:       No data to display

## 2025-08-04 ENCOUNTER — HOSPITAL ENCOUNTER (OUTPATIENT)
Dept: RADIOLOGY | Facility: HOSPITAL | Age: 55
Discharge: HOME | End: 2025-08-04
Attending: PHYSICIAN ASSISTANT | Admitting: PHYSICIAN ASSISTANT
Payer: OTHER MISCELLANEOUS

## 2025-08-04 DIAGNOSIS — R05.3 CHRONIC COUGH: ICD-10-CM

## 2025-08-04 PROCEDURE — 74220 X-RAY XM ESOPHAGUS 1CNTRST: CPT

## 2025-08-10 ENCOUNTER — HEALTH MAINTENANCE LETTER (OUTPATIENT)
Age: 55
End: 2025-08-10

## 2025-08-25 ENCOUNTER — TELEPHONE (OUTPATIENT)
Dept: GASTROENTEROLOGY | Facility: CLINIC | Age: 55
End: 2025-08-25

## 2025-08-25 DIAGNOSIS — R05.3 CHRONIC COUGH: Primary | ICD-10-CM

## (undated) DEVICE — SPONGE COTTONOID 1/2X3" 20-07S

## (undated) DEVICE — SUCTION TIP YANKAUER W/O VENT K86

## (undated) DEVICE — SYR 03ML LL W/O NDL 309657

## (undated) DEVICE — SYR 01ML 27GA 0.5" NDL TBC 309623

## (undated) DEVICE — SYR 10ML SLIP TIP W/O NDL 303134

## (undated) DEVICE — NDL 30GA 0.5" 305106

## (undated) DEVICE — NDL 18GA 1.5" 305196

## (undated) DEVICE — PEN MARKING SKIN W/LABELS 31145884

## (undated) DEVICE — NDL SCLEROTHERAPY 1.8MM 26GA 200CM M00518160

## (undated) DEVICE — ANTIFOG SOLUTION W/FOAM PAD 31142527

## (undated) DEVICE — DRSG GAUZE 4X4" 3033

## (undated) DEVICE — SUCTION MANIFOLD NEPTUNE 2 SYS 4 PORT 0702-020-000

## (undated) DEVICE — JELLY LUBRICATING SURGILUBE 2OZ TUBE

## (undated) DEVICE — ENDO VALVE SUCTION BRONCH EVIS MAJ-209

## (undated) DEVICE — LINEN TOWEL PACK X5 5464

## (undated) DEVICE — TUBING SUCTION 10'X3/16" N510

## (undated) DEVICE — SOL WATER IRRIG 500ML BOTTLE 2F7113

## (undated) DEVICE — GLOVE BIOGEL PI MICRO SZ 6.5 48565

## (undated) DEVICE — CUP AND LID 2PK 2OZ STERILE  SSK9006A

## (undated) DEVICE — ENDO VALVE BX EVIS MAJ-210

## (undated) DEVICE — KIT ENDO FIRST STEP DISINFECTANT 200ML W/POUCH EP-4

## (undated) RX ORDER — TRIAMCINOLONE ACETONIDE 40 MG/ML
INJECTION, SUSPENSION INTRA-ARTICULAR; INTRAMUSCULAR
Status: DISPENSED
Start: 2024-06-11

## (undated) RX ORDER — LIDOCAINE HYDROCHLORIDE 20 MG/ML
SOLUTION OROPHARYNGEAL
Status: DISPENSED
Start: 2023-03-23

## (undated) RX ORDER — BUPIVACAINE HYDROCHLORIDE 2.5 MG/ML
INJECTION, SOLUTION EPIDURAL; INFILTRATION; INTRACAUDAL
Status: DISPENSED
Start: 2024-06-11

## (undated) RX ORDER — LIDOCAINE HYDROCHLORIDE 20 MG/ML
JELLY TOPICAL
Status: DISPENSED
Start: 2024-07-09

## (undated) RX ORDER — OXYMETAZOLINE HYDROCHLORIDE 0.05 G/100ML
SPRAY NASAL
Status: DISPENSED
Start: 2024-12-06